# Patient Record
Sex: MALE | Race: WHITE | Employment: UNEMPLOYED | URBAN - NONMETROPOLITAN AREA
[De-identification: names, ages, dates, MRNs, and addresses within clinical notes are randomized per-mention and may not be internally consistent; named-entity substitution may affect disease eponyms.]

---

## 2018-12-13 ENCOUNTER — HOSPITAL ENCOUNTER (EMERGENCY)
Age: 48
Discharge: PSYCHIATRIC OR SUBSTANCE USE FACILITY | End: 2018-12-14
Attending: EMERGENCY MEDICINE
Payer: COMMERCIAL

## 2018-12-13 DIAGNOSIS — R45.851 SUICIDAL IDEATION: Primary | ICD-10-CM

## 2018-12-13 LAB
ABSOLUTE EOS #: 0 K/UL (ref 0–0.4)
ABSOLUTE IMMATURE GRANULOCYTE: ABNORMAL K/UL (ref 0–0.3)
ABSOLUTE LYMPH #: 0.8 K/UL (ref 1–4.8)
ABSOLUTE MONO #: 0.4 K/UL (ref 0.1–1.2)
ACETAMINOPHEN LEVEL: <5 UG/ML (ref 10–30)
ALBUMIN SERPL-MCNC: 4.4 G/DL (ref 3.5–5.2)
ALBUMIN/GLOBULIN RATIO: 1.6 (ref 1–2.5)
ALP BLD-CCNC: 58 U/L (ref 40–129)
ALT SERPL-CCNC: 10 U/L (ref 5–41)
ANION GAP SERPL CALCULATED.3IONS-SCNC: 19 MMOL/L (ref 9–17)
AST SERPL-CCNC: 16 U/L
BASOPHILS # BLD: 0 % (ref 0–2)
BASOPHILS ABSOLUTE: 0 K/UL (ref 0–0.2)
BILIRUB SERPL-MCNC: 0.27 MG/DL (ref 0.3–1.2)
BUN BLDV-MCNC: 9 MG/DL (ref 6–20)
BUN/CREAT BLD: 17 (ref 9–20)
CALCIUM SERPL-MCNC: 8.4 MG/DL (ref 8.6–10.4)
CHLORIDE BLD-SCNC: 105 MMOL/L (ref 98–107)
CO2: 20 MMOL/L (ref 20–31)
CREAT SERPL-MCNC: 0.54 MG/DL (ref 0.7–1.2)
DIFFERENTIAL TYPE: ABNORMAL
EOSINOPHILS RELATIVE PERCENT: 1 % (ref 1–8)
ETHANOL PERCENT: ABNORMAL %
ETHANOL: 247 MG/DL
GFR AFRICAN AMERICAN: >60 ML/MIN
GFR NON-AFRICAN AMERICAN: >60 ML/MIN
GFR SERPL CREATININE-BSD FRML MDRD: ABNORMAL ML/MIN/{1.73_M2}
GFR SERPL CREATININE-BSD FRML MDRD: ABNORMAL ML/MIN/{1.73_M2}
GLUCOSE BLD-MCNC: 62 MG/DL (ref 70–99)
HCT VFR BLD CALC: 41.3 % (ref 41–53)
HEMOGLOBIN: 13.6 G/DL (ref 13.5–17.5)
IMMATURE GRANULOCYTES: ABNORMAL %
LYMPHOCYTES # BLD: 16 % (ref 15–43)
MAGNESIUM: 2.1 MG/DL (ref 1.6–2.6)
MCH RBC QN AUTO: 32.8 PG (ref 26–34)
MCHC RBC AUTO-ENTMCNC: 33 G/DL (ref 31–37)
MCV RBC AUTO: 99.4 FL (ref 80–100)
MONOCYTES # BLD: 7 % (ref 6–14)
NRBC AUTOMATED: ABNORMAL PER 100 WBC
PDW BLD-RTO: 15.3 % (ref 11–14.5)
PLATELET # BLD: 228 K/UL (ref 140–450)
PLATELET ESTIMATE: ABNORMAL
PMV BLD AUTO: 7.7 FL (ref 6–12)
POTASSIUM SERPL-SCNC: 3.5 MMOL/L (ref 3.7–5.3)
RBC # BLD: 4.16 M/UL (ref 4.5–5.9)
RBC # BLD: ABNORMAL 10*6/UL
SALICYLATE LEVEL: <1 MG/DL (ref 3–10)
SEG NEUTROPHILS: 76 % (ref 44–74)
SEGMENTED NEUTROPHILS ABSOLUTE COUNT: 4.1 K/UL (ref 1.8–7.7)
SODIUM BLD-SCNC: 144 MMOL/L (ref 135–144)
THYROXINE, FREE: 1.61 NG/DL (ref 0.93–1.7)
TOTAL PROTEIN: 7.1 G/DL (ref 6.4–8.3)
TSH SERPL DL<=0.05 MIU/L-ACNC: 0.54 MIU/L (ref 0.3–5)
WBC # BLD: 5.3 K/UL (ref 3.5–11)
WBC # BLD: ABNORMAL 10*3/UL

## 2018-12-13 PROCEDURE — 6360000002 HC RX W HCPCS: Performed by: EMERGENCY MEDICINE

## 2018-12-13 PROCEDURE — 80053 COMPREHEN METABOLIC PANEL: CPT

## 2018-12-13 PROCEDURE — 80307 DRUG TEST PRSMV CHEM ANLYZR: CPT

## 2018-12-13 PROCEDURE — 2580000003 HC RX 258: Performed by: EMERGENCY MEDICINE

## 2018-12-13 PROCEDURE — G0480 DRUG TEST DEF 1-7 CLASSES: HCPCS

## 2018-12-13 PROCEDURE — 99285 EMERGENCY DEPT VISIT HI MDM: CPT

## 2018-12-13 PROCEDURE — 96374 THER/PROPH/DIAG INJ IV PUSH: CPT

## 2018-12-13 PROCEDURE — 84439 ASSAY OF FREE THYROXINE: CPT

## 2018-12-13 PROCEDURE — 85025 COMPLETE CBC W/AUTO DIFF WBC: CPT

## 2018-12-13 PROCEDURE — 83735 ASSAY OF MAGNESIUM: CPT

## 2018-12-13 PROCEDURE — 84443 ASSAY THYROID STIM HORMONE: CPT

## 2018-12-13 RX ORDER — 0.9 % SODIUM CHLORIDE 0.9 %
1000 INTRAVENOUS SOLUTION INTRAVENOUS ONCE
Status: COMPLETED | OUTPATIENT
Start: 2018-12-13 | End: 2018-12-13

## 2018-12-13 RX ORDER — LORAZEPAM 2 MG/ML
0.5 INJECTION INTRAMUSCULAR ONCE
Status: COMPLETED | OUTPATIENT
Start: 2018-12-13 | End: 2018-12-13

## 2018-12-13 RX ADMIN — SODIUM CHLORIDE 1000 ML: 9 INJECTION, SOLUTION INTRAVENOUS at 15:59

## 2018-12-13 RX ADMIN — SODIUM CHLORIDE 1000 ML: 9 INJECTION, SOLUTION INTRAVENOUS at 19:13

## 2018-12-13 RX ADMIN — LORAZEPAM 0.5 MG: 2 INJECTION INTRAMUSCULAR; INTRAVENOUS at 18:11

## 2018-12-13 NOTE — ED PROVIDER NOTES
by mouth every 8 hours as needed for Nausea    QUETIAPINE FUMARATE (SEROQUEL PO)    Take  by mouth. SUCRALFATE (CARAFATE) 1 GM TABLET    Take 1 tablet by mouth 4 times daily (with meals and nightly). ALLERGIES     is allergic to pcn [penicillins]. FAMILY HISTORY     has no family status information on file. family history is not on file. SOCIAL HISTORY      reports that he has never smoked. He has never used smokeless tobacco. He reports that he drinks about 10.0 oz of alcohol per week . He reports that he does not use drugs. PHYSICAL EXAM     INITIAL VITALS:  weight is 130 lb (59 kg). His tympanic temperature is 98.6 °F (37 °C). His blood pressure is 124/80 and his pulse is 97. His respiration is 16 and oxygen saturation is 97%. Physical Exam   Constitutional: He is oriented to person, place, and time. He appears well-developed and well-nourished. HENT:   Head: Normocephalic and atraumatic. Mouth/Throat: Oropharynx is clear and moist.   Eyes: Pupils are equal, round, and reactive to light. Conjunctivae and EOM are normal.   Neck: Normal range of motion. Neck supple. No tracheal deviation present. Cardiovascular: Regular rhythm and intact distal pulses. Tachycardia present. Pulmonary/Chest: Effort normal and breath sounds normal.   Abdominal: Soft. Bowel sounds are normal. He exhibits no distension. There is no tenderness. Musculoskeletal: Normal range of motion. He exhibits no edema or tenderness. Neurological: He is alert and oriented to person, place, and time. Skin: Skin is warm and dry. No rash noted. Psychiatric: He has a normal mood and affect. His behavior is normal. Judgment and thought content normal.   Vitals reviewed. DIFFERENTIAL DIAGNOSIS/ MDM:   Suicide precautions have been initiated. I will check baseline low blood work looking for organic issues. Alcohol level and other levels will be assessed.   Currently patient is not going through

## 2018-12-14 ENCOUNTER — HOSPITAL ENCOUNTER (INPATIENT)
Age: 48
LOS: 3 days | Discharge: HOME | DRG: 885 | End: 2018-12-17
Attending: PSYCHIATRY & NEUROLOGY | Admitting: PSYCHIATRY & NEUROLOGY
Payer: MEDICAID

## 2018-12-14 VITALS
BODY MASS INDEX: 19.2 KG/M2 | OXYGEN SATURATION: 98 % | TEMPERATURE: 99.2 F | HEART RATE: 96 BPM | RESPIRATION RATE: 16 BRPM | SYSTOLIC BLOOD PRESSURE: 134 MMHG | DIASTOLIC BLOOD PRESSURE: 88 MMHG | WEIGHT: 130 LBS

## 2018-12-14 PROBLEM — F33.9 RECURRENT DEPRESSION (HCC): Status: ACTIVE | Noted: 2018-12-14

## 2018-12-14 PROCEDURE — 6360000002 HC RX W HCPCS

## 2018-12-14 PROCEDURE — 1240000000 HC EMOTIONAL WELLNESS R&B

## 2018-12-14 PROCEDURE — 90792 PSYCH DIAG EVAL W/MED SRVCS: CPT | Performed by: PSYCHIATRY & NEUROLOGY

## 2018-12-14 PROCEDURE — 96376 TX/PRO/DX INJ SAME DRUG ADON: CPT

## 2018-12-14 PROCEDURE — 6370000000 HC RX 637 (ALT 250 FOR IP): Performed by: PSYCHIATRY & NEUROLOGY

## 2018-12-14 RX ORDER — TRAZODONE HYDROCHLORIDE 50 MG/1
50 TABLET ORAL NIGHTLY PRN
Status: DISCONTINUED | OUTPATIENT
Start: 2018-12-14 | End: 2018-12-17 | Stop reason: HOSPADM

## 2018-12-14 RX ORDER — LORAZEPAM 2 MG/ML
INJECTION INTRAMUSCULAR
Status: COMPLETED
Start: 2018-12-14 | End: 2018-12-14

## 2018-12-14 RX ORDER — CHLORDIAZEPOXIDE HYDROCHLORIDE 25 MG/1
25 CAPSULE, GELATIN COATED ORAL 4 TIMES DAILY PRN
Status: DISCONTINUED | OUTPATIENT
Start: 2018-12-14 | End: 2018-12-17 | Stop reason: HOSPADM

## 2018-12-14 RX ORDER — FAMOTIDINE 20 MG/1
40 TABLET, FILM COATED ORAL NIGHTLY
Status: DISCONTINUED | OUTPATIENT
Start: 2018-12-14 | End: 2018-12-17 | Stop reason: HOSPADM

## 2018-12-14 RX ORDER — BENZTROPINE MESYLATE 1 MG/ML
2 INJECTION INTRAMUSCULAR; INTRAVENOUS 2 TIMES DAILY PRN
Status: DISCONTINUED | OUTPATIENT
Start: 2018-12-14 | End: 2018-12-17 | Stop reason: HOSPADM

## 2018-12-14 RX ORDER — ONDANSETRON 4 MG/1
4 TABLET, ORALLY DISINTEGRATING ORAL EVERY 8 HOURS PRN
Status: DISCONTINUED | OUTPATIENT
Start: 2018-12-14 | End: 2018-12-17 | Stop reason: HOSPADM

## 2018-12-14 RX ORDER — OLANZAPINE 5 MG/1
5 TABLET ORAL
Status: ACTIVE | OUTPATIENT
Start: 2018-12-14 | End: 2018-12-14

## 2018-12-14 RX ORDER — LORAZEPAM 2 MG/ML
0.5 INJECTION INTRAMUSCULAR ONCE
Status: COMPLETED | OUTPATIENT
Start: 2018-12-14 | End: 2018-12-14

## 2018-12-14 RX ORDER — HYDROXYZINE 50 MG/1
50 TABLET, FILM COATED ORAL 3 TIMES DAILY PRN
Status: DISCONTINUED | OUTPATIENT
Start: 2018-12-14 | End: 2018-12-17 | Stop reason: HOSPADM

## 2018-12-14 RX ORDER — HALOPERIDOL 5 MG/ML
5 INJECTION INTRAMUSCULAR EVERY 4 HOURS PRN
Status: DISCONTINUED | OUTPATIENT
Start: 2018-12-14 | End: 2018-12-17 | Stop reason: HOSPADM

## 2018-12-14 RX ORDER — NICOTINE 21 MG/24HR
1 PATCH, TRANSDERMAL 24 HOURS TRANSDERMAL DAILY
Status: DISCONTINUED | OUTPATIENT
Start: 2018-12-14 | End: 2018-12-16

## 2018-12-14 RX ORDER — MAGNESIUM HYDROXIDE/ALUMINUM HYDROXICE/SIMETHICONE 120; 1200; 1200 MG/30ML; MG/30ML; MG/30ML
15 SUSPENSION ORAL EVERY 6 HOURS PRN
Status: DISCONTINUED | OUTPATIENT
Start: 2018-12-14 | End: 2018-12-17 | Stop reason: HOSPADM

## 2018-12-14 RX ORDER — VILAZODONE HYDROCHLORIDE 20 MG/1
20 TABLET ORAL DAILY
Status: DISCONTINUED | OUTPATIENT
Start: 2018-12-14 | End: 2018-12-17 | Stop reason: HOSPADM

## 2018-12-14 RX ORDER — ACETAMINOPHEN 325 MG/1
650 TABLET ORAL EVERY 4 HOURS PRN
Status: DISCONTINUED | OUTPATIENT
Start: 2018-12-14 | End: 2018-12-17 | Stop reason: HOSPADM

## 2018-12-14 RX ADMIN — LORAZEPAM 0.5 MG: 2 INJECTION INTRAMUSCULAR at 04:12

## 2018-12-14 RX ADMIN — TRAZODONE HYDROCHLORIDE 50 MG: 50 TABLET ORAL at 21:04

## 2018-12-14 RX ADMIN — ACETAMINOPHEN 650 MG: 325 TABLET, FILM COATED ORAL at 19:13

## 2018-12-14 RX ADMIN — VILAZODONE HYDROCHLORIDE 20 MG: 20 TABLET ORAL at 14:08

## 2018-12-14 RX ADMIN — FAMOTIDINE 40 MG: 20 TABLET ORAL at 21:04

## 2018-12-14 RX ADMIN — LORAZEPAM 0.5 MG: 2 INJECTION INTRAMUSCULAR; INTRAVENOUS at 04:12

## 2018-12-14 ASSESSMENT — LIFESTYLE VARIABLES
HISTORY_ALCOHOL_USE: YES
HISTORY_ALCOHOL_USE: YES

## 2018-12-14 ASSESSMENT — PAIN - FUNCTIONAL ASSESSMENT: PAIN_FUNCTIONAL_ASSESSMENT: 0-10

## 2018-12-14 ASSESSMENT — PAIN SCALES - GENERAL
PAINLEVEL_OUTOF10: 3
PAINLEVEL_OUTOF10: 7

## 2018-12-14 ASSESSMENT — SLEEP AND FATIGUE QUESTIONNAIRES: SLEEP PATTERN: UTA

## 2018-12-14 ASSESSMENT — PATIENT HEALTH QUESTIONNAIRE - PHQ9: SUM OF ALL RESPONSES TO PHQ QUESTIONS 1-9: 13

## 2018-12-14 NOTE — BH NOTE
`[facility]  Admission Note     Admission Type:   Admission Type: Voluntary    Reason for admission:  Reason for Admission: Patient and his significant other recently broke up. Patient also recently lost his job. Currently depressed, anxious, and having suicidal ideation to jump off a bridge.      PATIENT STRENGTHS:  Strengths:  (Unknown)    Patient Strengths and Limitations:  Limitations: Inappropriate/potentially harmful leisure interests, Hopeless about future    Addictive Behavior:   Addictive Behavior  In the past 3 months, have you felt or has someone told you that you have a problem with:  : None  Do you have a history of Chemical Use?: No  Do you have a history of Alcohol Use?: Yes  Do you have a history of Street Drug Abuse?: No  Histroy of Prescripton Drug Abuse?: No    Medical Problems:   Past Medical History:   Diagnosis Date    Alcohol abuse     Alcohol abuse 6/5/2014    GI bleed 6/5/2014    Hematemesis 6/5/2014    Hypertension     Intractable vomiting 6/5/2014    Suicidal behavior 12/13/2018       Status EXAM:  Status and Exam  Normal: No  Facial Expression: Avoids Gaze, Expressionless, Flat, Sad  Affect: Blunt  Level of Consciousness: Alert  Mood:Normal: No  Mood: Depressed, Anxious, Worthless, low self-esteem, Sad, Empty  Motor Activity:Normal: No  Motor Activity: Decreased  Interview Behavior: Cooperative, Evasive  Preception: [place] to Person, Judythe Mate to Time, [place] to Place, [place] to Situation  Attention:Normal: No  Attention: Unable to Concentrate  Thought Processes: Blocking  Thought Content:Normal: No  Thought Content: Poverty of Content  Hallucinations: None  Delusions: No  Memory:Normal: Yes  Insight and Judgment: No  Insight and Judgment: Poor Insight, Poor Judgment  Present Suicidal Ideation: Yes (SI to jump off of a bridge)  Present Homicidal Ideation: No    Tobacco Screening:  Practical Counseling, on admission, doyle X, if applicable and completed (first 3 are
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
## 2018-12-15 PROBLEM — F32.2 SEVERE MAJOR DEPRESSION WITHOUT PSYCHOTIC FEATURES (HCC): Status: ACTIVE | Noted: 2018-12-15

## 2018-12-15 PROCEDURE — 6370000000 HC RX 637 (ALT 250 FOR IP): Performed by: NURSE PRACTITIONER

## 2018-12-15 PROCEDURE — 6370000000 HC RX 637 (ALT 250 FOR IP): Performed by: PSYCHIATRY & NEUROLOGY

## 2018-12-15 PROCEDURE — 99232 SBSQ HOSP IP/OBS MODERATE 35: CPT | Performed by: REGISTERED NURSE

## 2018-12-15 PROCEDURE — 1240000000 HC EMOTIONAL WELLNESS R&B

## 2018-12-15 RX ADMIN — CHLORDIAZEPOXIDE HYDROCHLORIDE 25 MG: 25 CAPSULE ORAL at 10:35

## 2018-12-15 RX ADMIN — VILAZODONE HYDROCHLORIDE 20 MG: 20 TABLET ORAL at 08:39

## 2018-12-15 RX ADMIN — FAMOTIDINE 40 MG: 20 TABLET ORAL at 20:54

## 2018-12-15 RX ADMIN — HYDROXYZINE HYDROCHLORIDE 50 MG: 50 TABLET, FILM COATED ORAL at 13:29

## 2018-12-15 RX ADMIN — CHLORDIAZEPOXIDE HYDROCHLORIDE 25 MG: 25 CAPSULE ORAL at 17:37

## 2018-12-15 RX ADMIN — TRAZODONE HYDROCHLORIDE 50 MG: 50 TABLET ORAL at 20:54

## 2018-12-15 NOTE — BH NOTE
Pt did not participate in Cognitive Skills Group at 1330 due to pt was resting in room et declined to attend group when encouraged.

## 2018-12-15 NOTE — BH NOTE
Patient did not participate in PM wrap-up group,  After invitation given. Patient remain seclusive to self. Every 15 minute checks maintained.

## 2018-12-15 NOTE — PROGRESS NOTES
Oral Nightly Marycruz Koenig MD   40 mg at 12/14/18 2104    ondansetron (ZOFRAN-ODT) disintegrating tablet 4 mg  4 mg Oral Q8H PRN Marycruz Koenig MD        acetaminophen (TYLENOL) tablet 650 mg  650 mg Oral Q4H PRN Marycruz Koenig MD   650 mg at 12/14/18 1913    hydrOXYzine (ATARAX) tablet 50 mg  50 mg Oral TID PRN Marycruz Koenig MD        haloperidol lactate (HALDOL) injection 5 mg  5 mg Intramuscular Q4H PRN Marycruz Koenig MD        benztropine mesylate (COGENTIN) injection 2 mg  2 mg Intramuscular BID PRN Marycruz Koenig MD        magnesium hydroxide (MILK OF MAGNESIA) 400 MG/5ML suspension 30 mL  30 mL Oral Daily PRN Marycruz Koenig MD        aluminum & magnesium hydroxide-simethicone (MAALOX) 200-200-20 MG/5ML suspension 15 mL  15 mL Oral Q6H PRN Marycruz Koenig MD        nicotine (NICODERM CQ) 21 MG/24HR 1 patch  1 patch Transdermal Daily Marycruz Koenig MD        vilazodone HCl (VIIBRYD) TABS 20 mg  20 mg Oral Daily Marycruz Koenig MD   20 mg at 12/15/18 2162         famotidine  40 mg Oral Nightly    nicotine  1 patch Transdermal Daily    vilazodone HCl  20 mg Oral Daily       ASSESSMENT  Severe major depression without psychotic features ([facility] [address]     Patient's Response to Treatment: positive    PLAN    · Continue Viibryd 20 mg daily and Librium for alcohol withdrawal.  · Groups and therapeutic 1:1.  · Discharge when psychiatrically stable.       Electronically signed by EMANUEL Woodward on 12/15/2018 at 1:03 PM.

## 2018-12-16 PROCEDURE — 6370000000 HC RX 637 (ALT 250 FOR IP): Performed by: PSYCHIATRY & NEUROLOGY

## 2018-12-16 PROCEDURE — 6370000000 HC RX 637 (ALT 250 FOR IP): Performed by: NURSE PRACTITIONER

## 2018-12-16 PROCEDURE — 1240000000 HC EMOTIONAL WELLNESS R&B

## 2018-12-16 RX ADMIN — CHLORDIAZEPOXIDE HYDROCHLORIDE 25 MG: 25 CAPSULE ORAL at 16:31

## 2018-12-16 RX ADMIN — HYDROXYZINE HYDROCHLORIDE 50 MG: 50 TABLET, FILM COATED ORAL at 12:48

## 2018-12-16 RX ADMIN — TRAZODONE HYDROCHLORIDE 50 MG: 50 TABLET ORAL at 20:37

## 2018-12-16 RX ADMIN — FAMOTIDINE 40 MG: 20 TABLET ORAL at 20:37

## 2018-12-16 RX ADMIN — VILAZODONE HYDROCHLORIDE 20 MG: 20 TABLET ORAL at 09:58

## 2018-12-16 RX ADMIN — CHLORDIAZEPOXIDE HYDROCHLORIDE 25 MG: 25 CAPSULE ORAL at 09:58

## 2018-12-17 VITALS
HEART RATE: 73 BPM | DIASTOLIC BLOOD PRESSURE: 55 MMHG | TEMPERATURE: 97.9 F | HEIGHT: 69 IN | SYSTOLIC BLOOD PRESSURE: 97 MMHG | WEIGHT: 130 LBS | RESPIRATION RATE: 12 BRPM | BODY MASS INDEX: 19.26 KG/M2

## 2018-12-17 PROCEDURE — 6370000000 HC RX 637 (ALT 250 FOR IP): Performed by: PSYCHIATRY & NEUROLOGY

## 2018-12-17 PROCEDURE — 5130000000 HC BRIDGE APPOINTMENT

## 2018-12-17 PROCEDURE — 99239 HOSP IP/OBS DSCHRG MGMT >30: CPT | Performed by: REGISTERED NURSE

## 2018-12-17 RX ORDER — VILAZODONE HYDROCHLORIDE 20 MG/1
20 TABLET ORAL DAILY
Qty: 14 TABLET | Refills: 0 | Status: SHIPPED | OUTPATIENT
Start: 2018-12-18 | End: 2019-02-19

## 2018-12-17 RX ADMIN — VILAZODONE HYDROCHLORIDE 20 MG: 20 TABLET ORAL at 10:56

## 2018-12-17 RX ADMIN — HYDROXYZINE HYDROCHLORIDE 50 MG: 50 TABLET, FILM COATED ORAL at 10:56

## 2018-12-17 NOTE — BH NOTE
Writer notified per Charge Nurse \"Vandana\", ok to utilize [facility] and [facility] per \"Candice\", due to patient not having Insurance.

## 2018-12-17 NOTE — DISCHARGE INSTR - COC
Status:  {IP PT MENTAL STATUS:}    IV Access:  { MARGARITA IV ACCESS:871077531}    Nursing Mobility/ADLs:  Walking   {CHP DME FABW:398079643}  Transfer  {CHP DME DNDY:585410735}  Bathing  {CHP DME FHCA:074798129}  Dressing  {CHP DME MTHP:587324853}  Toileting  {CHP DME KRRE:239896147}  Feeding  {P DME ONEJ:860504559}  Med Admin  {P DME WHKA:079587744}  Med Delivery   { MARGARITA MED Delivery:917789794}    Wound Care Documentation and Therapy:        Elimination:  Continence:   · Bowel: {YES / G}  · Bladder: {YES / PI:20818}  Urinary Catheter: {Urinary Catheter:638610457}   Colostomy/Ileostomy/Ileal Conduit: {YES / JW:55374}       Date of Last BM: ***  No intake or output data in the 24 hours ending 18 1457  No intake/output data recorded.     Safety Concerns:     [employer] Safety Concerns:244393747}    Impairments/Disabilities:      [employer] Impairments/Disabilities:247842743}    Nutrition Therapy:  Current Nutrition Therapy:   [employer] Diet List:255661948}    Routes of Feeding: {[facility] DME Other Feedings:992333083}  Liquids: {Slp liquid thickness:98525}  Daily Fluid Restriction: {CHP DME Yes amt example:430204739}  Last Modified Barium Swallow with Video (Video Swallowing Test): {Done Not Done TOWJ:077191310}    Treatments at the Time of Hospital Discharge:   Respiratory Treatments: ***  Oxygen Therapy:  {Therapy; copd oxygen:62236}  Ventilator:    {[facility] Vent OQFR:021313200}    Rehab Therapies: {THERAPEUTIC INTERVENTION:8698355790}  Weight Bearing Status/Restrictions: [employer] Weight Bearin}  Other Medical Equipment (for information only, NOT a DME order):  {EQUIPMENT:688429722}  Other Treatments: ***    Patient's personal belongings (please select all that are sent with patient):  {[facility] DME Belongings:166599192}    RN SIGNATURE:  {Esignature:739694085}    CASE MANAGEMENT/SOCIAL WORK SECTION    Inpatient Status Date: ***    Readmission Risk Assessment Score:  Readmission Risk              Risk of Unplanned Readmission:        8           Discharging to Facility/ Agency   · Name:   · Address:  · Phone:  · Fax:    Dialysis Facility (if applicable)   · Name:  · Address:  · Dialysis Schedule:  · Phone:  · Fax:    / signature: {Esignature:214040695}    PHYSICIAN SECTION    Prognosis: {Prognosis:8481813224}    Condition at Discharge: [place] Patient Condition:443268549}    Rehab Potential (if transferring to Rehab): {Prognosis:2444627053}    Recommended Labs or Other Treatments After Discharge: ***    Physician Certification: I certify the above information and transfer of Radha Sandoval  is necessary for the continuing treatment of the diagnosis listed and that he requires {Admit to Appropriate Level of Care:12732} for {GREATER/LESS:672978113} 30 days.      Update Admission H&P: {CHP DME Changes in XOUFZ:543433319}    PHYSICIAN SIGNATURE:  {Esignature:850771486}

## 2018-12-17 NOTE — CARE COORDINATION
Pt declined to attend psychoeducation at 1100 am despite encouragement. Pt offered 1:1 and accepted/refused.
family. Pt states that he has no Insurance. Pt states that he has completed his GED. Pt states that he is currently having SI. Pt denies AH, VH, and HI.  Pt states that he has not attempted suicide in the past.

## 2018-12-17 NOTE — DISCHARGE SUMMARY
DISCHARGE SUMMARY  Patient ID:  Helen Kirk  [id number]  2 y.o.  1970    Admit date: 12/14/2018    Discharge date and time: 12/17/2018  2:18 PM     Admitting Physician: Vikash Rogel MD     Discharge Physician:  Melissa Joyner APRN - CNS    Admission Diagnoses: Recurrent depression [facility]) [F33.9]    Discharge Diagnoses:   Severe major depression without psychotic features [facility])     Patient Active Problem List   Diagnosis Code    GI bleed K92.2    Alcohol abuse F10.10    Gastritis K29.70    Esophagitis K20.9    Hypokalemia E87.6    Intractable nausea and vomiting R11.2    Anxiety F41.9    Hypertension I10    Hematemesis K92.0    Recurrent depression ([facility] [address] F33.9    Severe major depression without psychotic features ([facility] [address] F32.2        Admission Condition: poor    Discharged Condition: stable    Indication for Admission: threat to self    History of Present Illnes (present tense wording indicates findings from admission exam on 12/14/2018 and are not necessarily indicative of current findings): Hospital Course:   Upon admission, Helen Kirk was provided a safe secure environment, introduced to unit milieu. Patient did not participate in groups or individual therapies. Meds were adjusted. After few days of hospital care, patient began to feel improvement. On morning rounds 12/17/2018, patient endorsed feeling ready for discharge. He reports he never felt suicidal and that he reported that because he was homeless and needed a place to stay. He states he would like to be discharged to a shelter. Patient denies hallucinations or delusions. Denies SE's from meds. It was decided that pt had achieved maximum benefit from hospital care and can be discharged     Consults:   None    Significant Diagnostic Studies: Routine labs and diagnostics    Treatments: Psychotropic medications, therapy with group, milieu, and 1:1 with nurses, social workers, PA-C/CNP, and Attending physician.       Discharge

## 2018-12-17 NOTE — PLAN OF CARE
Problem: Altered Mood, Depressive Behavior:  Goal: Able to verbalize and/or display a decrease in depressive symptoms  Able to verbalize and/or display a decrease in depressive symptoms   Outcome: Ongoing  Patient observed with flat affect. Irritable upon approach. Patient is med and somatic focused. Reassurance given during 1:1 interaction. Patient has been isolative and withdrawn, but does come out for needs. Encouraged to set a goal to attend programming, but patient declines at this time and continues to isolate. Patient denies suicidal and homicidal ideation. Denies audio and visual hallucinations. Observed depressed and unmotivated. Educated on positive coping skills and relaxation techniques. Discussed healthy lifestyle choices and the importance of compliance upon discharge. Educated patient on ways to maintain sobriety. Encouraged to surround self around positive sober persons and attend 90 meetings in 90 days upon discharge. Patient has remained free from harm. Every 15 minute and spontaneous safety checks have been maintained.   Goal: Ability to disclose and discuss suicidal ideas will improve  Ability to disclose and discuss suicidal ideas will improve   Outcome: Ongoing    Goal: Able to verbalize support systems  Able to verbalize support systems   Outcome: Ongoing    Goal: Absence of self-harm  Absence of self-harm   Outcome: Ongoing
GOALS UPDATE:   Time frame for Long-Term Goals: 6 months  Members Present in Team Meeting: See Signature Sheet    ROSA Alaniz  Goal: Ability to disclose and discuss suicidal ideas will improve  Ability to disclose and discuss suicidal ideas will improve   Outcome: Ongoing    Goal: Able to verbalize support systems  Able to verbalize support systems   Outcome: Ongoing    Goal: Absence of self-harm  Absence of self-harm   Outcome: Ongoing

## 2018-12-28 NOTE — ED PROVIDER NOTES
ATTENDING  ADDENDUM     Care of this patient was assumed from preceding physician. The patient was seen for Suicidal  .  The patient's initial evaluation and plan have been discussed with the prior provider who initially evaluated the patient. Nursing Notes, Past Medical Hx, Past Surgical Hx, Social Hx, Allergies, and Family Hx were all reviewed. This patient was initally seen by Alaina ag and was awaitng transport for suicidal ideation and admission. I did not order anything new on this patient. DIFFERENTIAL DIAGNOSIS/ MDM:           Follow Exit Care instructions closely. I have reviewed the disposition diagnosis with the patient and or their family/guardian. I have answered their questions and given discharge instructions. They voiced understanding of these instructions and did not have any further questions or complaints.     DIAGNOSTIC RESULTS     RADIOLOGY:   Non-plain film images such as CT, Ultrasound and MRI are read by the radiologist. Plain radiographic images are visualized and preliminarily interpreted by the emergency physician with the below findings:  No orders to display       LABS:  Results for orders placed or performed during the hospital encounter of 12/13/18   CBC Auto Differential   Result Value Ref Range    WBC 5.3 3.5 - 11.0 k/uL    RBC 4.16 (L) 4.5 - 5.9 m/uL    Hemoglobin 13.6 13.5 - 17.5 g/dL    Hematocrit 41.3 41 - 53 %    MCV 99.4 80 - 100 fL    MCH 32.8 26 - 34 pg    MCHC 33.0 31 - 37 g/dL    RDW 15.3 (H) 11.0 - 14.5 %    Platelets 197 283 - 503 k/uL    MPV 7.7 6.0 - 12.0 fL    NRBC Automated NOT REPORTED per 100 WBC    Differential Type NOT REPORTED     Immature Granulocytes NOT REPORTED 0 %    Absolute Immature Granulocyte NOT REPORTED 0.00 - 0.30 k/uL    WBC Morphology NOT REPORTED     RBC Morphology NOT REPORTED     Platelet Estimate NOT REPORTED     Seg Neutrophils 76 (H) 44 - 74 %    Lymphocytes 16 15 - 43 %    Monocytes 7 6 - 14 %    Eosinophils % 1 1 - 8 %    Basophils CREATININE 0.54 (*)     Calcium 8.4 (*)     Potassium 3.5 (*)     Anion Gap 19 (*)     Total Bilirubin 0.27 (*)     All other components within normal limits   SALICYLATE LEVEL - Abnormal; Notable for the following:     Salicylate Lvl <1 (*)     All other components within normal limits   ACETAMINOPHEN LEVEL - Abnormal; Notable for the following:     Acetaminophen Level <5 (*)     All other components within normal limits   TSH WITHOUT REFLEX   T4, FREE   MAGNESIUM   URINALYSIS   URINE DRUG SCREEN        EKG:      EMERGENCY DEPARTMENT COURSE:   Vitals:    Vitals:    12/14/18 0317 12/14/18 0330 12/14/18 0345 12/14/18 0400   BP:    134/88   Pulse:    96   Resp: 16 16 16 16   Temp:    99.2 °F (37.3 °C)   TempSrc:    Tympanic   SpO2:    98%   Weight:         -------------------------  BP: 134/88, Temp: 99.2 °F (37.3 °C), Pulse: 96, Resp: 16          FINAL IMPRESSION      1. Suicidal ideation          DISPOSITION/PLAN   DISPOSITION Decision To Transfer 12/14/2018 04:58:55 AM      Condition on Disposition    stable    PATIENT REFERRED TO:  No follow-up provider specified.     DISCHARGE MEDICATIONS:  Discharge Medication List as of 12/14/2018  4:59 AM          (Please note that portions of this note were completed with a voice recognition program.  Efforts were made to edit the dictations but occasionally words are mis-transcribed.)    Martinez MD  Attending Emergency Physician        Casimiro Mclean MD  12/27/18 6225 [place] III, MD  12/27/18 0528

## 2019-02-18 ENCOUNTER — HOSPITAL ENCOUNTER (INPATIENT)
Age: 49
LOS: 2 days | Discharge: HOME | End: 2019-02-21
Attending: EMERGENCY MEDICINE | Admitting: INTERNAL MEDICINE
Payer: MEDICAID

## 2019-02-18 DIAGNOSIS — F10.10 ALCOHOL ABUSE: Chronic | ICD-10-CM

## 2019-02-18 DIAGNOSIS — F10.930 ALCOHOL WITHDRAWAL SYNDROME WITHOUT COMPLICATION (HCC): Primary | ICD-10-CM

## 2019-02-18 DIAGNOSIS — Z59.00 HOMELESS: ICD-10-CM

## 2019-02-18 PROCEDURE — 99285 EMERGENCY DEPT VISIT HI MDM: CPT

## 2019-02-18 SDOH — ECONOMIC STABILITY - HOUSING INSECURITY: HOMELESSNESS UNSPECIFIED: Z59.00

## 2019-02-19 ENCOUNTER — APPOINTMENT (OUTPATIENT)
Dept: GENERAL RADIOLOGY | Age: 49
End: 2019-02-19
Payer: MEDICAID

## 2019-02-19 PROBLEM — F10.930 ALCOHOL WITHDRAWAL, UNCOMPLICATED (HCC): Status: ACTIVE | Noted: 2019-02-19

## 2019-02-19 PROBLEM — Z87.19 HISTORY OF ESOPHAGITIS: Status: ACTIVE | Noted: 2019-02-19

## 2019-02-19 LAB
ABSOLUTE EOS #: 0.16 K/UL (ref 0–0.44)
ABSOLUTE IMMATURE GRANULOCYTE: <0.03 K/UL (ref 0–0.3)
ABSOLUTE LYMPH #: 1.09 K/UL (ref 1.1–3.7)
ABSOLUTE MONO #: 0.72 K/UL (ref 0.1–1.2)
ACETAMINOPHEN LEVEL: <5 UG/ML (ref 10–30)
ALBUMIN SERPL-MCNC: 3.8 G/DL (ref 3.5–5.2)
ALBUMIN/GLOBULIN RATIO: 1.4 (ref 1–2.5)
ALP BLD-CCNC: 65 U/L (ref 40–129)
ALT SERPL-CCNC: 11 U/L (ref 5–41)
AMPHETAMINE SCREEN URINE: NEGATIVE
ANION GAP SERPL CALCULATED.3IONS-SCNC: 17 MMOL/L (ref 9–17)
AST SERPL-CCNC: 14 U/L
BARBITURATE SCREEN URINE: NEGATIVE
BASOPHILS # BLD: 1 % (ref 0–2)
BASOPHILS ABSOLUTE: 0.04 K/UL (ref 0–0.2)
BENZODIAZEPINE SCREEN, URINE: NEGATIVE
BILIRUB SERPL-MCNC: 0.52 MG/DL (ref 0.3–1.2)
BILIRUBIN DIRECT: 0.13 MG/DL
BILIRUBIN, INDIRECT: 0.39 MG/DL (ref 0–1)
BUN BLDV-MCNC: 16 MG/DL (ref 6–20)
BUN/CREAT BLD: ABNORMAL (ref 9–20)
BUPRENORPHINE URINE: NORMAL
CALCIUM SERPL-MCNC: 8.4 MG/DL (ref 8.6–10.4)
CANNABINOID SCREEN URINE: NEGATIVE
CHLORIDE BLD-SCNC: 105 MMOL/L (ref 98–107)
CO2: 22 MMOL/L (ref 20–31)
COCAINE METABOLITE, URINE: NEGATIVE
CREAT SERPL-MCNC: 0.55 MG/DL (ref 0.7–1.2)
DIFFERENTIAL TYPE: ABNORMAL
EOSINOPHILS RELATIVE PERCENT: 2 % (ref 1–4)
ETHANOL PERCENT: 0.14 %
ETHANOL PERCENT: <0.01 %
ETHANOL: 141 MG/DL
ETHANOL: <10 MG/DL
FOLATE: 7.6 NG/ML
GFR AFRICAN AMERICAN: >60 ML/MIN
GFR NON-AFRICAN AMERICAN: >60 ML/MIN
GFR SERPL CREATININE-BSD FRML MDRD: ABNORMAL ML/MIN/{1.73_M2}
GFR SERPL CREATININE-BSD FRML MDRD: ABNORMAL ML/MIN/{1.73_M2}
GLOBULIN: NORMAL G/DL (ref 1.5–3.8)
GLUCOSE BLD-MCNC: 95 MG/DL (ref 70–99)
HCT VFR BLD CALC: 35.7 % (ref 40.7–50.3)
HEMOGLOBIN: 12.3 G/DL (ref 13–17)
HIV AG/AB: NONREACTIVE
IMMATURE GRANULOCYTES: 0 %
LACTIC ACID, WHOLE BLOOD: 0.8 MMOL/L (ref 0.7–2.1)
LIPASE: 20 U/L (ref 13–60)
LYMPHOCYTES # BLD: 13 % (ref 24–43)
MCH RBC QN AUTO: 33.7 PG (ref 25.2–33.5)
MCHC RBC AUTO-ENTMCNC: 34.5 G/DL (ref 28.4–34.8)
MCV RBC AUTO: 97.8 FL (ref 82.6–102.9)
MDMA URINE: NORMAL
METHADONE SCREEN, URINE: NEGATIVE
METHAMPHETAMINE, URINE: NORMAL
MONOCYTES # BLD: 9 % (ref 3–12)
NRBC AUTOMATED: 0 PER 100 WBC
OPIATES, URINE: NEGATIVE
OXYCODONE SCREEN URINE: NEGATIVE
PDW BLD-RTO: 12.6 % (ref 11.8–14.4)
PHENCYCLIDINE, URINE: NEGATIVE
PLATELET # BLD: 210 K/UL (ref 138–453)
PLATELET ESTIMATE: ABNORMAL
PMV BLD AUTO: 10.3 FL (ref 8.1–13.5)
POTASSIUM SERPL-SCNC: 3.4 MMOL/L (ref 3.7–5.3)
PROPOXYPHENE, URINE: NORMAL
RBC # BLD: 3.65 M/UL (ref 4.21–5.77)
RBC # BLD: ABNORMAL 10*6/UL
SALICYLATE LEVEL: <1 MG/DL (ref 3–10)
SEG NEUTROPHILS: 75 % (ref 36–65)
SEGMENTED NEUTROPHILS ABSOLUTE COUNT: 6.46 K/UL (ref 1.5–8.1)
SODIUM BLD-SCNC: 144 MMOL/L (ref 135–144)
TEST INFORMATION: NORMAL
TOTAL PROTEIN: 6.5 G/DL (ref 6.4–8.3)
TOXIC TRICYCLIC SC,BLOOD: NEGATIVE
TRICYCLIC ANTIDEPRESSANTS, UR: NORMAL
VITAMIN B-12: 261 PG/ML (ref 232–1245)
WBC # BLD: 8.5 K/UL (ref 3.5–11.3)
WBC # BLD: ABNORMAL 10*3/UL

## 2019-02-19 PROCEDURE — 81003 URINALYSIS AUTO W/O SCOPE: CPT

## 2019-02-19 PROCEDURE — G0378 HOSPITAL OBSERVATION PER HR: HCPCS

## 2019-02-19 PROCEDURE — 96375 TX/PRO/DX INJ NEW DRUG ADDON: CPT

## 2019-02-19 PROCEDURE — 96372 THER/PROPH/DIAG INJ SC/IM: CPT

## 2019-02-19 PROCEDURE — 85025 COMPLETE CBC W/AUTO DIFF WBC: CPT

## 2019-02-19 PROCEDURE — 87040 BLOOD CULTURE FOR BACTERIA: CPT

## 2019-02-19 PROCEDURE — 6370000000 HC RX 637 (ALT 250 FOR IP): Performed by: EMERGENCY MEDICINE

## 2019-02-19 PROCEDURE — 96365 THER/PROPH/DIAG IV INF INIT: CPT

## 2019-02-19 PROCEDURE — 2580000003 HC RX 258: Performed by: EMERGENCY MEDICINE

## 2019-02-19 PROCEDURE — 6360000002 HC RX W HCPCS: Performed by: EMERGENCY MEDICINE

## 2019-02-19 PROCEDURE — 2580000003 HC RX 258: Performed by: STUDENT IN AN ORGANIZED HEALTH CARE EDUCATION/TRAINING PROGRAM

## 2019-02-19 PROCEDURE — 6370000000 HC RX 637 (ALT 250 FOR IP): Performed by: STUDENT IN AN ORGANIZED HEALTH CARE EDUCATION/TRAINING PROGRAM

## 2019-02-19 PROCEDURE — 90792 PSYCH DIAG EVAL W/MED SRVCS: CPT | Performed by: NURSE PRACTITIONER

## 2019-02-19 PROCEDURE — 82746 ASSAY OF FOLIC ACID SERUM: CPT

## 2019-02-19 PROCEDURE — 99222 1ST HOSP IP/OBS MODERATE 55: CPT | Performed by: INTERNAL MEDICINE

## 2019-02-19 PROCEDURE — 71046 X-RAY EXAM CHEST 2 VIEWS: CPT

## 2019-02-19 PROCEDURE — 6360000002 HC RX W HCPCS: Performed by: STUDENT IN AN ORGANIZED HEALTH CARE EDUCATION/TRAINING PROGRAM

## 2019-02-19 PROCEDURE — 2060000000 HC ICU INTERMEDIATE R&B

## 2019-02-19 PROCEDURE — 87389 HIV-1 AG W/HIV-1&-2 AB AG IA: CPT

## 2019-02-19 PROCEDURE — 96367 TX/PROPH/DG ADDL SEQ IV INF: CPT

## 2019-02-19 PROCEDURE — 2500000003 HC RX 250 WO HCPCS: Performed by: EMERGENCY MEDICINE

## 2019-02-19 PROCEDURE — 83690 ASSAY OF LIPASE: CPT

## 2019-02-19 PROCEDURE — 80076 HEPATIC FUNCTION PANEL: CPT

## 2019-02-19 PROCEDURE — 80307 DRUG TEST PRSMV CHEM ANLYZR: CPT

## 2019-02-19 PROCEDURE — 82607 VITAMIN B-12: CPT

## 2019-02-19 PROCEDURE — G0480 DRUG TEST DEF 1-7 CLASSES: HCPCS

## 2019-02-19 PROCEDURE — 83605 ASSAY OF LACTIC ACID: CPT

## 2019-02-19 PROCEDURE — 36415 COLL VENOUS BLD VENIPUNCTURE: CPT

## 2019-02-19 PROCEDURE — 80048 BASIC METABOLIC PNL TOTAL CA: CPT

## 2019-02-19 RX ORDER — SODIUM CHLORIDE 0.9 % (FLUSH) 0.9 %
10 SYRINGE (ML) INJECTION PRN
Status: DISCONTINUED | OUTPATIENT
Start: 2019-02-19 | End: 2019-02-19

## 2019-02-19 RX ORDER — FAMOTIDINE 20 MG/1
20 TABLET, FILM COATED ORAL 2 TIMES DAILY
Status: CANCELLED | OUTPATIENT
Start: 2019-02-19

## 2019-02-19 RX ORDER — SODIUM CHLORIDE 0.9 % (FLUSH) 0.9 %
10 SYRINGE (ML) INJECTION EVERY 12 HOURS SCHEDULED
Status: DISCONTINUED | OUTPATIENT
Start: 2019-02-19 | End: 2019-02-21 | Stop reason: HOSPADM

## 2019-02-19 RX ORDER — ONDANSETRON 2 MG/ML
4 INJECTION INTRAMUSCULAR; INTRAVENOUS EVERY 6 HOURS PRN
Status: DISCONTINUED | OUTPATIENT
Start: 2019-02-19 | End: 2019-02-21 | Stop reason: HOSPADM

## 2019-02-19 RX ORDER — LORAZEPAM 2 MG/ML
1 INJECTION INTRAMUSCULAR
Status: DISCONTINUED | OUTPATIENT
Start: 2019-02-19 | End: 2019-02-21 | Stop reason: HOSPADM

## 2019-02-19 RX ORDER — ACETAMINOPHEN 325 MG/1
650 TABLET ORAL EVERY 4 HOURS PRN
Status: DISCONTINUED | OUTPATIENT
Start: 2019-02-19 | End: 2019-02-19

## 2019-02-19 RX ORDER — LORAZEPAM 2 MG/1
2 TABLET ORAL
Status: DISCONTINUED | OUTPATIENT
Start: 2019-02-19 | End: 2019-02-21 | Stop reason: HOSPADM

## 2019-02-19 RX ORDER — POTASSIUM CHLORIDE 20 MEQ/1
40 TABLET, EXTENDED RELEASE ORAL ONCE
Status: DISCONTINUED | OUTPATIENT
Start: 2019-02-19 | End: 2019-02-21 | Stop reason: HOSPADM

## 2019-02-19 RX ORDER — CHLORDIAZEPOXIDE HYDROCHLORIDE 10 MG/1
10 CAPSULE, GELATIN COATED ORAL ONCE
Status: COMPLETED | OUTPATIENT
Start: 2019-02-19 | End: 2019-02-19

## 2019-02-19 RX ORDER — SODIUM CHLORIDE 0.9 % (FLUSH) 0.9 %
10 SYRINGE (ML) INJECTION EVERY 12 HOURS SCHEDULED
Status: DISCONTINUED | OUTPATIENT
Start: 2019-02-19 | End: 2019-02-19

## 2019-02-19 RX ORDER — LORAZEPAM 2 MG/1
4 TABLET ORAL
Status: DISCONTINUED | OUTPATIENT
Start: 2019-02-19 | End: 2019-02-21 | Stop reason: HOSPADM

## 2019-02-19 RX ORDER — FOLIC ACID 1 MG/1
1 TABLET ORAL DAILY
Status: DISCONTINUED | OUTPATIENT
Start: 2019-02-19 | End: 2019-02-21 | Stop reason: HOSPADM

## 2019-02-19 RX ORDER — LORAZEPAM 2 MG/ML
2 INJECTION INTRAMUSCULAR
Status: DISCONTINUED | OUTPATIENT
Start: 2019-02-19 | End: 2019-02-21 | Stop reason: HOSPADM

## 2019-02-19 RX ORDER — MULTIVITAMIN WITH FOLIC ACID 400 MCG
1 TABLET ORAL DAILY
Status: DISCONTINUED | OUTPATIENT
Start: 2019-02-19 | End: 2019-02-21 | Stop reason: HOSPADM

## 2019-02-19 RX ORDER — SODIUM CHLORIDE 9 MG/ML
INJECTION, SOLUTION INTRAVENOUS CONTINUOUS
Status: DISCONTINUED | OUTPATIENT
Start: 2019-02-19 | End: 2019-02-21 | Stop reason: HOSPADM

## 2019-02-19 RX ORDER — LORAZEPAM 2 MG/ML
4 INJECTION INTRAMUSCULAR
Status: DISCONTINUED | OUTPATIENT
Start: 2019-02-19 | End: 2019-02-21 | Stop reason: HOSPADM

## 2019-02-19 RX ORDER — LORAZEPAM 2 MG/ML
3 INJECTION INTRAMUSCULAR
Status: DISCONTINUED | OUTPATIENT
Start: 2019-02-19 | End: 2019-02-21 | Stop reason: HOSPADM

## 2019-02-19 RX ORDER — THIAMINE MONONITRATE (VIT B1) 100 MG
100 TABLET ORAL DAILY
Status: DISCONTINUED | OUTPATIENT
Start: 2019-02-19 | End: 2019-02-21 | Stop reason: HOSPADM

## 2019-02-19 RX ORDER — SODIUM CHLORIDE 0.9 % (FLUSH) 0.9 %
10 SYRINGE (ML) INJECTION PRN
Status: DISCONTINUED | OUTPATIENT
Start: 2019-02-19 | End: 2019-02-21 | Stop reason: HOSPADM

## 2019-02-19 RX ORDER — LORAZEPAM 0.5 MG/1
1 TABLET ORAL
Status: DISCONTINUED | OUTPATIENT
Start: 2019-02-19 | End: 2019-02-21 | Stop reason: HOSPADM

## 2019-02-19 RX ORDER — CHLORDIAZEPOXIDE HYDROCHLORIDE 10 MG/1
10 CAPSULE, GELATIN COATED ORAL 3 TIMES DAILY
Status: DISCONTINUED | OUTPATIENT
Start: 2019-02-19 | End: 2019-02-21 | Stop reason: HOSPADM

## 2019-02-19 RX ORDER — FOLIC ACID 5 MG/ML
5 INJECTION, SOLUTION INTRAMUSCULAR; INTRAVENOUS; SUBCUTANEOUS DAILY
Status: DISCONTINUED | OUTPATIENT
Start: 2019-02-19 | End: 2019-02-19

## 2019-02-19 RX ADMIN — SODIUM CHLORIDE: 9 INJECTION, SOLUTION INTRAVENOUS at 22:57

## 2019-02-19 RX ADMIN — LORAZEPAM 1 MG: 0.5 TABLET ORAL at 22:55

## 2019-02-19 RX ADMIN — LORAZEPAM 1 MG: 0.5 TABLET ORAL at 19:54

## 2019-02-19 RX ADMIN — FOLIC ACID 1 MG: 1 TABLET ORAL at 08:07

## 2019-02-19 RX ADMIN — SODIUM CHLORIDE: 9 INJECTION, SOLUTION INTRAVENOUS at 15:23

## 2019-02-19 RX ADMIN — Medication 10 ML: at 08:08

## 2019-02-19 RX ADMIN — CHLORDIAZEPOXIDE HYDROCHLORIDE 10 MG: 10 CAPSULE ORAL at 15:05

## 2019-02-19 RX ADMIN — FOLIC ACID 5 MG: 5 INJECTION, SOLUTION INTRAMUSCULAR; INTRAVENOUS; SUBCUTANEOUS at 03:42

## 2019-02-19 RX ADMIN — THERA TABS 1 TABLET: TAB at 08:08

## 2019-02-19 RX ADMIN — CHLORDIAZEPOXIDE HYDROCHLORIDE 10 MG: 10 CAPSULE ORAL at 08:10

## 2019-02-19 RX ADMIN — LORAZEPAM 1 MG: 0.5 TABLET ORAL at 15:05

## 2019-02-19 RX ADMIN — ENOXAPARIN SODIUM 40 MG: 40 INJECTION SUBCUTANEOUS at 08:08

## 2019-02-19 RX ADMIN — CHLORDIAZEPOXIDE HYDROCHLORIDE 10 MG: 10 CAPSULE ORAL at 21:38

## 2019-02-19 RX ADMIN — Medication 100 MG: at 08:08

## 2019-02-19 RX ADMIN — CHLORDIAZEPOXIDE HYDROCHLORIDE 10 MG: 10 CAPSULE ORAL at 02:56

## 2019-02-19 RX ADMIN — ONDANSETRON 4 MG: 2 INJECTION INTRAMUSCULAR; INTRAVENOUS at 05:56

## 2019-02-19 RX ADMIN — THIAMINE HYDROCHLORIDE 100 MG: 100 INJECTION, SOLUTION INTRAMUSCULAR; INTRAVENOUS at 02:56

## 2019-02-19 RX ADMIN — ACETAMINOPHEN 650 MG: 325 TABLET ORAL at 07:08

## 2019-02-19 RX ADMIN — LORAZEPAM 1 MG: 0.5 TABLET ORAL at 06:22

## 2019-02-19 ASSESSMENT — ENCOUNTER SYMPTOMS
SHORTNESS OF BREATH: 0
VOMITING: 0
DIARRHEA: 0
NAUSEA: 0
CONSTIPATION: 0
COUGH: 0
WHEEZING: 0
EYES NEGATIVE: 1

## 2019-02-19 ASSESSMENT — PAIN SCALES - GENERAL
PAINLEVEL_OUTOF10: 0
PAINLEVEL_OUTOF10: 0

## 2019-02-20 LAB
ABSOLUTE EOS #: 0.14 K/UL (ref 0–0.4)
ABSOLUTE IMMATURE GRANULOCYTE: 0 K/UL (ref 0–0.3)
ABSOLUTE LYMPH #: 0.67 K/UL (ref 1–4.8)
ABSOLUTE MONO #: 0.48 K/UL (ref 0.1–0.8)
ANION GAP SERPL CALCULATED.3IONS-SCNC: 11 MMOL/L (ref 9–17)
BASOPHILS # BLD: 0 % (ref 0–2)
BASOPHILS ABSOLUTE: 0 K/UL (ref 0–0.2)
BUN BLDV-MCNC: 13 MG/DL (ref 6–20)
BUN/CREAT BLD: ABNORMAL (ref 9–20)
CALCIUM SERPL-MCNC: 8.3 MG/DL (ref 8.6–10.4)
CHLORIDE BLD-SCNC: 105 MMOL/L (ref 98–107)
CO2: 23 MMOL/L (ref 20–31)
CREAT SERPL-MCNC: 0.6 MG/DL (ref 0.7–1.2)
DIFFERENTIAL TYPE: ABNORMAL
EKG ATRIAL RATE: 88 BPM
EKG P AXIS: 77 DEGREES
EKG P-R INTERVAL: 138 MS
EKG Q-T INTERVAL: 344 MS
EKG QRS DURATION: 102 MS
EKG QTC CALCULATION (BAZETT): 416 MS
EKG R AXIS: 86 DEGREES
EKG T AXIS: 80 DEGREES
EKG VENTRICULAR RATE: 88 BPM
EOSINOPHILS RELATIVE PERCENT: 3 % (ref 1–4)
GFR AFRICAN AMERICAN: >60 ML/MIN
GFR NON-AFRICAN AMERICAN: >60 ML/MIN
GFR SERPL CREATININE-BSD FRML MDRD: ABNORMAL ML/MIN/{1.73_M2}
GFR SERPL CREATININE-BSD FRML MDRD: ABNORMAL ML/MIN/{1.73_M2}
GLUCOSE BLD-MCNC: 92 MG/DL (ref 70–99)
HCT VFR BLD CALC: 33.2 % (ref 40.7–50.3)
HEMOGLOBIN: 11.1 G/DL (ref 13–17)
IMMATURE GRANULOCYTES: 0 %
LYMPHOCYTES # BLD: 14 % (ref 24–44)
MCH RBC QN AUTO: 32.6 PG (ref 25.2–33.5)
MCHC RBC AUTO-ENTMCNC: 33.4 G/DL (ref 28.4–34.8)
MCV RBC AUTO: 97.6 FL (ref 82.6–102.9)
MONOCYTES # BLD: 10 % (ref 1–7)
MORPHOLOGY: NORMAL
NRBC AUTOMATED: 0 PER 100 WBC
PDW BLD-RTO: 12.8 % (ref 11.8–14.4)
PLATELET # BLD: 172 K/UL (ref 138–453)
PLATELET ESTIMATE: ABNORMAL
PMV BLD AUTO: 10.9 FL (ref 8.1–13.5)
POTASSIUM SERPL-SCNC: 4 MMOL/L (ref 3.7–5.3)
RBC # BLD: 3.4 M/UL (ref 4.21–5.77)
RBC # BLD: ABNORMAL 10*6/UL
SEG NEUTROPHILS: 73 % (ref 36–66)
SEGMENTED NEUTROPHILS ABSOLUTE COUNT: 3.51 K/UL (ref 1.8–7.7)
SODIUM BLD-SCNC: 139 MMOL/L (ref 135–144)
WBC # BLD: 4.8 K/UL (ref 3.5–11.3)
WBC # BLD: ABNORMAL 10*3/UL

## 2019-02-20 PROCEDURE — 96372 THER/PROPH/DIAG INJ SC/IM: CPT

## 2019-02-20 PROCEDURE — G0378 HOSPITAL OBSERVATION PER HR: HCPCS

## 2019-02-20 PROCEDURE — 97165 OT EVAL LOW COMPLEX 30 MIN: CPT

## 2019-02-20 PROCEDURE — 97535 SELF CARE MNGMENT TRAINING: CPT

## 2019-02-20 PROCEDURE — 6370000000 HC RX 637 (ALT 250 FOR IP): Performed by: STUDENT IN AN ORGANIZED HEALTH CARE EDUCATION/TRAINING PROGRAM

## 2019-02-20 PROCEDURE — 93005 ELECTROCARDIOGRAM TRACING: CPT

## 2019-02-20 PROCEDURE — 85025 COMPLETE CBC W/AUTO DIFF WBC: CPT

## 2019-02-20 PROCEDURE — 6360000002 HC RX W HCPCS: Performed by: STUDENT IN AN ORGANIZED HEALTH CARE EDUCATION/TRAINING PROGRAM

## 2019-02-20 PROCEDURE — 2580000003 HC RX 258: Performed by: STUDENT IN AN ORGANIZED HEALTH CARE EDUCATION/TRAINING PROGRAM

## 2019-02-20 PROCEDURE — 99232 SBSQ HOSP IP/OBS MODERATE 35: CPT | Performed by: INTERNAL MEDICINE

## 2019-02-20 PROCEDURE — 6370000000 HC RX 637 (ALT 250 FOR IP): Performed by: EMERGENCY MEDICINE

## 2019-02-20 PROCEDURE — 80048 BASIC METABOLIC PNL TOTAL CA: CPT

## 2019-02-20 PROCEDURE — 2060000000 HC ICU INTERMEDIATE R&B

## 2019-02-20 PROCEDURE — 36415 COLL VENOUS BLD VENIPUNCTURE: CPT

## 2019-02-20 RX ORDER — GUAIFENESIN DEXTROMETHORPHAN HYDROBROMIDE ORAL SOLUTION 10; 100 MG/5ML; MG/5ML
10 SOLUTION ORAL EVERY 4 HOURS PRN
Status: DISCONTINUED | OUTPATIENT
Start: 2019-02-20 | End: 2019-02-21 | Stop reason: HOSPADM

## 2019-02-20 RX ADMIN — LORAZEPAM 1 MG: 0.5 TABLET ORAL at 14:23

## 2019-02-20 RX ADMIN — CHLORDIAZEPOXIDE HYDROCHLORIDE 10 MG: 10 CAPSULE ORAL at 08:39

## 2019-02-20 RX ADMIN — LORAZEPAM 1 MG: 0.5 TABLET ORAL at 10:54

## 2019-02-20 RX ADMIN — Medication 10 ML: at 22:11

## 2019-02-20 RX ADMIN — THERA TABS 1 TABLET: TAB at 08:35

## 2019-02-20 RX ADMIN — Medication 10 ML: at 23:08

## 2019-02-20 RX ADMIN — ENOXAPARIN SODIUM 40 MG: 40 INJECTION SUBCUTANEOUS at 08:35

## 2019-02-20 RX ADMIN — FOLIC ACID 1 MG: 1 TABLET ORAL at 08:35

## 2019-02-20 RX ADMIN — Medication 100 MG: at 08:35

## 2019-02-20 RX ADMIN — CHLORDIAZEPOXIDE HYDROCHLORIDE 10 MG: 10 CAPSULE ORAL at 22:11

## 2019-02-20 RX ADMIN — CHLORDIAZEPOXIDE HYDROCHLORIDE 10 MG: 10 CAPSULE ORAL at 14:23

## 2019-02-20 ASSESSMENT — PAIN DESCRIPTION - LOCATION: LOCATION: OTHER (COMMENT)

## 2019-02-20 ASSESSMENT — PAIN SCALES - GENERAL: PAINLEVEL_OUTOF10: 5

## 2019-02-20 ASSESSMENT — PAIN DESCRIPTION - DESCRIPTORS: DESCRIPTORS: ACHING

## 2019-02-21 VITALS
SYSTOLIC BLOOD PRESSURE: 93 MMHG | TEMPERATURE: 98 F | OXYGEN SATURATION: 96 % | BODY MASS INDEX: 19.2 KG/M2 | DIASTOLIC BLOOD PRESSURE: 52 MMHG | WEIGHT: 130 LBS | HEART RATE: 86 BPM | RESPIRATION RATE: 24 BRPM

## 2019-02-21 LAB
ABSOLUTE EOS #: 0.17 K/UL (ref 0–0.44)
ABSOLUTE IMMATURE GRANULOCYTE: <0.03 K/UL (ref 0–0.3)
ABSOLUTE LYMPH #: 1.18 K/UL (ref 1.1–3.7)
ABSOLUTE MONO #: 0.58 K/UL (ref 0.1–1.2)
ANION GAP SERPL CALCULATED.3IONS-SCNC: 13 MMOL/L (ref 9–17)
BASOPHILS # BLD: 0 % (ref 0–2)
BASOPHILS ABSOLUTE: <0.03 K/UL (ref 0–0.2)
BILIRUBIN URINE: NEGATIVE
BUN BLDV-MCNC: 9 MG/DL (ref 6–20)
BUN/CREAT BLD: ABNORMAL (ref 9–20)
CALCIUM SERPL-MCNC: 8.4 MG/DL (ref 8.6–10.4)
CHLORIDE BLD-SCNC: 103 MMOL/L (ref 98–107)
CO2: 22 MMOL/L (ref 20–31)
COLOR: YELLOW
COMMENT UA: NORMAL
CREAT SERPL-MCNC: 0.55 MG/DL (ref 0.7–1.2)
DIFFERENTIAL TYPE: ABNORMAL
EOSINOPHILS RELATIVE PERCENT: 3 % (ref 1–4)
GFR AFRICAN AMERICAN: >60 ML/MIN
GFR NON-AFRICAN AMERICAN: >60 ML/MIN
GFR SERPL CREATININE-BSD FRML MDRD: ABNORMAL ML/MIN/{1.73_M2}
GFR SERPL CREATININE-BSD FRML MDRD: ABNORMAL ML/MIN/{1.73_M2}
GLUCOSE BLD-MCNC: 93 MG/DL (ref 70–99)
GLUCOSE URINE: NEGATIVE
HCT VFR BLD CALC: 36.2 % (ref 40.7–50.3)
HEMOGLOBIN: 12.1 G/DL (ref 13–17)
IMMATURE GRANULOCYTES: 0 %
KETONES, URINE: NEGATIVE
LEUKOCYTE ESTERASE, URINE: NEGATIVE
LYMPHOCYTES # BLD: 21 % (ref 24–43)
MCH RBC QN AUTO: 32.9 PG (ref 25.2–33.5)
MCHC RBC AUTO-ENTMCNC: 33.4 G/DL (ref 28.4–34.8)
MCV RBC AUTO: 98.4 FL (ref 82.6–102.9)
MONOCYTES # BLD: 10 % (ref 3–12)
NITRITE, URINE: NEGATIVE
NRBC AUTOMATED: 0 PER 100 WBC
PDW BLD-RTO: 12.7 % (ref 11.8–14.4)
PH UA: 5.5 (ref 5–8)
PLATELET # BLD: 197 K/UL (ref 138–453)
PLATELET ESTIMATE: ABNORMAL
PMV BLD AUTO: 10 FL (ref 8.1–13.5)
POTASSIUM SERPL-SCNC: 4 MMOL/L (ref 3.7–5.3)
PROTEIN UA: NEGATIVE
RBC # BLD: 3.68 M/UL (ref 4.21–5.77)
RBC # BLD: ABNORMAL 10*6/UL
SEG NEUTROPHILS: 66 % (ref 36–65)
SEGMENTED NEUTROPHILS ABSOLUTE COUNT: 3.79 K/UL (ref 1.5–8.1)
SODIUM BLD-SCNC: 138 MMOL/L (ref 135–144)
SPECIFIC GRAVITY UA: 1.01 (ref 1–1.03)
TURBIDITY: CLEAR
URINE HGB: NEGATIVE
UROBILINOGEN, URINE: NORMAL
WBC # BLD: 5.8 K/UL (ref 3.5–11.3)
WBC # BLD: ABNORMAL 10*3/UL

## 2019-02-21 PROCEDURE — 99232 SBSQ HOSP IP/OBS MODERATE 35: CPT | Performed by: INTERNAL MEDICINE

## 2019-02-21 PROCEDURE — 6370000000 HC RX 637 (ALT 250 FOR IP): Performed by: EMERGENCY MEDICINE

## 2019-02-21 PROCEDURE — 6370000000 HC RX 637 (ALT 250 FOR IP): Performed by: STUDENT IN AN ORGANIZED HEALTH CARE EDUCATION/TRAINING PROGRAM

## 2019-02-21 PROCEDURE — 36415 COLL VENOUS BLD VENIPUNCTURE: CPT

## 2019-02-21 PROCEDURE — 80048 BASIC METABOLIC PNL TOTAL CA: CPT

## 2019-02-21 PROCEDURE — 99232 SBSQ HOSP IP/OBS MODERATE 35: CPT | Performed by: NURSE PRACTITIONER

## 2019-02-21 PROCEDURE — 85025 COMPLETE CBC W/AUTO DIFF WBC: CPT

## 2019-02-21 PROCEDURE — G0378 HOSPITAL OBSERVATION PER HR: HCPCS

## 2019-02-21 RX ORDER — FOLIC ACID 1 MG/1
1 TABLET ORAL DAILY
Qty: 30 TABLET | Refills: 3 | Status: SHIPPED | OUTPATIENT
Start: 2019-02-22 | End: 2019-04-10

## 2019-02-21 RX ORDER — CHLORDIAZEPOXIDE HYDROCHLORIDE 10 MG/1
10 CAPSULE, GELATIN COATED ORAL 3 TIMES DAILY PRN
Qty: 21 CAPSULE | Refills: 0 | Status: SHIPPED | OUTPATIENT
Start: 2019-02-21 | End: 2019-02-28

## 2019-02-21 RX ORDER — LANOLIN ALCOHOL/MO/W.PET/CERES
100 CREAM (GRAM) TOPICAL DAILY
Qty: 30 TABLET | Refills: 3 | Status: SHIPPED | OUTPATIENT
Start: 2019-02-22 | End: 2019-04-10

## 2019-02-21 RX ORDER — MULTIVITAMIN WITH FOLIC ACID 400 MCG
1 TABLET ORAL DAILY
Qty: 30 TABLET | Refills: 3 | Status: SHIPPED | OUTPATIENT
Start: 2019-02-22 | End: 2019-04-10

## 2019-02-21 RX ORDER — CHLORDIAZEPOXIDE HYDROCHLORIDE 10 MG/1
10 CAPSULE, GELATIN COATED ORAL 3 TIMES DAILY
Qty: 21 CAPSULE | Refills: 0 | Status: SHIPPED | OUTPATIENT
Start: 2019-02-21 | End: 2019-02-28

## 2019-02-21 RX ADMIN — CHLORDIAZEPOXIDE HYDROCHLORIDE 10 MG: 10 CAPSULE ORAL at 10:08

## 2019-02-21 RX ADMIN — LORAZEPAM 2 MG: 2 TABLET ORAL at 14:57

## 2019-02-21 RX ADMIN — CHLORDIAZEPOXIDE HYDROCHLORIDE 10 MG: 10 CAPSULE ORAL at 13:46

## 2019-02-21 RX ADMIN — LORAZEPAM 2 MG: 2 TABLET ORAL at 08:13

## 2019-02-22 ENCOUNTER — HOSPITAL ENCOUNTER (EMERGENCY)
Age: 49
Discharge: HOME | End: 2019-02-23
Attending: EMERGENCY MEDICINE
Payer: COMMERCIAL

## 2019-02-22 DIAGNOSIS — R45.851 SUICIDAL IDEATION: Primary | ICD-10-CM

## 2019-02-22 LAB
AMPHETAMINE SCREEN URINE: NEGATIVE
BARBITURATE SCREEN URINE: NEGATIVE
BENZODIAZEPINE SCREEN, URINE: POSITIVE
BUPRENORPHINE URINE: ABNORMAL
CANNABINOID SCREEN URINE: NEGATIVE
COCAINE METABOLITE, URINE: NEGATIVE
MDMA URINE: ABNORMAL
METHADONE SCREEN, URINE: NEGATIVE
METHAMPHETAMINE, URINE: ABNORMAL
OPIATES, URINE: NEGATIVE
OXYCODONE SCREEN URINE: NEGATIVE
PHENCYCLIDINE, URINE: NEGATIVE
PROPOXYPHENE, URINE: ABNORMAL
TEST INFORMATION: ABNORMAL
TRICYCLIC ANTIDEPRESSANTS, UR: ABNORMAL

## 2019-02-22 PROCEDURE — 80307 DRUG TEST PRSMV CHEM ANLYZR: CPT

## 2019-02-22 PROCEDURE — 99285 EMERGENCY DEPT VISIT HI MDM: CPT

## 2019-02-22 PROCEDURE — 6370000000 HC RX 637 (ALT 250 FOR IP): Performed by: STUDENT IN AN ORGANIZED HEALTH CARE EDUCATION/TRAINING PROGRAM

## 2019-02-22 RX ORDER — LORAZEPAM 0.5 MG/1
1 TABLET ORAL ONCE
Status: COMPLETED | OUTPATIENT
Start: 2019-02-22 | End: 2019-02-22

## 2019-02-22 RX ADMIN — LORAZEPAM 1 MG: 0.5 TABLET ORAL at 22:47

## 2019-02-22 ASSESSMENT — ENCOUNTER SYMPTOMS
ABDOMINAL PAIN: 0
SHORTNESS OF BREATH: 0
DIARRHEA: 0
VOMITING: 0
COUGH: 1
RHINORRHEA: 1
SINUS PRESSURE: 1

## 2019-02-23 VITALS
RESPIRATION RATE: 16 BRPM | HEART RATE: 74 BPM | WEIGHT: 135 LBS | BODY MASS INDEX: 19.99 KG/M2 | OXYGEN SATURATION: 97 % | SYSTOLIC BLOOD PRESSURE: 104 MMHG | TEMPERATURE: 97.2 F | HEIGHT: 69 IN | DIASTOLIC BLOOD PRESSURE: 65 MMHG

## 2019-02-23 ASSESSMENT — ENCOUNTER SYMPTOMS: BACK PAIN: 0

## 2019-02-25 LAB
CULTURE: NORMAL
CULTURE: NORMAL
Lab: NORMAL
Lab: NORMAL
SPECIMEN DESCRIPTION: NORMAL
SPECIMEN DESCRIPTION: NORMAL

## 2019-04-10 ENCOUNTER — HOSPITAL ENCOUNTER (INPATIENT)
Age: 49
LOS: 6 days | Discharge: HOME | DRG: 751 | End: 2019-04-16
Attending: EMERGENCY MEDICINE | Admitting: PSYCHIATRY & NEUROLOGY
Payer: COMMERCIAL

## 2019-04-10 DIAGNOSIS — F32.A DEPRESSION, UNSPECIFIED DEPRESSION TYPE: Primary | ICD-10-CM

## 2019-04-10 PROBLEM — F32.2 SEVERE MAJOR DEPRESSION (HCC): Status: ACTIVE | Noted: 2019-04-10

## 2019-04-10 PROBLEM — F33.9 MAJOR DEPRESSIVE DISORDER, RECURRENT (HCC): Status: ACTIVE | Noted: 2019-04-10

## 2019-04-10 PROCEDURE — 1240000000 HC EMOTIONAL WELLNESS R&B

## 2019-04-10 PROCEDURE — 99285 EMERGENCY DEPT VISIT HI MDM: CPT

## 2019-04-10 PROCEDURE — 6370000000 HC RX 637 (ALT 250 FOR IP): Performed by: EMERGENCY MEDICINE

## 2019-04-10 PROCEDURE — 6370000000 HC RX 637 (ALT 250 FOR IP): Performed by: NURSE PRACTITIONER

## 2019-04-10 RX ORDER — ACETAMINOPHEN 325 MG/1
650 TABLET ORAL EVERY 4 HOURS PRN
Status: DISCONTINUED | OUTPATIENT
Start: 2019-04-10 | End: 2019-04-16 | Stop reason: HOSPADM

## 2019-04-10 RX ORDER — LORAZEPAM 1 MG/1
1 TABLET ORAL ONCE
Status: COMPLETED | OUTPATIENT
Start: 2019-04-10 | End: 2019-04-10

## 2019-04-10 RX ORDER — MAGNESIUM HYDROXIDE/ALUMINUM HYDROXICE/SIMETHICONE 120; 1200; 1200 MG/30ML; MG/30ML; MG/30ML
30 SUSPENSION ORAL EVERY 6 HOURS PRN
Status: DISCONTINUED | OUTPATIENT
Start: 2019-04-10 | End: 2019-04-16 | Stop reason: HOSPADM

## 2019-04-10 RX ORDER — QUETIAPINE FUMARATE 300 MG/1
300 TABLET, FILM COATED ORAL NIGHTLY
Status: ON HOLD | COMMUNITY
End: 2019-04-15 | Stop reason: SDUPTHER

## 2019-04-10 RX ORDER — ESCITALOPRAM OXALATE 20 MG/1
20 TABLET ORAL DAILY
Status: ON HOLD | COMMUNITY
End: 2019-04-15 | Stop reason: SDUPTHER

## 2019-04-10 RX ORDER — TRAZODONE HYDROCHLORIDE 100 MG/1
100 TABLET ORAL NIGHTLY
Status: ON HOLD | COMMUNITY
End: 2019-04-15 | Stop reason: HOSPADM

## 2019-04-10 RX ORDER — NICOTINE 21 MG/24HR
1 PATCH, TRANSDERMAL 24 HOURS TRANSDERMAL DAILY
Status: DISCONTINUED | OUTPATIENT
Start: 2019-04-10 | End: 2019-04-10

## 2019-04-10 RX ORDER — TRAZODONE HYDROCHLORIDE 50 MG/1
50 TABLET ORAL NIGHTLY PRN
Status: DISCONTINUED | OUTPATIENT
Start: 2019-04-10 | End: 2019-04-16 | Stop reason: HOSPADM

## 2019-04-10 RX ORDER — BENZTROPINE MESYLATE 1 MG/ML
2 INJECTION INTRAMUSCULAR; INTRAVENOUS 2 TIMES DAILY PRN
Status: DISCONTINUED | OUTPATIENT
Start: 2019-04-10 | End: 2019-04-16 | Stop reason: HOSPADM

## 2019-04-10 RX ORDER — HYDROXYZINE HYDROCHLORIDE 25 MG/1
25 TABLET, FILM COATED ORAL 3 TIMES DAILY PRN
Status: DISCONTINUED | OUTPATIENT
Start: 2019-04-10 | End: 2019-04-16 | Stop reason: HOSPADM

## 2019-04-10 RX ADMIN — HYDROXYZINE HYDROCHLORIDE 25 MG: 25 TABLET, FILM COATED ORAL at 23:25

## 2019-04-10 RX ADMIN — TRAZODONE HYDROCHLORIDE 50 MG: 50 TABLET ORAL at 23:25

## 2019-04-10 RX ADMIN — LORAZEPAM 1 MG: 1 TABLET ORAL at 18:24

## 2019-04-10 RX ADMIN — ACETAMINOPHEN 650 MG: 325 TABLET, FILM COATED ORAL at 23:25

## 2019-04-10 ASSESSMENT — ENCOUNTER SYMPTOMS
EYES NEGATIVE: 1
RESPIRATORY NEGATIVE: 1
BACK PAIN: 0
COUGH: 0
SHORTNESS OF BREATH: 0
GASTROINTESTINAL NEGATIVE: 1
ABDOMINAL PAIN: 0

## 2019-04-10 ASSESSMENT — SLEEP AND FATIGUE QUESTIONNAIRES
DIFFICULTY FALLING ASLEEP: YES
AVERAGE NUMBER OF SLEEP HOURS: 0
DO YOU HAVE DIFFICULTY SLEEPING: YES
RESTFUL SLEEP: NO
DIFFICULTY STAYING ASLEEP: YES
DIFFICULTY ARISING: NO
SLEEP PATTERN: INSOMNIA;RESTLESSNESS

## 2019-04-10 ASSESSMENT — PAIN SCALES - GENERAL
PAINLEVEL_OUTOF10: 3
PAINLEVEL_OUTOF10: 0
PAINLEVEL_OUTOF10: 0

## 2019-04-10 ASSESSMENT — PAIN DESCRIPTION - LOCATION: LOCATION: HEAD

## 2019-04-10 ASSESSMENT — PATIENT HEALTH QUESTIONNAIRE - PHQ9: SUM OF ALL RESPONSES TO PHQ QUESTIONS 1-9: 20

## 2019-04-10 ASSESSMENT — LIFESTYLE VARIABLES: HISTORY_ALCOHOL_USE: NO

## 2019-04-10 ASSESSMENT — PAIN DESCRIPTION - PAIN TYPE: TYPE: ACUTE PAIN

## 2019-04-10 NOTE — PROGRESS NOTES
Medication History completed:    New medications: quetiapine, escitalopram, trazodone    Medications discontinued: folic acid, multivitamin, thiamine    Changes to dosing: none    Stated allergies: As listed    Other pertinent information: Medications confirmed with [facility]. The patient stated he hasn't been taking the escitalopram because he did not feel it was making a difference.      Thank you,  Serena Jo, PharmD  [phone]

## 2019-04-10 NOTE — ED NOTES
Provisional Diagnosis:    Major Depressive Disorder    Psychosocial and Contextual Factors:   Patient has substance abuse issues. Patient has relationship issues. C-SSRS Summary:      Patient: X  Family:   Agency:       Present Suicidal Behavior:  X    Verbal:  Patient reports a plan to drink antifreeze    Attempt: Patient denies. Past Suicidal Behavior:  Patient denies. Verbal:     Attempt:       Self-Injurious/Self-Mutilation: Patient denies. Trauma Identified:   Patient denies. Substance Abuse: Alcohol      Protective Factors:    Patient has housing. Patient has insurance. Risk Factors:    Patient has substance abuse issues. Patient is not currently linked with mental health agency. Clinical Summary:    Patient is a 47year old  male that [facility]ations. Patient reports he has not slept for several days and feels worthless. Patient states \"I'm old and I have messed everything up. I cannot come back from this. I want to die. \" Patient reports a plan to drink antifreeze. Patient denies any previous attempts to harm himself. Patient denies H/I at this time. Patient denies auditory or visual hallucinations. Patient reports he is from the [facility] and is here for \"rehab. \" Patient reports he has been staying at the [facility] for a couple weeks now. Patient states he does not have a history of mental health issues and did it become an issue until he got sober. Patient reports he is not currently linked with a mental health agency but has taken Seroquel in the past. Patient denies any current legal issue. Patient is voluntary. Level of Care Disposition:    Writer consulted with Yari Roger CNP. Patient to be admitted to the [facility] under Dr. Jose Ramon Ardon for safety and stabilization.

## 2019-04-10 NOTE — ED PROVIDER NOTES
EMERGENCY DEPARTMENT ENCOUNTER    Pt Name: Franco Diaz  MRN: [id number]  [place] 1970  Date of evaluation: 4/10/19  CHIEF COMPLAINT       Chief Complaint   Patient presents with    Mental Health Problem     HISTORY OF PRESENT ILLNESS   The pt presents for evaluation of mental health. He is depressed, anxious. He has a history of similar symptoms. He also has a history of alcohol abuse, last drink 2 weeks ago. Pt recently moved from [facility], in the process of getting in contact with mental health resources here. He states he only takes seroquel. He has a stated plan to drink antifreeze. The history is provided by the patient. Mental Health Problem   Presenting symptoms: depression, suicidal thoughts and suicidal threats    Degree of incapacity (severity):  Severe  Onset quality:  Gradual  Duration:  3 days  Timing:  Constant  Progression:  Worsening  Chronicity:  Chronic  Treatment compliance:  Untreated  Relieved by:  Nothing  Worsened by:  Nothing  Ineffective treatments:  None tried  Associated symptoms: no abdominal pain, no chest pain and no headaches        REVIEW OF SYSTEMS     Review of Systems   Constitutional: Negative. Negative for fever. HENT: Negative. Negative for congestion. Eyes: Negative. Respiratory: Negative. Negative for cough and shortness of breath. Cardiovascular: Negative. Negative for chest pain. Gastrointestinal: Negative. Negative for abdominal pain. Genitourinary: Negative. Musculoskeletal: Negative. Negative for back pain. Skin: Negative. Negative for rash. Neurological: Negative. Negative for headaches. Psychiatric/Behavioral: Positive for suicidal ideas. All other systems reviewed and are negative.     PASTMEDICAL HISTORY     Past Medical History:   Diagnosis Date    Alcohol abuse     Alcohol abuse 6/5/2014    GI bleed 6/5/2014    Hematemesis 6/5/2014    Hypertension     Intractable vomiting 6/5/2014    Suicidal behavior evaluation. Pt is ready for admission at this time. CRITICAL CARE:       PROCEDURES:    Procedures    DIAGNOSTIC RESULTS   EKG:All EKG's are interpreted by the Emergency Department Physician who either signs or Co-signs this chart in the absence of a cardiologist.        RADIOLOGY:All plain film, CT, MRI, and formal ultrasound images (except ED bedside ultrasound) are read by the radiologist, see reports below, unless otherwisenoted in MDM or here. No orders to display     LABS: All lab results were reviewed by myself, and all abnormals are listed below. Labs Reviewed - No data to display    EMERGENCY DEPARTMENTCOURSE:         Vitals:    Vitals:    04/10/19 1729   BP: 130/79   Pulse: 74   Resp: 16   Temp: 98.8 °F (37.1 °C)   TempSrc: Oral   SpO2: 98%   Weight: 135 lb (61.2 kg)   Height: 5' 9\" (1.753 m)       The patient was given the following medications while in the emergency department:  Orders Placed This Encounter   Medications    LORazepam (ATIVAN) tablet 1 mg     CONSULTS:  IP CONSULT TO HOSPITALIST    FINAL IMPRESSION      1. Depression, unspecified depression type          DISPOSITION/PLAN   DISPOSITION Admitted 04/10/2019 06:34:42 PM      PATIENT REFERRED TO:  .  None          DISCHARGE MEDICATIONS:  New Prescriptions    No medications on file     Alphonso Swartz MD  Attending Emergency Physician                    Rita Garza MD  04/10/19 8433

## 2019-04-11 PROBLEM — F32.A DEPRESSION: Status: ACTIVE | Noted: 2018-12-14

## 2019-04-11 LAB
ABSOLUTE EOS #: 0.1 K/UL (ref 0–0.4)
ABSOLUTE IMMATURE GRANULOCYTE: ABNORMAL K/UL (ref 0–0.3)
ABSOLUTE LYMPH #: 0.9 K/UL (ref 1–4.8)
ABSOLUTE MONO #: 0.7 K/UL (ref 0.1–1.3)
ALBUMIN SERPL-MCNC: 4 G/DL (ref 3.5–5.2)
ALBUMIN/GLOBULIN RATIO: ABNORMAL (ref 1–2.5)
ALP BLD-CCNC: 51 U/L (ref 40–129)
ALT SERPL-CCNC: 7 U/L (ref 5–41)
ANION GAP SERPL CALCULATED.3IONS-SCNC: 15 MMOL/L (ref 9–17)
AST SERPL-CCNC: 11 U/L
BASOPHILS # BLD: 1 % (ref 0–2)
BASOPHILS ABSOLUTE: 0 K/UL (ref 0–0.2)
BILIRUB SERPL-MCNC: 0.34 MG/DL (ref 0.3–1.2)
BUN BLDV-MCNC: 17 MG/DL (ref 6–20)
BUN/CREAT BLD: ABNORMAL (ref 9–20)
CALCIUM SERPL-MCNC: 8.9 MG/DL (ref 8.6–10.4)
CHLORIDE BLD-SCNC: 104 MMOL/L (ref 98–107)
CHOLESTEROL/HDL RATIO: 3.8
CHOLESTEROL: 195 MG/DL
CO2: 21 MMOL/L (ref 20–31)
CREAT SERPL-MCNC: 0.6 MG/DL (ref 0.7–1.2)
DIFFERENTIAL TYPE: ABNORMAL
EOSINOPHILS RELATIVE PERCENT: 2 % (ref 0–4)
ESTIMATED AVERAGE GLUCOSE: 88 MG/DL
GFR AFRICAN AMERICAN: >60 ML/MIN
GFR NON-AFRICAN AMERICAN: >60 ML/MIN
GFR SERPL CREATININE-BSD FRML MDRD: ABNORMAL ML/MIN/{1.73_M2}
GFR SERPL CREATININE-BSD FRML MDRD: ABNORMAL ML/MIN/{1.73_M2}
GLUCOSE BLD-MCNC: 81 MG/DL (ref 70–99)
HBA1C MFR BLD: 4.7 % (ref 4–6)
HCT VFR BLD CALC: 39.2 % (ref 41–53)
HDLC SERPL-MCNC: 52 MG/DL
HEMOGLOBIN: 13.3 G/DL (ref 13.5–17.5)
IMMATURE GRANULOCYTES: ABNORMAL %
LDL CHOLESTEROL: 131 MG/DL (ref 0–130)
LYMPHOCYTES # BLD: 16 % (ref 24–44)
MCH RBC QN AUTO: 32.8 PG (ref 26–34)
MCHC RBC AUTO-ENTMCNC: 33.9 G/DL (ref 31–37)
MCV RBC AUTO: 96.8 FL (ref 80–100)
MONOCYTES # BLD: 13 % (ref 1–7)
NRBC AUTOMATED: ABNORMAL PER 100 WBC
PDW BLD-RTO: 13.6 % (ref 11.5–14.9)
PLATELET # BLD: 276 K/UL (ref 150–450)
PLATELET ESTIMATE: ABNORMAL
PMV BLD AUTO: 8.2 FL (ref 6–12)
POTASSIUM SERPL-SCNC: 3.9 MMOL/L (ref 3.7–5.3)
RBC # BLD: 4.06 M/UL (ref 4.5–5.9)
RBC # BLD: ABNORMAL 10*6/UL
SEG NEUTROPHILS: 68 % (ref 36–66)
SEGMENTED NEUTROPHILS ABSOLUTE COUNT: 3.6 K/UL (ref 1.3–9.1)
SODIUM BLD-SCNC: 140 MMOL/L (ref 135–144)
THYROXINE, FREE: 1.25 NG/DL (ref 0.93–1.7)
TOTAL PROTEIN: 6.9 G/DL (ref 6.4–8.3)
TRIGL SERPL-MCNC: 62 MG/DL
TSH SERPL DL<=0.05 MIU/L-ACNC: 1.89 MIU/L (ref 0.3–5)
VLDLC SERPL CALC-MCNC: ABNORMAL MG/DL (ref 1–30)
WBC # BLD: 5.4 K/UL (ref 3.5–11)
WBC # BLD: ABNORMAL 10*3/UL

## 2019-04-11 PROCEDURE — 84439 ASSAY OF FREE THYROXINE: CPT

## 2019-04-11 PROCEDURE — 80053 COMPREHEN METABOLIC PANEL: CPT

## 2019-04-11 PROCEDURE — 6370000000 HC RX 637 (ALT 250 FOR IP): Performed by: NURSE PRACTITIONER

## 2019-04-11 PROCEDURE — 80061 LIPID PANEL: CPT

## 2019-04-11 PROCEDURE — 85025 COMPLETE CBC W/AUTO DIFF WBC: CPT

## 2019-04-11 PROCEDURE — 1240000000 HC EMOTIONAL WELLNESS R&B

## 2019-04-11 PROCEDURE — 84443 ASSAY THYROID STIM HORMONE: CPT

## 2019-04-11 PROCEDURE — 90792 PSYCH DIAG EVAL W/MED SRVCS: CPT | Performed by: NURSE PRACTITIONER

## 2019-04-11 PROCEDURE — 83036 HEMOGLOBIN GLYCOSYLATED A1C: CPT

## 2019-04-11 PROCEDURE — 36415 COLL VENOUS BLD VENIPUNCTURE: CPT

## 2019-04-11 RX ORDER — ESCITALOPRAM OXALATE 20 MG/1
20 TABLET ORAL DAILY
Status: DISCONTINUED | OUTPATIENT
Start: 2019-04-11 | End: 2019-04-16 | Stop reason: HOSPADM

## 2019-04-11 RX ORDER — QUETIAPINE FUMARATE 300 MG/1
300 TABLET, FILM COATED ORAL NIGHTLY
Status: DISCONTINUED | OUTPATIENT
Start: 2019-04-11 | End: 2019-04-16 | Stop reason: HOSPADM

## 2019-04-11 RX ADMIN — QUETIAPINE FUMARATE 300 MG: 300 TABLET ORAL at 20:46

## 2019-04-11 RX ADMIN — HYDROXYZINE HYDROCHLORIDE 25 MG: 25 TABLET, FILM COATED ORAL at 15:01

## 2019-04-11 RX ADMIN — HYDROXYZINE HYDROCHLORIDE 25 MG: 25 TABLET, FILM COATED ORAL at 20:48

## 2019-04-11 RX ADMIN — ACETAMINOPHEN 650 MG: 325 TABLET, FILM COATED ORAL at 17:57

## 2019-04-11 RX ADMIN — HYDROXYZINE HYDROCHLORIDE 25 MG: 25 TABLET, FILM COATED ORAL at 07:57

## 2019-04-11 RX ADMIN — ACETAMINOPHEN 650 MG: 325 TABLET, FILM COATED ORAL at 07:57

## 2019-04-11 RX ADMIN — ESCITALOPRAM OXALATE 20 MG: 20 TABLET ORAL at 15:01

## 2019-04-11 RX ADMIN — TRAZODONE HYDROCHLORIDE 50 MG: 50 TABLET ORAL at 20:46

## 2019-04-11 ASSESSMENT — SLEEP AND FATIGUE QUESTIONNAIRES
DO YOU USE A SLEEP AID: YES
AVERAGE NUMBER OF SLEEP HOURS: 0
DO YOU HAVE DIFFICULTY SLEEPING: YES
DIFFICULTY ARISING: NO
DIFFICULTY STAYING ASLEEP: YES
RESTFUL SLEEP: NO
DIFFICULTY FALLING ASLEEP: YES

## 2019-04-11 ASSESSMENT — PAIN SCALES - GENERAL
PAINLEVEL_OUTOF10: 10
PAINLEVEL_OUTOF10: 0
PAINLEVEL_OUTOF10: 7
PAINLEVEL_OUTOF10: 0

## 2019-04-11 ASSESSMENT — LIFESTYLE VARIABLES: HISTORY_ALCOHOL_USE: YES

## 2019-04-11 ASSESSMENT — PAIN DESCRIPTION - LOCATION: LOCATION: HEAD

## 2019-04-11 ASSESSMENT — PAIN DESCRIPTION - PAIN TYPE: TYPE: ACUTE PAIN

## 2019-04-11 ASSESSMENT — PATIENT HEALTH QUESTIONNAIRE - PHQ9: SUM OF ALL RESPONSES TO PHQ QUESTIONS 1-9: 22

## 2019-04-11 NOTE — PLAN OF CARE
[facility]  Initial Interdisciplinary Treatment Plan NO      Original treatment plan Date & Time: 4/11/19    Admission Type:  Admission Type: Voluntary    Reason for admission:   Reason for Admission: Voluntary from [facility] AN [facility]FFILIATE [facility] with SI to drink anti-freeze. Denies alcohol and drug use.     Estimated Length of Stay:  5-7days  Estimated Discharge Date: to be determined by physician    PATIENT STRENGTHS:  Patient Strengths:Strengths: Connection to output provider, Communication  Patient Strengths and Limitations:Limitations: Hopeless about future, General negative or hopeless attitude about future/recovery, Difficulty problem solving/relies on others to help solve problems  Addictive Behavior: Addictive Behavior  In the past 3 months, have you felt or has someone told you that you have a problem with:  : None  Do you have a history of Chemical Use?: No  Do you have a history of Alcohol Use?: No  Do you have a history of Street Drug Abuse?: No  Histroy of Prescripton Drug Abuse?: No  Medical Problems:  Past Medical History:   Diagnosis Date    Alcohol abuse     Alcohol abuse 6/5/2014    GI bleed 6/5/2014    Hematemesis 6/5/2014    Hypertension     Intractable vomiting 6/5/2014    Suicidal behavior 12/13/2018     Status EXAM:Status and Exam  Normal: No  Facial Expression: Avoids Gaze, Flat, Sad  Affect: Blunt  Level of Consciousness: Alert  Mood:Normal: No  Mood: Depressed, Helpless, Sad  Motor Activity:Normal: Yes  Interview Behavior: Cooperative  Preception: [place] to Person, [place] to Time, [place] to Place, [place] to Situation  Attention:Normal: Yes  Thought Processes: Circumstantial  Thought Content:Normal: No  Thought Content: Poverty of Content  Hallucinations: None  Delusions: No  Memory:Normal: Yes  Insight and Judgment: No  Insight and Judgment: Poor Judgment, Poor Insight  Present Suicidal Ideation: Yes(fleeting SI no plan, contracts for safety)  Present Homicidal Ideation: No    EDUCATION:   Learner Progress Toward Treatment Goals: reviewed group plans and strategies for care    Method:group therapy, medication compliance, individualized assessments and care planning    Outcome: needs reinforcement    PATIENT GOALS: to be discussed with patient within 72 hours    PLAN/TREATMENT RECOMMENDATIONS:     continue group therapy , medications compliance, goal setting, individualized assessments and care, continue to monitor pt on unit      SHORT-TERM GOALS:   Time frame for Short-Term Goals: 5-7 days    LONG-TERM GOALS:  Time frame for Long-Term Goals: 6 months  Members Present in Team Meeting: See Signature Sheet    Yariel Chavez, [id number]

## 2019-04-11 NOTE — GROUP NOTE
Group Therapy Note    Date: April 11    Group Start Time: 1430  Group End Time: 1510  Group Topic: Psychoeducation    [facility]    Christie Shaikh    Pt did not participate in social interaction/ values clarification group at 1430 despite staff encouragement to attend.           Signature:  Christie Shaikh

## 2019-04-11 NOTE — BH NOTE
`[facility]  Admission Note     Admission Type:   Admission Type: Voluntary    Reason for admission:  Reason for Admission: Voluntary from [facility] AN [facility]FFILIATE OF [facility] with SI to drink anti-freeze. Denies alcohol and drug use.     PATIENT STRENGTHS:  Strengths: Connection to output provider, Communication    Patient Strengths and Limitations:  Limitations: Hopeless about future, General negative or hopeless attitude about future/recovery, Difficulty problem solving/relies on others to help solve problems    Addictive Behavior:   Addictive Behavior  In the past 3 months, have you felt or has someone told you that you have a problem with:  : None  Do you have a history of Chemical Use?: No  Do you have a history of Alcohol Use?: No  Do you have a history of Street Drug Abuse?: No  Histroy of Prescripton Drug Abuse?: No    Medical Problems:   Past Medical History:   Diagnosis Date    Alcohol abuse     Alcohol abuse 6/5/2014    GI bleed 6/5/2014    Hematemesis 6/5/2014    Hypertension     Intractable vomiting 6/5/2014    Suicidal behavior 12/13/2018       Status EXAM:  Status and Exam  Normal: No  Facial Expression: Avoids Gaze, Flat, Sad  Affect: Blunt  Level of Consciousness: Alert  Mood:Normal: No  Mood: Depressed, Helpless, Sad  Motor Activity:Normal: Yes  Interview Behavior: Cooperative  Preception: [place] to Person, [place] to Time, [place] to Place, [place] to Situation  Attention:Normal: Yes  Thought Processes: Circumstantial  Thought Content:Normal: No  Thought Content: Poverty of Content  Hallucinations: None  Delusions: No  Memory:Normal: Yes  Insight and Judgment: No  Insight and Judgment: Poor Judgment, Poor Insight  Present Suicidal Ideation: Yes(fleeting SI no plan, contracts for safety)  Present Homicidal Ideation: No    Tobacco Screening:  Practical Counseling, on admission, doyle X, if applicable and completed (first 3 are required if patient doesn't refuse):            ( )  Recognizing danger situations

## 2019-04-11 NOTE — GROUP NOTE
Group Therapy Note    Date: April 11    Group Start Time: 0900  Group End Time: 0920  Group Topic: Community Meeting    MCKINLEY Watkins    Pt did not participate in community meeting/ goals group at 0900 despite staff encouragement to attend.           Signature:  Audelia Watkins

## 2019-04-11 NOTE — BH NOTE
Psychiatric Admission Note  Psychiatric Nurse Practitioner         Fernando Michelle is a 47 y.o. male who was admitted from [facility] emergency room as a voluntary patient. She presented with suicidal ideations with plan to drink antifreeze. States he's been staying at the [facility] and is sober from alcohol for 3 weeks. Is having a difficult time coping with anxiety and depression due to sobriety. Reports being off medications for at least a week. Has poor support system. On the unit the patient is isolative to his room. He complains of anxiety that will notl stop. He is suicidal stating he is \"just sick of it all. \"  He is helpless and hopeless. He is irritable at times. Admits to poor appetite and poor sleep. Past Psychiatric History     Patient Reports noncompliance with outpatient psychiatric care. Reported history of psychiatric inpatient hospitalizations, was most recently at [facility]. Reported history of suicide attempts by alcohol. Past Psychiatric Medications    Viibryd, Seroquel, Trazodone, Lexapro, Zyprexa, Ativan, Librium, Prozac      History of Substance Abuse     Denies the use of illicit drugs. Reported alcohol use of a fifth of Vodka daily in the past. Reports he has not drank for 3 weeks. Family History of psychiatric disorders     Family history: denied . Medical History     Allergies:  Pcn [penicillins]   Past Medical History:   Diagnosis Date    Alcohol abuse 6/5/2014    GI bleed 6/5/2014    Hematemesis 6/5/2014    Hypertension     Intractable vomiting 6/5/2014    Suicidal behavior 12/13/2018      Past Surgical History:   Procedure Laterality Date    HERNIA REPAIR      left    UPPER GASTROINTESTINAL ENDOSCOPY  6-5-14    gastritis, esophagitis, ulcers-koko     Neurologic Exam     Mental Status   Oriented to person, place, and time. Oriented to person. Oriented to place. Oriented to country, city, area, street and number. Oriented to time. Oriented to year, month, date, day and season. Registration: recalls 3 of 3 objects. Recall at 5 minutes: recalls 3 of 3 objects. Follows 3 step commands. Attention: normal. Concentration: normal.   Speech: speech is normal   Level of consciousness: alert  Knowledge: consistent with education. Able to perform simple calculations. Able to name object. Able to read. Able to repeat. Able to write. Normal comprehension. Cranial Nerves   Cranial nerves II through XII intact. Motor Exam   Muscle bulk: normal  Overall muscle tone: normal    Strength   Strength 5/5 throughout. Sensory Exam   Light touch normal.     Gait, Coordination, and Reflexes     Gait  Gait: normal    Coordination   Romberg: negative    Tremor   Resting tremor: absent  Intention tremor: absent  Action tremor: absent    Reflexes   Reflexes 2+ except as noted. Right brachioradialis: 2+  Left brachioradialis: 2+  Right biceps: 2+  Left biceps: 2+  Right triceps: 2+  Left triceps: 2+  Right patellar: 2+  Left patellar: 2+  Right achilles: 2+  Left achilles: 2+  Right : 2+  Left : 2+          Social History    Patient was born and raised in [place]. Reportedly moved to the [facility] several months ago but patient provides vague details. Patient is currently living at the [facility]. He is  and estranged from his wife and children. He has no job and no income. He has reportedly been sober for 3 weeks but has a long history of alcohol abuse and noncompliance with treatment.      Social History     Socioeconomic History    Marital status:      Spouse name: Not on file    Number of children: Not on file    Years of education: Not on file    Highest education level: Not on file   Occupational History    Not on file   Social Needs    Financial resource strain: Not on file    Food insecurity:     Worry: Not on file     Inability: Not on file    Transportation needs:     Medical: Not on file     Non-medical: Not on file   Tobacco Use    Smoking status: Never Smoker    Smokeless tobacco: Never Used    Tobacco comment: pt is a nonsmioker   Substance and Sexual Activity    Alcohol use: Yes     Alcohol/week: 10.0 oz     Types: 20 Standard drinks or equivalent per week     Comment: 1/5 liquor daily when he can get it.  Drug use: No    Sexual activity: Not on file   Lifestyle    Physical activity:     Days per week: Not on file     Minutes per session: Not on file    Stress: Not on file   Relationships    Social connections:     Talks on phone: Not on file     Gets together: Not on file     Attends Jewish service: Not on file     Active member of club or organization: Not on file     Attends meetings of clubs or organizations: Not on file     Relationship status: Not on file    Intimate partner violence:     Fear of current or ex partner: Not on file     Emotionally abused: Not on file     Physically abused: Not on file     Forced sexual activity: Not on file   Other Topics Concern    Not on file   Social History Narrative    Not on file         Mental Status  Pt. was alert, fully oriented, and cooperative. Appearance and hygiene were disheveled, poor hygiene . Mood was depressed and anxious. Affect was irritable Thought process was linear and well-organized. Patient denied any hallucinations or paranoia. Patient endorsed suicidal ideations. Patient denied homicidal ideations . Patient's gross cognitive functions were intact. Insight and judgement were poor. Both recent and remote memory were intact. Psychomotor status was without abnormality     Diagnostic Impression  Active Problems:    Major depressive disorder, recurrent (HCC)    Severe major depression ([facility] [address]  Resolved Problems:    * No resolved hospital problems.  *      Recurrent Major Depressive Disorder, Alcohol Abuse      Medications     QUEtiapine  300 mg Oral Nightly    escitalopram  20 mg Oral Daily     acetaminophen, traZODone, benztropine

## 2019-04-11 NOTE — GROUP NOTE
Patient did not attend skills group which focused on budgeting from [id number] despite staff encouragement and prompts.

## 2019-04-11 NOTE — CARE COORDINATION
Clinical Summary: Patient is a  47year old  male admitted to the [facility] for SI with plan to jump off a bridge or drink antifreeze. Patient recently moved from [facility] from a sober living program back to [place] and was staying between shelters prior and now currently at the [facility]. Patient was under influence of alcohol as self-reported but no BAL completed at admission by ED. Patient reports ex-wife and hos 3 daughters live in [place] but he has no contact with them. Patient reports parents  and he has one brother and one sister that live in [place] but he has no contact with them. Patient reports sober time of 2 weeks and hx of drinking usually a minimal of a 5th daily of vodka when he has access or can steal it. Patient currently report SI with thoughts of death dying but no specific plan. Patient denies HI AH VH currently and reports his anxiety is very high, he is having withdrawal sxs and is feeling irritable. Patient Ox4, presented as irritable, guarded, poor eye contact, hygiene was poor and thinking was linear and patient was preoccupied with getting medications to help with his anxiety and withdrawal sxs.

## 2019-04-11 NOTE — H&P
HISTORY and [facility] [address]       NAME:  Elena Ansari  MRN: [id number]   YOB: 1970   Date: 4/11/2019   Age: 47 y.o. Gender: male     COMPLAINT AND PRESENT HISTORY:      Elena Ansari is 47 y.o.,  male, admitted because of depression. Pt has suicidal ideation, Pt has plans to drink antifreeze and get drunk vodka. Pt denies any homicidal ideation. Pt has a history of previous suicide attempts by overdosing on . Pt feels hopeless, helpless, worthless, lack of interest, loss of energy. Poor insight. Pt has poor sleep, loss of appetite, poor concentration and memory. No current auditory, visual or tactile hallucinations. Patient is a heavy drinker consumes a 5th of vodka per day. Patient lives alone, currently not working. Pt has been compliant with the psychiatric medications.       DIAGNOSTIC RESULTS   Labs:  CBC:   Recent Labs     04/11/19  0633   WBC 5.4   HGB 13.3*        BMP:    Recent Labs     04/11/19  0633      K 3.9      CO2 21   BUN 17   CREATININE 0.60*   GLUCOSE 81     Hepatic:   Recent Labs     04/11/19  0633   AST 11   ALT 7   BILITOT 0.34   ALKPHOS 51     Lipids:   Recent Labs     04/11/19  0633   CHOL 195   HDL 52     U/A:  Lab Results   Component Value Date    COLORU YELLOW 02/19/2019    WBCUA None 12/24/2014    RBCUA None 12/24/2014    MUCUS NOT REPORTED 12/24/2014    BACTERIA None 12/24/2014    SPECGRAV 1.014 02/19/2019    LEUKOCYTESUR NEGATIVE 02/19/2019    GLUCOSEU NEGATIVE 02/19/2019    AMORPHOUS NOT REPORTED 12/24/2014       PAST MEDICAL HISTORY     Past Medical History:   Diagnosis Date    Alcohol abuse 6/5/2014    GI bleed 6/5/2014    Hematemesis 6/5/2014    Hypertension     Intractable vomiting 6/5/2014    Suicidal behavior 12/13/2018       Pt denies any history of Diabetes mellitus type 2, stroke, heart disease, COPD, Asthma, GERD, HLD, Cancer, Seizures,Thyroid disease, Kidney Disease, Hepatitis, TB.    SURGICAL HISTORY       Past Surgical History:   Procedure Laterality Date    HERNIA REPAIR      left    UPPER GASTROINTESTINAL ENDOSCOPY  6-5-14    gastritis, esophagitis, ulcers-koko       FAMILY HISTORY       Family History   Adopted: Yes       SOCIAL HISTORY       Social History     Socioeconomic History    Marital status:      Spouse name: None    Number of children: None    Years of education: None    Highest education level: None   Occupational History    None   Social Needs    Financial resource strain: None    Food insecurity:     Worry: None     Inability: None    Transportation needs:     Medical: None     Non-medical: None   Tobacco Use    Smoking status: Never Smoker    Smokeless tobacco: Never Used    Tobacco comment: pt is a nonsmioker   Substance and Sexual Activity    Alcohol use: Yes     Alcohol/week: 10.0 oz     Types: 20 Standard drinks or equivalent per week     Comment: 1/5 liquor daily when he can get it.  Drug use: No    Sexual activity: None   Lifestyle    Physical activity:     Days per week: None     Minutes per session: None    Stress: None   Relationships    Social connections:     Talks on phone: None     Gets together: None     Attends Worship service: None     Active member of club or organization: None     Attends meetings of clubs or organizations: None     Relationship status: None    Intimate partner violence:     Fear of current or ex partner: None     Emotionally abused: None     Physically abused: None     Forced sexual activity: None   Other Topics Concern    None   Social History Narrative    None           REVIEW OF SYSTEMS      Allergies   Allergen Reactions    Pcn [Penicillins] Rash       No current facility-administered medications on file prior to encounter.       Current Outpatient Medications on File Prior to Encounter   Medication Sig Dispense Refill    traZODone (DESYREL) 100 MG tablet Take 100 mg by mouth nightly      escitalopram (LEXAPRO) 20 MG tablet Take 20 mg by mouth daily      QUEtiapine (SEROQUEL) 300 MG tablet Take 300 mg by mouth nightly                        General health:  Fairly good. No fever or chills. Skin:  No itching, redness or rash. Head, eyes, ears, nose, throat:  No headache, epistaxis, rhinorrhea hearing loss or sore throat. Neck:  No pain, stiffness or masses. Cardiovascular/Respiratory system:  No chest pain, palpitation, shortness of breath, coughing or expectoration. Gastrointestinal tract: No abdominal pain, nausea, vomiting, diarrhea or constipation. Genitourinary:  No burning on micturition. No hesitancy, urgency, frequency or discoloration of urine. Locomotor:  No bone or joint pains. No swelling or deformities. Neuropsychiatric:  See HPI. GENERAL PHYSICAL EXAM:     Vitals: /75   Pulse 82   Temp 98.1 °F (36.7 °C) (Oral)   Resp 12   Ht 5' 9\" (1.753 m)   Wt 135 lb (61.2 kg)   SpO2 98%   BMI 19.94 kg/m²  Body mass index is 19.94 kg/m². Pt was examined with a nurse present in the room. GENERAL APPEARANCE:  Leticia Vences is 47 y.o.,  male, not obese, nourished, conscious, alert. Does not appear to be distress or pain at this time. SKIN:  Warm, dry, no cyanosis or jaundice. HEAD:  Normocephalic, atraumatic, no swelling or tenderness. EYES:  Pupils equal, reactive to light, Conjunctiva is clear, EOMs intact bimal. eyelids WNL. EARS:  No discharge, no marked hearing loss. NOSE:  No rhinorrhea, epistaxis or septal deformity. THROAT:  Not congested. No ulceration bleeding or discharge. NECK:  No stiffness, trachea central.  No palpable masses or L.N.      CHEST:  Symmetrical and equal on expansion. HEART:  Regular rate and rhythm. S1 > S2, No audible murmurs or gallops. LUNGS:  Equal on expansion, normal breath sounds. No adventitious sounds. ABDOMEN: Soft on palpation.

## 2019-04-12 PROCEDURE — 99232 SBSQ HOSP IP/OBS MODERATE 35: CPT | Performed by: PSYCHIATRY & NEUROLOGY

## 2019-04-12 PROCEDURE — 6370000000 HC RX 637 (ALT 250 FOR IP): Performed by: NURSE PRACTITIONER

## 2019-04-12 PROCEDURE — 6370000000 HC RX 637 (ALT 250 FOR IP): Performed by: PSYCHIATRY & NEUROLOGY

## 2019-04-12 PROCEDURE — 1240000000 HC EMOTIONAL WELLNESS R&B

## 2019-04-12 RX ORDER — QUETIAPINE FUMARATE 50 MG/1
50 TABLET, FILM COATED ORAL 2 TIMES DAILY
Status: DISCONTINUED | OUTPATIENT
Start: 2019-04-12 | End: 2019-04-14

## 2019-04-12 RX ADMIN — ESCITALOPRAM OXALATE 20 MG: 20 TABLET ORAL at 08:13

## 2019-04-12 RX ADMIN — QUETIAPINE FUMARATE 300 MG: 300 TABLET ORAL at 20:47

## 2019-04-12 RX ADMIN — TRAZODONE HYDROCHLORIDE 50 MG: 50 TABLET ORAL at 20:47

## 2019-04-12 RX ADMIN — ACETAMINOPHEN 650 MG: 325 TABLET, FILM COATED ORAL at 12:07

## 2019-04-12 RX ADMIN — HYDROXYZINE HYDROCHLORIDE 25 MG: 25 TABLET, FILM COATED ORAL at 20:47

## 2019-04-12 RX ADMIN — QUETIAPINE FUMARATE 50 MG: 50 TABLET ORAL at 12:41

## 2019-04-12 RX ADMIN — HYDROXYZINE HYDROCHLORIDE 25 MG: 25 TABLET, FILM COATED ORAL at 12:08

## 2019-04-12 ASSESSMENT — PAIN DESCRIPTION - LOCATION: LOCATION: HEAD

## 2019-04-12 ASSESSMENT — PAIN SCALES - GENERAL
PAINLEVEL_OUTOF10: 0
PAINLEVEL_OUTOF10: 5

## 2019-04-12 ASSESSMENT — PAIN DESCRIPTION - DESCRIPTORS: DESCRIPTORS: ACHING

## 2019-04-12 ASSESSMENT — PAIN DESCRIPTION - PAIN TYPE: TYPE: ACUTE PAIN

## 2019-04-12 NOTE — GROUP NOTE
Group Therapy Note    Date: April 12    Group Start Time: 1600  Group End Time: 1630  Group Topic: Healthy Living/Wellness    [address]        Group Therapy Note    Attendees: 8/16         Patient's Goal:  Introspection    Notes:  Refused group despite invitation and encouragement to attend      Signature:  [employer]

## 2019-04-12 NOTE — PROGRESS NOTES
Department of Psychiatry  Attending Physician Progress Note    Chief Complaint: Major depressive disorder, recurrent ([address]     SUBJECTIVE:     The patient was feeling very anxious to the extent that he said was causing him headaches and stomach pain. He was also depressed and continues to have suicidal ideations. He said he hears voices telling him that he will not go to hell if he would kill himself. He asked for a higher dose of Seroquel/ it was during the daytime. He continues to have active suicidal thoughts. There was no major side effects. There is no safe alternative other than the hospital treatment at this time. OBJECTIVE    Physical  VITALS:    /73   Pulse 74   Temp 97.7 °F (36.5 °C) (Oral)   Resp 14   Ht 5' 9\" (1.753 m)   Wt 135 lb (61.2 kg)   SpO2 98%   BMI 19.94 kg/m²     Mental Status Examination:    Level of consciousness:  Within normal limits  Appearance: Street clothes, seated, with good grooming  Behavior/Motor: psychomotor retardation  Attitude toward examiner:  Cooperative, attentive, good eye contact  Speech:  spontaneous, normal rate, normal volume and well articulated  Mood:  Depressed and anxious  Affect:  Mood-congruent, constricted in range. Thought processes:  linear, goal-directed and coherent  Thought content:  denies homicidal ideation  Suicidal Ideation:  suicidal ideation  Delusions:  no evidence of delusions  Perceptual Disturbance:  No visual or auditory hallucinations. Cognition:  Intact  Memory: grossly intact.   Insight & Judgement: partial       Medications  Current Facility-Administered Medications: QUEtiapine (SEROQUEL) tablet 50 mg, 50 mg, Oral, BID  QUEtiapine (SEROQUEL) tablet 300 mg, 300 mg, Oral, Nightly  escitalopram (LEXAPRO) tablet 20 mg, 20 mg, Oral, Daily  acetaminophen (TYLENOL) tablet 650 mg, 650 mg, Oral, Q4H PRN  traZODone (DESYREL) tablet 50 mg, 50 mg, Oral, Nightly PRN  benztropine mesylate (COGENTIN) injection 2 mg, 2 mg, Intramuscular, BID PRN  magnesium hydroxide (MILK OF MAGNESIA) 400 MG/5ML suspension 30 mL, 30 mL, Oral, Daily PRN  aluminum & magnesium hydroxide-simethicone (MAALOX) 200-200-20 MG/5ML suspension 30 mL, 30 mL, Oral, Q6H PRN  hydrOXYzine (ATARAX) tablet 25 mg, 25 mg, Oral, TID PRN    ASSESSMENT     Principal Problem:    Major depressive disorder, recurrent (HCC)  Active Problems:    Alcohol abuse    Depression    Severe major depression ([facility] [address]  Resolved Problems:    * No resolved hospital problems. *      PLAN    · We will increase Seroquel to 50 mg +50 mg +300 mg daily. He gave informed consent. · Continue Lexapro and trazodone. · Continue unit milieu and group psychotherapy.       Electronically Signed by Nasim Rao MD , 4/12/2019 5:43 PM

## 2019-04-12 NOTE — GROUP NOTE
Group Therapy Note    Date: April 12    Group Start Time: 1000  Group End Time: 1030  Group Topic: Psychoeducation    MCKINLEY BHDANIELE Torre    Pt declined to attend psychoeducation at 1000 am despite encouragement. Pt offered 1:1 and refused.      Signature:  DANIELE Sharif

## 2019-04-12 NOTE — PLAN OF CARE
Problem: Depressive Behavior With or Without Suicide Precautions:  Goal: Able to verbalize and/or display a decrease in depressive symptoms  Description  Able to verbalize and/or display a decrease in depressive symptoms   4/11/2019 2158 by Hilton Hernandez RN  Outcome: Ongoing  Note:   Patient admits to suicidal thoughts but has no plan. Patient states he can contract for safety. Patient has been somewhat social with peers, patient came out for night snack and was out for several hours. Patient denies homicidal thoughts. Patient admits to auditory hallucinations that are getting louder, patient denies that they are commanding. Patient states his medications are not helping and states he will talk to the doctor tomorrow about this as he has not discussed this yet with the doctor. Problem: Depressive Behavior With or Without Suicide Precautions:  Goal: Ability to disclose and discuss suicidal ideas will improve  Description  Ability to disclose and discuss suicidal ideas will improve   4/11/2019 2158 by Hilton Hernandez RN  Outcome: Ongoing  Note:   Patient admits to suicidal thoughts but has no plan. Patient states he can contract for safety. Patient has been somewhat social with peers, patient came out for night snack and was out for several hours. Patient denies homicidal thoughts. Patient admits to auditory hallucinations that are getting louder, patient denies that they are commanding. Patient states his medications are not helping and states he will talk to the doctor tomorrow about this as he has not discussed this yet with the doctor. Problem: Suicide risk  Goal: Provide patient with safe environment  Description  Provide patient with safe environment   4/11/2019 2158 by Hilton Hernandez RN  Outcome: Ongoing  Note:   Patient admits to suicidal thoughts but has no plan. Patient states he can contract for safety.  Patient has been somewhat social with peers, patient came out for night snack and was out for several hours. Patient denies homicidal thoughts. Patient admits to auditory hallucinations that are getting louder, patient denies that they are commanding. Patient states his medications are not helping and states he will talk to the doctor tomorrow about this as he has not discussed this yet with the doctor.

## 2019-04-12 NOTE — GROUP NOTE
Group Therapy Note    Date: April 12    Group Start Time: 0900  Group End Time: 0910  Group Topic: Community Meeting    ROSA Hwang    Pt did not attend community meeting and goal setting group at 0900 despite staff invitation to attend.

## 2019-04-12 NOTE — PLAN OF CARE
Daily Sleep (WDL): Within Defined Limits         Patient Currently in Pain: Denies  Daily Nutrition (WDL): Exceptions to WDL  Appetite Change: No appetite  Barriers to Nutrition: None  Level of Assistance: Independent/Self    Patient Monitoring:  Frequency of Checks: 4 times per hour, close    Psychiatric Symptoms:   Depression Symptoms  Depression Symptoms: Feelings of hopelessess, Feelings of helplessness, Isolative, Loss of interest  Anxiety Symptoms  Anxiety Symptoms: Generalized  Katlyn Symptoms  Katlyn Symptoms: No problems reported or observed. Psychosis Symptoms  Delusion Type: No problems reported or observed. Suicide Risk CSSR-S:  1) Within the past month, have you wished you were dead or wished you could go to sleep and not wake up? : Yes  2) Have you actually had any thoughts of killing yourself? : Yes  3) Have you been thinking about how you might kill yourself? : Yes  5) Have you started to work out or worked out the details of how to kill yourself?  Do you intend to carry out this plan? : Yes  6) Have you ever done anything, started to do anything, or prepared to do anything to end your life?: Yes  Change in Result:  Change in Plan of care:       EDUCATION:   Learner Progress Toward Treatment Goals:  Reviewed results and recommendations of this team, Reviewed group plan and strategies, Reviewed signs, symptoms and risk of self harm and violent behavior, Reviewed goals and plan of care    Method:  small group, individual verbal education    Outcome:  Verbalized by patient but needs reinforcement to obtain goals    PATIENT GOALS:  Short term:  Pt declined to participate  Long term:  Pt declined to participate    PLAN/TREATMENT RECOMMENDATIONS UPDATE:  continue with group therapies, increased socialization, continue planning for after discharge goals, continue with medication compliance    SHORT-TERM GOALS UPDATE:   Time frame for Short-Term Goals:  5-7 days    LONG-TERM GOALS UPDATE:   Time frame for Long-Term Goals:  6 months    Members Present in Team Meeting:   See signature sheet  Problem: Depressive Behavior With or Without Suicide Precautions:  Goal: Able to verbalize and/or display a decrease in depressive symptoms  Description  Able to verbalize and/or display a decrease in depressive symptoms   4/12/2019 1012 by Jojo Ceja LPN  Outcome: Ongoing     Problem: Suicide risk  Goal: Provide patient with safe environment  Description  Provide patient with safe environment   4/12/2019 1012 by Jojo Ceja LPN  Outcome: Ongoing     Tk Anna, NICANORS

## 2019-04-12 NOTE — GROUP NOTE
Group Therapy Note    Date: April 12    Group Start Time: 1430  Group End Time: 1500  Group Topic: Recreational    [facility] MONICA Gallardo, NICANORS    Pt did not attend leisure skills group at [facility] despite staff invitation to attend.

## 2019-04-12 NOTE — PLAN OF CARE
Patient admit to depression 7 out of 10 ; PT encouraged to continue his medication regime,attend groups and seek staff for all needs while ion the unit. Patient remain safe while on the. 15 MIN safety checks maintained.

## 2019-04-12 NOTE — GROUP NOTE
Group Therapy Note    Date: April 12    Group Start Time: 1340  Group End Time: 1430  Group Topic: Psychoeducation    MCKINLEY Mckinney, CTRS    Pt did not attend music trivia and communication skills group at [phone] despite staff invitation to attend.

## 2019-04-13 PROCEDURE — 1240000000 HC EMOTIONAL WELLNESS R&B

## 2019-04-13 PROCEDURE — 6370000000 HC RX 637 (ALT 250 FOR IP): Performed by: NURSE PRACTITIONER

## 2019-04-13 PROCEDURE — 99232 SBSQ HOSP IP/OBS MODERATE 35: CPT | Performed by: PSYCHIATRY & NEUROLOGY

## 2019-04-13 PROCEDURE — 6370000000 HC RX 637 (ALT 250 FOR IP): Performed by: PSYCHIATRY & NEUROLOGY

## 2019-04-13 RX ADMIN — QUETIAPINE FUMARATE 300 MG: 300 TABLET ORAL at 20:35

## 2019-04-13 RX ADMIN — TRAZODONE HYDROCHLORIDE 50 MG: 50 TABLET ORAL at 20:34

## 2019-04-13 RX ADMIN — ESCITALOPRAM OXALATE 20 MG: 20 TABLET ORAL at 09:23

## 2019-04-13 RX ADMIN — QUETIAPINE FUMARATE 50 MG: 50 TABLET ORAL at 09:23

## 2019-04-13 RX ADMIN — QUETIAPINE FUMARATE 50 MG: 50 TABLET ORAL at 15:30

## 2019-04-13 NOTE — PLAN OF CARE
Problem: Depressive Behavior With or Without Suicide Precautions:  Goal: Able to verbalize and/or display a decrease in depressive symptoms  Description  Able to verbalize and/or display a decrease in depressive symptoms   4/12/2019 2122 by Anastacio Sanches LPN  Outcome: Ongoing  Note:   Pt admits to depression at this time, Pt was isolative to his room at  beginning of shift but later came out to watch TV. Problem: Depressive Behavior With or Without Suicide Precautions:  Goal: Ability to disclose and discuss suicidal ideas will improve  Description  Ability to disclose and discuss suicidal ideas will improve   Outcome: Ongoing  Note:   Pt admits to fleeting SI with no plans, contracts for safety. Problem: Depressive Behavior With or Without Suicide Precautions:  Goal: Absence of self-harm  Description  Absence of self-harm   Outcome: Ongoing  Note:   Pt denies thoughts of self-harm at this time.

## 2019-04-13 NOTE — GROUP NOTE
Patient did not attend creative expression from  despite staff encouragement and prompts.     Signature:  Cortes Mueller Oelrichs

## 2019-04-13 NOTE — PLAN OF CARE
Pt. Denies any current suicidal thoughts and agrees to seek out staff if thoughts arise. Pt. Admits to depression and anxiety, Denies any hallucinations. Pt. Has been laying in bed most of the morning, and did not come out breakfast. Pt. Cooperative with staff and medication compliant.

## 2019-04-13 NOTE — GROUP NOTE
Patient did not attend goal setting and community meeting from [phone] despite staff encouragement and prompts.

## 2019-04-14 PROCEDURE — 6370000000 HC RX 637 (ALT 250 FOR IP): Performed by: NURSE PRACTITIONER

## 2019-04-14 PROCEDURE — 99232 SBSQ HOSP IP/OBS MODERATE 35: CPT | Performed by: PSYCHIATRY & NEUROLOGY

## 2019-04-14 PROCEDURE — 6370000000 HC RX 637 (ALT 250 FOR IP): Performed by: PSYCHIATRY & NEUROLOGY

## 2019-04-14 PROCEDURE — 1240000000 HC EMOTIONAL WELLNESS R&B

## 2019-04-14 RX ORDER — QUETIAPINE FUMARATE 100 MG/1
100 TABLET, FILM COATED ORAL 2 TIMES DAILY
Status: DISCONTINUED | OUTPATIENT
Start: 2019-04-15 | End: 2019-04-16 | Stop reason: HOSPADM

## 2019-04-14 RX ADMIN — HYDROXYZINE HYDROCHLORIDE 25 MG: 25 TABLET, FILM COATED ORAL at 08:59

## 2019-04-14 RX ADMIN — ESCITALOPRAM OXALATE 20 MG: 20 TABLET ORAL at 08:59

## 2019-04-14 RX ADMIN — QUETIAPINE FUMARATE 50 MG: 50 TABLET ORAL at 08:59

## 2019-04-14 RX ADMIN — QUETIAPINE FUMARATE 300 MG: 300 TABLET ORAL at 20:34

## 2019-04-14 RX ADMIN — TRAZODONE HYDROCHLORIDE 50 MG: 50 TABLET ORAL at 20:34

## 2019-04-14 RX ADMIN — QUETIAPINE FUMARATE 50 MG: 50 TABLET ORAL at 15:05

## 2019-04-14 NOTE — PLAN OF CARE
Problem: Depressive Behavior With or Without Suicide Precautions:  Goal: Able to verbalize and/or display a decrease in depressive symptoms  Description  Able to verbalize and/or display a decrease in depressive symptoms   Outcome: Ongoing  Note:   Patient reports depression 10/10. He is helpless, hopeless, and states the medications are not doing anything for his depression and he knows it will always be there. He has been out in the milieu, aloof with peers and calm. Q15min safety checks continue     Problem: Depressive Behavior With or Without Suicide Precautions:  Goal: Ability to disclose and discuss suicidal ideas will improve  Description  Ability to disclose and discuss suicidal ideas will improve   Outcome: Ongoing  Note:   Patient denies suicidal ideations at this time. Patient agrees to seek out staff if they begin having suicidal ideations or need to talk. Q15min safety checks continue      Problem: Depressive Behavior With or Without Suicide Precautions:  Goal: Absence of self-harm  Description  Absence of self-harm   4/13/2019 2055 by Enrique Be LPN  Outcome: Ongoing  Note:   Patient denies thoughts of self harm at this time. Patient agrees to seek out staff if they begin having thoughts of harming self or they need to talk.  Q15min safety checks continue

## 2019-04-14 NOTE — PLAN OF CARE
Problem: Depressive Behavior With or Without Suicide Precautions:  Goal: Absence of self-harm  Description  Absence of self-harm   Outcome: Ongoing  Note:   Pt refrained from self-harm during shift. He denies thoughts of self-harm at this time. Pt knows to seek staff support if thoughts of self-harm are present. Safety checks maintained q15 min and irregular rounding maintained. Problem: Suicide risk  Goal: Provide patient with safe environment  Description  Provide patient with safe environment   Note:   Safety room checks completed during shift. Hazardous objects are absent from patient's room. Safety checks maintained q15 min and irregular rounding maintained.

## 2019-04-14 NOTE — GROUP NOTE
Patient did not attend skills group which focused on self inventory and increasing positive coping skills.     Signature:  Leni Holt, [address]

## 2019-04-14 NOTE — GROUP NOTE
Group Therapy Note    Date: April 14    Group Start Time: 0900  Group End Time: 0930  Group Topic: Community Meeting    [address] [place] [address]        Group Therapy Note    Attendees: 6/18         Patient's Goal:   Set daily goals     Notes:       Status After Intervention:  Improved    Participation Level:  Active Listener    Participation Quality: Appropriate and Attentive      Speech:  normal      Thought Process/Content: Logical      Affective Functioning: Congruent      Mood: euphoric      Level of consciousness:  Alert and Oriented x4      Response to Learning: Able to verbalize current knowledge/experience and Able to verbalize/acknowledge new learning      Endings: None Reported    Modes of Intervention: Socialization, Exploration and Clarifying      Discipline Responsible: Psychoeducational Specialist      Signature:  Saba Abbasi

## 2019-04-14 NOTE — GROUP NOTE
Group Therapy Note    Date: April 14    Group Start Time: 1600  Group End Time: 1630  Group Topic: Group Documentation    [address] [facility] JOS Cha; Nate Shelley RN        Group Therapy Note    Attendees: 19/19         Patient's Goal:  To Maintain Safety while on Unit    Status After Intervention:  Improved    Participation Level:  Active Listener    Participation Quality: Appropriate      Speech:  normal      Thought Process/Content: Logical      Affective Functioning: Blunted      Mood: anxious      Level of consciousness:  Alert and Oriented x4      Response to Learning: Able to verbalize current knowledge/experience      Endings: None Reported    Modes of Intervention: Education      Discipline Responsible: Licensed Practical Nurse      Signature:  Goldie Heranndez LPN

## 2019-04-14 NOTE — GROUP NOTE
Group Therapy Note    Date: April 14    Group Start Time: 2045  Group End Time: 2115  Group Topic: Wrap-Up    [facility] TOMER Wright LPN        Group Therapy Note    Attendees: 12/16         Patient was encouraged to attend evening wrap up group with staff encouragement. Patient declined to attend. Patient will continue to be encouraged to attend groups. Signature:   Adilene Wright LPN

## 2019-04-14 NOTE — PROGRESS NOTES
Department of Psychiatry  Attending Physician Progress Note    Chief Complaint: Major depressive disorder, recurrent ([address]     SUBJECTIVE:     The patient continues to feel very anxious but less than yesterday. He said he slept better last night. The suicidal ideations we are less today. He said he hears voices telling him that he will not go to hell if he would kill himself. There was no major side effects but he complained of mild dry mouth. There is no safe alternative other than the hospital treatment at this time. OBJECTIVE    Physical  VITALS:    /75   Pulse 81   Temp 97.9 °F (36.6 °C) (Oral)   Resp 14   Ht 5' 9\" (1.753 m)   Wt 135 lb (61.2 kg)   SpO2 98%   BMI 19.94 kg/m²     Mental Status Examination:    Level of consciousness:  Within normal limits  Appearance: Street clothes, seated, with good grooming  Behavior/Motor: psychomotor retardation  Attitude toward examiner:  Cooperative, attentive, good eye contact  Speech:  spontaneous, normal rate, normal volume and well articulated  Mood:  Depressed and anxious  Affect:  Mood-congruent, constricted in range. Thought processes:  linear, goal-directed and coherent  Thought content:  denies homicidal ideation  Suicidal Ideation:  Less suicidal ideation  Delusions:  no evidence of delusions  Perceptual Disturbance:  No visual or auditory hallucinations. Cognition:  Intact  Memory: grossly intact.   Insight & Judgement: partial       Medications  Current Facility-Administered Medications: QUEtiapine (SEROQUEL) tablet 50 mg, 50 mg, Oral, BID  QUEtiapine (SEROQUEL) tablet 300 mg, 300 mg, Oral, Nightly  escitalopram (LEXAPRO) tablet 20 mg, 20 mg, Oral, Daily  acetaminophen (TYLENOL) tablet 650 mg, 650 mg, Oral, Q4H PRN  traZODone (DESYREL) tablet 50 mg, 50 mg, Oral, Nightly PRN  benztropine mesylate (COGENTIN) injection 2 mg, 2 mg, Intramuscular, BID PRN  magnesium hydroxide (MILK OF MAGNESIA) 400 MG/5ML suspension 30 mL, 30 mL, Oral, Daily PRN  aluminum & magnesium hydroxide-simethicone (MAALOX) 200-200-20 MG/5ML suspension 30 mL, 30 mL, Oral, Q6H PRN  hydrOXYzine (ATARAX) tablet 25 mg, 25 mg, Oral, TID PRN    ASSESSMENT     Principal Problem:    Major depressive disorder, recurrent (HCC)  Active Problems:    Alcohol abuse    Depression    Severe major depression ([address]  Resolved Problems:    * No resolved hospital problems. *      PLAN    · We increased Seroquel to 50 mg +50 mg +300 mg daily. · Continue Lexapro and trazodone. · Continue unit milieu and group psychotherapy.       Electronically Signed by Nasim Rao MD , 4/13/2019 8:12 PM

## 2019-04-15 PROCEDURE — 6370000000 HC RX 637 (ALT 250 FOR IP): Performed by: NURSE PRACTITIONER

## 2019-04-15 PROCEDURE — 6370000000 HC RX 637 (ALT 250 FOR IP): Performed by: PSYCHIATRY & NEUROLOGY

## 2019-04-15 PROCEDURE — 99232 SBSQ HOSP IP/OBS MODERATE 35: CPT | Performed by: PSYCHIATRY & NEUROLOGY

## 2019-04-15 PROCEDURE — 1240000000 HC EMOTIONAL WELLNESS R&B

## 2019-04-15 RX ORDER — TRAZODONE HYDROCHLORIDE 50 MG/1
50 TABLET ORAL NIGHTLY PRN
Qty: 30 TABLET | Refills: 0 | Status: SHIPPED | OUTPATIENT
Start: 2019-04-15

## 2019-04-15 RX ORDER — QUETIAPINE FUMARATE 100 MG/1
100 TABLET, FILM COATED ORAL 2 TIMES DAILY
Qty: 60 TABLET | Refills: 0 | Status: SHIPPED | OUTPATIENT
Start: 2019-04-16 | End: 2019-05-15 | Stop reason: ALTCHOICE

## 2019-04-15 RX ORDER — QUETIAPINE FUMARATE 300 MG/1
300 TABLET, FILM COATED ORAL NIGHTLY
Qty: 30 TABLET | Refills: 0 | Status: SHIPPED | OUTPATIENT
Start: 2019-04-15

## 2019-04-15 RX ORDER — HYDROXYZINE HYDROCHLORIDE 25 MG/1
25 TABLET, FILM COATED ORAL 3 TIMES DAILY PRN
Qty: 30 TABLET | Refills: 0 | Status: SHIPPED | OUTPATIENT
Start: 2019-04-15 | End: 2019-04-25

## 2019-04-15 RX ORDER — ESCITALOPRAM OXALATE 20 MG/1
20 TABLET ORAL DAILY
Qty: 30 TABLET | Refills: 0 | Status: ON HOLD | OUTPATIENT
Start: 2019-04-15 | End: 2019-06-13

## 2019-04-15 RX ADMIN — HYDROXYZINE HYDROCHLORIDE 25 MG: 25 TABLET, FILM COATED ORAL at 20:58

## 2019-04-15 RX ADMIN — TRAZODONE HYDROCHLORIDE 50 MG: 50 TABLET ORAL at 20:58

## 2019-04-15 RX ADMIN — QUETIAPINE FUMARATE 300 MG: 300 TABLET ORAL at 20:58

## 2019-04-15 RX ADMIN — QUETIAPINE FUMARATE 100 MG: 100 TABLET ORAL at 08:44

## 2019-04-15 RX ADMIN — QUETIAPINE FUMARATE 100 MG: 100 TABLET ORAL at 15:28

## 2019-04-15 RX ADMIN — HYDROXYZINE HYDROCHLORIDE 25 MG: 25 TABLET, FILM COATED ORAL at 15:28

## 2019-04-15 RX ADMIN — ESCITALOPRAM OXALATE 20 MG: 20 TABLET ORAL at 08:44

## 2019-04-15 NOTE — GROUP NOTE
Group Therapy Note    Date: April 15    Group Start Time: 1000  Group End Time: 1050  Group Topic: Recreational    [facility] ROSA Price    Pt did not attend leisure skills and trivia group at 1000 despite staff invitation to attend.

## 2019-04-15 NOTE — PROGRESS NOTES
Department of Psychiatry  Attending Physician Progress Note    Chief Complaint: Major depressive disorder, recurrent ([address]     SUBJECTIVE:     The patient was less anxious today but more depressed and regretful. He said he has regrets about the way he treated his girlfriend and drinking alcohol for years. The suicidal ideations we are less today. Auditory hallucinations are less  There was no major side effects but he complained of mild dry mouth. There is no safe alternative other than the hospital treatment at this time. OBJECTIVE    Physical  VITALS:    /64   Pulse 77   Temp 98.1 °F (36.7 °C) (Oral)   Resp 14   Ht 5' 9\" (1.753 m)   Wt 135 lb (61.2 kg)   SpO2 98%   BMI 19.94 kg/m²     Mental Status Examination:    Level of consciousness:  Within normal limits  Appearance: Street clothes, seated, with good grooming  Behavior/Motor: psychomotor retardation  Attitude toward examiner:  Cooperative, attentive, good eye contact  Speech:  spontaneous, normal rate, normal volume and well articulated  Mood:  Depressed and anxious  Affect:  Mood-congruent, constricted in range. Thought processes:  linear, goal-directed and coherent  Thought content:  denies homicidal ideation  Suicidal Ideation:  Less suicidal ideation  Delusions:  no evidence of delusions  Perceptual Disturbance:  No visual or auditory hallucinations. Cognition:  Intact  Memory: grossly intact.   Insight & Judgement: partial       Medications  Current Facility-Administered Medications: QUEtiapine (SEROQUEL) tablet 50 mg, 50 mg, Oral, BID  QUEtiapine (SEROQUEL) tablet 300 mg, 300 mg, Oral, Nightly  escitalopram (LEXAPRO) tablet 20 mg, 20 mg, Oral, Daily  acetaminophen (TYLENOL) tablet 650 mg, 650 mg, Oral, Q4H PRN  traZODone (DESYREL) tablet 50 mg, 50 mg, Oral, Nightly PRN  benztropine mesylate (COGENTIN) injection 2 mg, 2 mg, Intramuscular, BID PRN  magnesium hydroxide (MILK OF MAGNESIA) 400 MG/5ML suspension 30 mL, 30 mL, Oral, Daily PRN  aluminum & magnesium hydroxide-simethicone (MAALOX) 200-200-20 MG/5ML suspension 30 mL, 30 mL, Oral, Q6H PRN  hydrOXYzine (ATARAX) tablet 25 mg, 25 mg, Oral, TID PRN    ASSESSMENT     Principal Problem:    Major depressive disorder, recurrent (HCC)  Active Problems:    Alcohol abuse    Depression    Severe major depression ([address]  Resolved Problems:    * No resolved hospital problems. *      PLAN    · increase Seroquel further to 100 mg +100 mg +300 mg daily. · Continue Lexapro and trazodone. · Continue unit milieu and group psychotherapy.       Electronically Signed by Humberto Ribeiro MD , 4/14/2019 8:02 PM

## 2019-04-15 NOTE — GROUP NOTE
Group Therapy Note    Date: April 15    Group Start Time: 1330  Group End Time: 0231  Group Topic: Recreational    [facility]    Norm Boys    Pt did not participate in leisure/ cognitive skills group at 1330 despite staff encouragement to attend.         Signature:  Luca Shen

## 2019-04-15 NOTE — CARE COORDINATION
Clinician called and spoke with Latosha Hopkins and updated him on status of patient and he reports they will continue to hold bed for patient and give them a 24 hour notice when ready for discharge.

## 2019-04-15 NOTE — BH NOTE
This RN has reviewed and agrees with all the documentation entered by [facility].

## 2019-04-15 NOTE — PLAN OF CARE
Free from self-harm,denies suicidal/homicidal ideation, denies hallucinations. Reports no issues with sleep or appetite. Fifteen minute checks maintained for patient safety.

## 2019-04-16 VITALS
OXYGEN SATURATION: 98 % | RESPIRATION RATE: 14 BRPM | SYSTOLIC BLOOD PRESSURE: 104 MMHG | HEART RATE: 65 BPM | HEIGHT: 69 IN | DIASTOLIC BLOOD PRESSURE: 70 MMHG | WEIGHT: 135 LBS | TEMPERATURE: 96.8 F | BODY MASS INDEX: 19.99 KG/M2

## 2019-04-16 PROCEDURE — 6370000000 HC RX 637 (ALT 250 FOR IP): Performed by: NURSE PRACTITIONER

## 2019-04-16 PROCEDURE — 6370000000 HC RX 637 (ALT 250 FOR IP): Performed by: PSYCHIATRY & NEUROLOGY

## 2019-04-16 PROCEDURE — 99238 HOSP IP/OBS DSCHRG MGMT 30/<: CPT | Performed by: PSYCHIATRY & NEUROLOGY

## 2019-04-16 RX ADMIN — ESCITALOPRAM OXALATE 20 MG: 20 TABLET ORAL at 08:17

## 2019-04-16 RX ADMIN — QUETIAPINE FUMARATE 100 MG: 100 TABLET ORAL at 08:17

## 2019-04-16 NOTE — PROGRESS NOTES
Department of Psychiatry  Attending Physician Progress Note    Chief Complaint: Major depressive disorder, recurrent ([address]     SUBJECTIVE:     The patient noted significant improvement in his mood and asked if he could be discharged. He continues to have depression but was trying to think positively. He slept better last night. .    He denied any suicidal ideations today so far. There was no Auditory hallucinations today. There was no major side effects but he complained of mild dry mouth. There is no safe alternative other than the hospital treatment at this time. OBJECTIVE    Physical  VITALS:    /71   Pulse 75   Temp 98.2 °F (36.8 °C) (Oral)   Resp 14   Ht 5' 9\" (1.753 m)   Wt 135 lb (61.2 kg)   SpO2 98%   BMI 19.94 kg/m²     Mental Status Examination:    Level of consciousness:  Within normal limits  Appearance: Street clothes, seated, with good grooming  Behavior/Motor: psychomotor retardation  Attitude toward examiner:  Cooperative, attentive, good eye contact  Speech:  spontaneous, normal rate, normal volume and well articulated  Mood:  Depressed and anxious  Affect:  Mood-congruent, constricted in range. Thought processes:  linear, goal-directed and coherent  Thought content:  denies homicidal ideation  Suicidal Ideation:  no suicidal ideation  Delusions:  no evidence of delusions  Perceptual Disturbance:  No visual or auditory hallucinations. Cognition:  Intact  Memory: grossly intact.   Insight & Judgement: partial       Medications  Current Facility-Administered Medications: QUEtiapine (SEROQUEL) tablet 100 mg, 100 mg, Oral, BID  QUEtiapine (SEROQUEL) tablet 300 mg, 300 mg, Oral, Nightly  escitalopram (LEXAPRO) tablet 20 mg, 20 mg, Oral, Daily  acetaminophen (TYLENOL) tablet 650 mg, 650 mg, Oral, Q4H PRN  traZODone (DESYREL) tablet 50 mg, 50 mg, Oral, Nightly PRN  benztropine mesylate (COGENTIN) injection 2 mg, 2 mg, Intramuscular, BID PRN  magnesium hydroxide (MILK OF MAGNESIA) 400 MG/5ML suspension 30 mL, 30 mL, Oral, Daily PRN  aluminum & magnesium hydroxide-simethicone (MAALOX) 200-200-20 MG/5ML suspension 30 mL, 30 mL, Oral, Q6H PRN  hydrOXYzine (ATARAX) tablet 25 mg, 25 mg, Oral, TID PRN    ASSESSMENT     Principal Problem:    Major depressive disorder, recurrent (HCC)  Active Problems:    Alcohol abuse    Depression    Severe major depression ([facility] [address]  Resolved Problems:    * No resolved hospital problems. *      PLAN    · Continue increased Seroquel 100 mg +100 mg +300 mg daily. · Continue Lexapro and trazodone. · Continue unit milieu and group psychotherapy.       Electronically Signed by La Nena Arana MD , 4/15/2019 10:07 PM

## 2019-04-16 NOTE — PLAN OF CARE
Problem: Depressive Behavior With or Without Suicide Precautions:  Goal: Ability to disclose and discuss suicidal ideas will improve  Description  Ability to disclose and discuss suicidal ideas will improve   4/15/2019 2115 by Shakeel Castaneda LPN  Outcome: Ongoing  Note:   Patient denies suicidal ideas at this time. Patient is free of self harm at this time. Patient agrees to seek out staff if thoughts to harm self arise. Staff will provide support and reassurance as needed. Safety checks maintained every 15 minutes. Problem: Depressive Behavior With or Without Suicide Precautions:  Goal: Able to verbalize and/or display a decrease in depressive symptoms  Description  Able to verbalize and/or display a decrease in depressive symptoms   Outcome: Ongoing  Note:   Patient reports having low depressive symptoms at this time. Patient states that this is normal for him.

## 2019-04-16 NOTE — CARE COORDINATION
Name: Steph Giang    : 1970    Discharge Date: 19    Primary Auth/Cert #: [id number]    Discharged to a Shelter     Discharge Medications:      Medication List      START taking these medications    hydrOXYzine 25 MG tablet  Commonly known as:  ATARAX  Take 1 tablet by mouth 3 times daily as needed for Anxiety  Notes to patient:  For anxiety        CHANGE how you take these medications    * QUEtiapine 300 MG tablet  Commonly known as:  SEROQUEL  Take 1 tablet by mouth nightly  What changed:  Another medication with the same name was added. Make sure you understand how and when to take each. Notes to patient: To help clear thinking     * QUEtiapine 100 MG tablet  Commonly known as:  SEROQUEL  Take 1 tablet by mouth 2 times daily  What changed: You were already taking a medication with the same name, and this prescription was added. Make sure you understand how and when to take each. Notes to patient: To help clear thinking     traZODone 50 MG tablet  Commonly known as:  DESYREL  Take 1 tablet by mouth nightly as needed for Sleep  What changed:    · medication strength  · how much to take  · when to take this  · reasons to take this  Notes to patient:  For sleep         * This list has 2 medication(s) that are the same as other medications prescribed for you. Read the directions carefully, and ask your doctor or other care provider to review them with you.             CONTINUE taking these medications    escitalopram 20 MG tablet  Commonly known as:  LEXAPRO  Take 1 tablet by mouth daily  Notes to patient:  Antidepressant           Where to Get Your Medications      These medications were sent to [facility], [address] [place] [address]    Phone:  [phone]   · escitalopram 20 MG tablet  · hydrOXYzine 25 MG tablet  · QUEtiapine 100 MG tablet  · QUEtiapine 300 MG tablet  · traZODone 50 MG tablet Follow Up Appointment: Chilo Andre  [address] [address] [facility]  [phone]  [phone]  On 4/24/2019  @ 1 PM with Micah Brito CNP / Harper Richardportation 48 business hours ahead to schedule to/from at [address] [facility] to here  [address]  [phone]    taxi directly to [facility] via [employer]

## 2019-04-16 NOTE — PROGRESS NOTES
CLINICAL PHARMACY NOTE: MEDS TO [address] Select Patient?: No  Total # of Prescriptions Filled: 5   The following medications were delivered to the patient:  · Hydroxyzine  · escitalopram  · Quetiapine fumarate  · Quetiapine fumarate  · Trazodone  Total # of Interventions Completed: 0  Time Spent (min): 30    Additional Documentation:

## 2019-04-22 ENCOUNTER — HOSPITAL ENCOUNTER (EMERGENCY)
Age: 49
Discharge: HOME | End: 2019-04-22
Attending: EMERGENCY MEDICINE
Payer: COMMERCIAL

## 2019-04-22 VITALS
RESPIRATION RATE: 18 BRPM | TEMPERATURE: 97.7 F | SYSTOLIC BLOOD PRESSURE: 118 MMHG | OXYGEN SATURATION: 98 % | HEART RATE: 88 BPM | DIASTOLIC BLOOD PRESSURE: 76 MMHG

## 2019-04-22 DIAGNOSIS — K40.91 UNILATERAL RECURRENT INGUINAL HERNIA WITHOUT OBSTRUCTION OR GANGRENE: Primary | ICD-10-CM

## 2019-04-22 PROCEDURE — 99283 EMERGENCY DEPT VISIT LOW MDM: CPT

## 2019-04-22 ASSESSMENT — ENCOUNTER SYMPTOMS
SHORTNESS OF BREATH: 0
ABDOMINAL PAIN: 0

## 2019-04-22 NOTE — ED PROVIDER NOTES
[facility]  ED  Emergency Department Encounter  Emergency Medicine Resident     Patient Name: Duc Arzate  MRN: [id number]  YOB: 1970  Date of evaluation: 4/22/19  PCP:  . None (Inactive)    CHIEF COMPLAINT       Hernia    HISTORY OF PRESENT ILLNESS  (Location/Symptom, Timing/Onset, Context/Setting, Quality, Duration, Modifying Factors, Severity.)      Duc Arzate is a 47 y.o. male who presents with a chief complaint of right hernia pain. Patient was lifting heavy boxes earlier today, when he developed pain in his right hernia. Patient reports the hernia has are the spontaneously reduced, however his employers insisted that he come in for evaluation. Patient reports he preferred to just make an appointment to get it repaired as an outpatient. Patient had fevers, chills, shortness of breath, chest pain, abdominal pain. Patient reports he had a history of a left-sided inguinal hernia that he had surgically repaired approximately 30 years ago. Symptom is right hernia pain, onset earlier today, duration intermittent since onset, Berti is moderate, context is patient was lifting heavy boxes. PAST MEDICAL / SURGICAL / SOCIAL / FAMILY HISTORY      has a past medical history of Alcohol abuse, GI bleed, Hematemesis, Hypertension, Intractable vomiting, and Suicidal behavior. has a past surgical history that includes hernia repair and Upper gastrointestinal endoscopy (6-5-14).     Social History     Socioeconomic History    Marital status:      Spouse name: Not on file    Number of children: Not on file    Years of education: Not on file    Highest education level: Not on file   Occupational History    Not on file   Social Needs    Financial resource strain: Not on file    Food insecurity:     Worry: Not on file     Inability: Not on file    Transportation needs:     Medical: Not on file     Non-medical: Not on file   Tobacco Use    Smoking status: Never Smoker    Cardiovascular: Negative for chest pain. Gastrointestinal: Negative for abdominal pain. Positive for hernia       PHYSICAL EXAM   (up to 7 for level 4, 8 or more for level 5)      INITIAL VITALS:   /76   Pulse 88   Temp 97.7 °F (36.5 °C) (Oral)   Resp 18   SpO2 98%     Physical Exam   Constitutional: He is oriented to person, place, and time. He appears well-developed and well-nourished. No distress. HENT:   Head: Normocephalic and atraumatic. Eyes: Right eye exhibits no discharge. Left eye exhibits no discharge. Neck: No tracheal deviation present. Cardiovascular: Normal rate, regular rhythm and normal heart sounds. Pulmonary/Chest: Effort normal and breath sounds normal. No stridor. No respiratory distress. He has no wheezes. He has no rales. Abdominal: Soft. Bowel sounds are normal. He exhibits no distension and no mass. There is no tenderness. There is no rebound and no guarding. A hernia is present. Hernia confirmed positive in the right inguinal area (without overlying skin changes, easily reducible). Hernia confirmed negative in the left inguinal area. Musculoskeletal: Normal range of motion. He exhibits no deformity. Neurological: He is alert and oriented to person, place, and time. Skin: Skin is warm and dry. No rash noted. He is not diaphoretic. Psychiatric: He has a normal mood and affect. His speech is normal and behavior is normal.       WORK-UP     PLAN (LABS / IMAGING /EKG):  No orders of the defined types were placed in this encounter. MEDICATIONS ORDERED:  No orders of the defined types were placed in this encounter. DIAGNOSTIC RESULTS / EMERGENCY DEPARTMENT COURSE /MDM / DIFFERENTIAL DIAGNOSIS     LABS:  No results found for this visit on 04/22/19.     RADIOLOGY:  None    EKG  None    All EKG's are interpreted by the Emergency Department Physician who either signs or Co-signs this chart in the absence of a cardiologist.    DIFFERENTIAL

## 2019-04-22 NOTE — ED PROVIDER NOTES
[facility]     Emergency Department     Faculty Attestation    I performed a history and physical examination of the patient and discussed management with the resident. I reviewed the residents note and agree with the documented findings and plan of care. Any areas of disagreement are noted on the chart. I was personally present for the key portions of any procedures. I have documented in the chart those procedures where I was not present during the key portions. I have reviewed the emergency nurses triage note. I agree with the chief complaint, past medical history, past surgical history, allergies, medications, social and family history as documented unless otherwise noted below. Documentation of the HPI, Physical Exam and Medical Decision Making performed by medical students or scribes is based on my personal performance of the HPI, PE and MDM. For Physician Assistant/ Nurse Practitioner cases/documentation I have personally evaluated this patient and have completed at least one if not all key elements of the E/M (history, physical exam, and MDM). Additional findings are as noted. Vital signs:   Vitals:    04/22/19 1053   BP: 118/76   Pulse: 88   Resp: 18   Temp: 97.7 °F (36.5 °C)   SpO2: 98%      Right inguinal hernia, not incarcerated, but uncomfortable. No obstructive symptoms. Had a prior left inguinal hernia that was repaired, but this was 30 years ago. Requesting referral to a surgeon. Living at [facility] and has been doing a job with a lot of lifting recently. We will give him a \"no heavy lifting\" note and referral to surgery clinic.              Mark Acevedo M.D,  Attending Emergency  Physician           Homero Prieto MD  04/22/19 4468

## 2019-04-22 NOTE — ED NOTES
Pt states right sided inguinal hernia. Pt states Hx of them 30 years ago. Pt states still passing stool. No abd bloating. No abd tenderness.       Danny Mcdonald RN  04/22/19 1100

## 2019-04-24 NOTE — DISCHARGE SUMMARY
Patient ID:  Annamarie Gaitan  [id number]  23 y.o.  1970    Admit date: 4/10/2019    Discharge date and time: 4/16/19 in the afternoon    Admitting Physician: Roya Dceker MD     Discharge Physician: Maisha George MD    Admission Diagnoses: Major depressive disorder, recurrent ([facility] [address] [F33.9]  Severe major depression ([address] [F32.2]    Discharge Diagnoses:   Major depressive disorder, recurrent ([address]     Patient Active Problem List   Diagnosis Code    GI bleed K92.2    Alcohol abuse F10.10    Gastritis K29.70    Esophagitis K20.9    Hypokalemia E87.6    Intractable nausea and vomiting R11.2    Anxiety F41.9    Hypertension I10    Hematemesis K92.0    Depression F32.9    Severe major depression without psychotic features ([address] F32.2    Alcohol withdrawal syndrome without complication ([address] X07.014    History of esophagitis Z87.19    Fever R50.9    Major depressive disorder, recurrent ([address] F33.9    Severe major depression ([address] F32.2        Admission Condition: poor    Discharged Condition: stable    Indication for Admission: threat to self    History of Present Illnes (present tense wording indicates findings from admission exam on 4/10/2019 and are not necessarily indicative of current findings): Increased depression and suicidal ideations. Hospital Course:   Upon admission, Annamarie Gaitan was provided a safe secure environment, introduced to unit milieu. Patient participated in groups and individual therapies. After few days of hospital care, patient began to feel improvement. Depression lifted, thoughts to harm self ceased. Sleep improved, appetite was good. On morning rounds 4/24/2019, patient endorsed feeling ready for discharge. Patient denies suicidal or homicidal ideations, denies hallucinations or delusions. Denies SE's from meds. It was decided that pt had achieved maximum benefit from hospital care and can be discharged.     Consults:   None    Significant Diagnostic effects:  Denies any. Disposition: home    Patient Instructions: Activity: activity as tolerated    Follow-up as scheduled with psychiatric care within 1 week (see discharge papers)    Time Spent: 35 minutes    Engagement: Patient displayed a good level of engagement with the treatments offered during this admission.        Discharge planning, findings, and recommendations were discussed with and approved by Dr. Sherri Arambula MD    Signed:  Sherri Arambula   4/24/2019  6:15 PM

## 2019-05-01 ENCOUNTER — INITIAL CONSULT (OUTPATIENT)
Dept: SURGERY | Age: 49
End: 2019-05-01
Payer: COMMERCIAL

## 2019-05-01 VITALS
SYSTOLIC BLOOD PRESSURE: 124 MMHG | HEART RATE: 82 BPM | HEIGHT: 69 IN | BODY MASS INDEX: 20.62 KG/M2 | WEIGHT: 139.2 LBS | TEMPERATURE: 98.1 F | DIASTOLIC BLOOD PRESSURE: 80 MMHG

## 2019-05-01 DIAGNOSIS — K40.90 RIGHT INGUINAL HERNIA: Primary | ICD-10-CM

## 2019-05-01 PROCEDURE — G8420 CALC BMI NORM PARAMETERS: HCPCS | Performed by: STUDENT IN AN ORGANIZED HEALTH CARE EDUCATION/TRAINING PROGRAM

## 2019-05-01 PROCEDURE — 1111F DSCHRG MED/CURRENT MED MERGE: CPT | Performed by: STUDENT IN AN ORGANIZED HEALTH CARE EDUCATION/TRAINING PROGRAM

## 2019-05-01 PROCEDURE — G8427 DOCREV CUR MEDS BY ELIG CLIN: HCPCS | Performed by: STUDENT IN AN ORGANIZED HEALTH CARE EDUCATION/TRAINING PROGRAM

## 2019-05-01 PROCEDURE — 3017F COLORECTAL CA SCREEN DOC REV: CPT | Performed by: STUDENT IN AN ORGANIZED HEALTH CARE EDUCATION/TRAINING PROGRAM

## 2019-05-01 PROCEDURE — 1036F TOBACCO NON-USER: CPT | Performed by: STUDENT IN AN ORGANIZED HEALTH CARE EDUCATION/TRAINING PROGRAM

## 2019-05-01 PROCEDURE — 99203 OFFICE O/P NEW LOW 30 MIN: CPT | Performed by: STUDENT IN AN ORGANIZED HEALTH CARE EDUCATION/TRAINING PROGRAM

## 2019-05-01 NOTE — PATIENT INSTRUCTIONS
Thank you letting us take care of you today. We hope that all your questions were addressed. If a question was overlooked or something else comes to mind after you return home, please call our office at [phone]. If you need to cancel or change an appointment, surgery or procedure, please contact the office at [phone].

## 2019-05-01 NOTE — PROGRESS NOTES
History and Physical  [facility]    Patient's Name/Date of Birth: Jose Sheriff / 1970 (47 y.o.)    Date: May 1, 2019     HPI: Pt is a 47 y.o. male who presents to [facility] for a right inguinal hernia. The patient has h/o an open left hernia repair in the past and recognized that the same thing was happening on the right. Pt states he first noticed the right inguinal hernia years ago and it began to become really painful about a year ago. The pt states pain is a 2/10 and hurts more when he is standing or lifting heavy objects. Pt notes the pain is relieved by laying down. Pt states he has never noticed the bulge become rigid or erythematous and he has always been able to push it back in. He denies nausea, vomiting or abnormal bowel movements. Past Medical History:   Diagnosis Date    Alcohol abuse 6/5/2014    GI bleed 6/5/2014    Hematemesis 6/5/2014    Hypertension     Intractable vomiting 6/5/2014    Suicidal behavior 12/13/2018       Past Surgical History:   Procedure Laterality Date    HERNIA REPAIR      left    UPPER GASTROINTESTINAL ENDOSCOPY  6-5-14    gastritis, esophagitis, ulcers-koko       Current Outpatient Medications   Medication Sig Dispense Refill    escitalopram (LEXAPRO) 20 MG tablet Take 1 tablet by mouth daily 30 tablet 0    traZODone (DESYREL) 50 MG tablet Take 1 tablet by mouth nightly as needed for Sleep 30 tablet 0    QUEtiapine (SEROQUEL) 300 MG tablet Take 1 tablet by mouth nightly 30 tablet 0    QUEtiapine (SEROQUEL) 100 MG tablet Take 1 tablet by mouth 2 times daily 60 tablet 0     No current facility-administered medications for this visit.         Allergies   Allergen Reactions    Pcn [Penicillins] Rash       Family History   Adopted: Yes       Social History     Socioeconomic History    Marital status:      Spouse name: Not on file    Number of children: Not on file    Years of education: Not on file    Highest education level: Not on file   Occupational History    Not on file   Social Needs    Financial resource strain: Not on file    Food insecurity:     Worry: Not on file     Inability: Not on file    Transportation needs:     Medical: Not on file     Non-medical: Not on file   Tobacco Use    Smoking status: Never Smoker    Smokeless tobacco: Never Used    Tobacco comment: pt is a nonsmioker   Substance and Sexual Activity    Alcohol use: Yes     Alcohol/week: 10.0 oz     Types: 20 Standard drinks or equivalent per week     Comment: 1/5 liquor daily when he can get it.  Drug use: No    Sexual activity: Not on file   Lifestyle    Physical activity:     Days per week: Not on file     Minutes per session: Not on file    Stress: Not on file   Relationships    Social connections:     Talks on phone: Not on file     Gets together: Not on file     Attends Synagogue service: Not on file     Active member of club or organization: Not on file     Attends meetings of clubs or organizations: Not on file     Relationship status: Not on file    Intimate partner violence:     Fear of current or ex partner: Not on file     Emotionally abused: Not on file     Physically abused: Not on file     Forced sexual activity: Not on file   Other Topics Concern    Not on file   Social History Narrative    Not on file       ROS: General ROS: negative for - chills, fatigue or fever  Respiratory ROS: no cough, shortness of breath, or wheezing  Cardiovascular ROS: +HTN. no chest pain or dyspnea on exertion  Gastrointestinal ROS: no abdominal pain, change in bowel habits, or black or bloody stools. +h/o UGI bleed  Genito-Urinary ROS: no dysuria, trouble voiding, or hematuria  positive for - right inguinal buldge  Psych:  +h/o EtOH abuse, suicidal  11 systems reviewed. Negative other than those noted above.     Physical Exam:  Vitals:    05/01/19 0922   BP: 124/80   Pulse: 82   Temp: 98.1 °F (36.7 °C)     General:A & O x3  HEENT:  NCAT, PERRL, EMOI, oral mucus membrane pink and moist  Heart: RRR  Lungs: clear to auscultation without wheezes or rales  Abdomen: soft, nontender, ND  : Right inguinal bulge, reducible. Pain on palpation. Scar on left side from previous surgery,  Extremity: Normal, without deformities, edema, or skin discoloration  SKIN: Skin color, texture, turgor normal. No rashes or lesions. Neuro: CN II-XII grossly intact. No motor or sensory deficits appreciated. MK:normal throughout upper and lower extremities    Assessment     3 47year old male with right inguinal hernia    Plan     1. Robotic Laprascopic Right inguinal hernia repair- Educated about the risks and benefits of surgery and demonstrated understanding. 2. Planned procedure explained in detail including discussion of all associated risks/benefits. All questions/concerns were addressed and informed consent was obtained, witnessed by nurse, and placed on the pt's chart. 3. Pt to f/u in office post-op. Will discuss screening colonoscopy vs FIT testing at that time given pt age >48 yo. Seen w/ Dr. Hui Liu    Electronically signed by Don Heredia DO on 5/1/2019 at 2:44 PM    I have reviewed the resident's note and I either performed the key elements of the medical history and physical exam or was present with the resident when the key elements of the medical history and physical exam were performed. I have discussed the findings, established the care plan and recommendations with Resident.     Electronically signed by Viola Hills IV, DO on 5/15/19 at 9:16 AM

## 2019-05-15 RX ORDER — QUETIAPINE 150 MG/1
150 TABLET, FILM COATED, EXTENDED RELEASE ORAL DAILY
Status: ON HOLD | COMMUNITY
End: 2019-06-13

## 2019-05-16 ENCOUNTER — ANESTHESIA EVENT (OUTPATIENT)
Dept: OPERATING ROOM | Age: 49
End: 2019-05-16
Payer: COMMERCIAL

## 2019-05-16 ENCOUNTER — ANESTHESIA (OUTPATIENT)
Dept: OPERATING ROOM | Age: 49
End: 2019-05-16
Payer: COMMERCIAL

## 2019-05-16 ENCOUNTER — HOSPITAL ENCOUNTER (OUTPATIENT)
Age: 49
Setting detail: OUTPATIENT SURGERY
Discharge: NURSING, REHAB OR LONG-TERM CARE | End: 2019-05-16
Attending: SURGERY | Admitting: SURGERY
Payer: COMMERCIAL

## 2019-05-16 VITALS
HEART RATE: 82 BPM | RESPIRATION RATE: 16 BRPM | DIASTOLIC BLOOD PRESSURE: 81 MMHG | TEMPERATURE: 98.6 F | SYSTOLIC BLOOD PRESSURE: 123 MMHG | OXYGEN SATURATION: 97 %

## 2019-05-16 VITALS — DIASTOLIC BLOOD PRESSURE: 92 MMHG | TEMPERATURE: 98.1 F | SYSTOLIC BLOOD PRESSURE: 144 MMHG | OXYGEN SATURATION: 99 %

## 2019-05-16 DIAGNOSIS — G89.18 POST-OP PAIN: Primary | ICD-10-CM

## 2019-05-16 PROCEDURE — 3600000019 HC SURGERY ROBOT ADDTL 15MIN: Performed by: SURGERY

## 2019-05-16 PROCEDURE — 7100000000 HC PACU RECOVERY - FIRST 15 MIN: Performed by: SURGERY

## 2019-05-16 PROCEDURE — 6360000002 HC RX W HCPCS: Performed by: NURSE ANESTHETIST, CERTIFIED REGISTERED

## 2019-05-16 PROCEDURE — 3700000001 HC ADD 15 MINUTES (ANESTHESIA): Performed by: SURGERY

## 2019-05-16 PROCEDURE — 2580000003 HC RX 258: Performed by: NURSE ANESTHETIST, CERTIFIED REGISTERED

## 2019-05-16 PROCEDURE — C1760 CLOSURE DEV, VASC: HCPCS | Performed by: SURGERY

## 2019-05-16 PROCEDURE — 7100000001 HC PACU RECOVERY - ADDTL 15 MIN: Performed by: SURGERY

## 2019-05-16 PROCEDURE — 2500000003 HC RX 250 WO HCPCS: Performed by: NURSE ANESTHETIST, CERTIFIED REGISTERED

## 2019-05-16 PROCEDURE — 2500000003 HC RX 250 WO HCPCS: Performed by: SURGERY

## 2019-05-16 PROCEDURE — 6370000000 HC RX 637 (ALT 250 FOR IP): Performed by: ANESTHESIOLOGY

## 2019-05-16 PROCEDURE — 6370000000 HC RX 637 (ALT 250 FOR IP): Performed by: NURSE ANESTHETIST, CERTIFIED REGISTERED

## 2019-05-16 PROCEDURE — S2900 ROBOTIC SURGICAL SYSTEM: HCPCS | Performed by: SURGERY

## 2019-05-16 PROCEDURE — 7100000010 HC PHASE II RECOVERY - FIRST 15 MIN: Performed by: SURGERY

## 2019-05-16 PROCEDURE — 6360000002 HC RX W HCPCS: Performed by: ANESTHESIOLOGY

## 2019-05-16 PROCEDURE — C1781 MESH (IMPLANTABLE): HCPCS | Performed by: SURGERY

## 2019-05-16 PROCEDURE — 2709999900 HC NON-CHARGEABLE SUPPLY: Performed by: SURGERY

## 2019-05-16 PROCEDURE — 7100000011 HC PHASE II RECOVERY - ADDTL 15 MIN: Performed by: SURGERY

## 2019-05-16 PROCEDURE — 2580000003 HC RX 258: Performed by: SURGERY

## 2019-05-16 PROCEDURE — 3700000000 HC ANESTHESIA ATTENDED CARE: Performed by: SURGERY

## 2019-05-16 PROCEDURE — 3600000009 HC SURGERY ROBOT BASE: Performed by: SURGERY

## 2019-05-16 DEVICE — MESH SURG W3.5XL6IN POLY SELF FIXATING RECT W/ RESRB PLA: Type: IMPLANTABLE DEVICE | Site: GROIN | Status: FUNCTIONAL

## 2019-05-16 RX ORDER — HYDROCODONE BITARTRATE AND ACETAMINOPHEN 5; 325 MG/1; MG/1
1 TABLET ORAL EVERY 6 HOURS PRN
Qty: 28 TABLET | Refills: 0 | Status: SHIPPED | OUTPATIENT
Start: 2019-05-16 | End: 2019-05-23

## 2019-05-16 RX ORDER — FENTANYL CITRATE 50 UG/ML
25 INJECTION, SOLUTION INTRAMUSCULAR; INTRAVENOUS EVERY 5 MIN PRN
Status: DISCONTINUED | OUTPATIENT
Start: 2019-05-16 | End: 2019-05-16 | Stop reason: HOSPADM

## 2019-05-16 RX ORDER — CLINDAMYCIN PHOSPHATE 900 MG/50ML
900 INJECTION INTRAVENOUS ONCE
Status: COMPLETED | OUTPATIENT
Start: 2019-05-16 | End: 2019-05-16

## 2019-05-16 RX ORDER — LIDOCAINE HYDROCHLORIDE 20 MG/ML
JELLY TOPICAL PRN
Status: DISCONTINUED | OUTPATIENT
Start: 2019-05-16 | End: 2019-05-16 | Stop reason: SDUPTHER

## 2019-05-16 RX ORDER — GLYCOPYRROLATE 1 MG/5 ML
SYRINGE (ML) INTRAVENOUS PRN
Status: DISCONTINUED | OUTPATIENT
Start: 2019-05-16 | End: 2019-05-16 | Stop reason: SDUPTHER

## 2019-05-16 RX ORDER — LIDOCAINE HYDROCHLORIDE 10 MG/ML
INJECTION, SOLUTION EPIDURAL; INFILTRATION; INTRACAUDAL; PERINEURAL PRN
Status: DISCONTINUED | OUTPATIENT
Start: 2019-05-16 | End: 2019-05-16 | Stop reason: SDUPTHER

## 2019-05-16 RX ORDER — DIPHENHYDRAMINE HYDROCHLORIDE 50 MG/ML
12.5 INJECTION INTRAMUSCULAR; INTRAVENOUS
Status: DISCONTINUED | OUTPATIENT
Start: 2019-05-16 | End: 2019-05-16 | Stop reason: HOSPADM

## 2019-05-16 RX ORDER — DEXAMETHASONE SODIUM PHOSPHATE 10 MG/ML
INJECTION INTRAMUSCULAR; INTRAVENOUS PRN
Status: DISCONTINUED | OUTPATIENT
Start: 2019-05-16 | End: 2019-05-16 | Stop reason: SDUPTHER

## 2019-05-16 RX ORDER — ROCURONIUM BROMIDE 10 MG/ML
INJECTION, SOLUTION INTRAVENOUS PRN
Status: DISCONTINUED | OUTPATIENT
Start: 2019-05-16 | End: 2019-05-16 | Stop reason: SDUPTHER

## 2019-05-16 RX ORDER — FENTANYL CITRATE 50 UG/ML
50 INJECTION, SOLUTION INTRAMUSCULAR; INTRAVENOUS EVERY 5 MIN PRN
Status: DISCONTINUED | OUTPATIENT
Start: 2019-05-16 | End: 2019-05-16 | Stop reason: HOSPADM

## 2019-05-16 RX ORDER — HYDROCODONE BITARTRATE AND ACETAMINOPHEN 5; 325 MG/1; MG/1
1 TABLET ORAL PRN
Status: COMPLETED | OUTPATIENT
Start: 2019-05-16 | End: 2019-05-16

## 2019-05-16 RX ORDER — LABETALOL HYDROCHLORIDE 5 MG/ML
INJECTION, SOLUTION INTRAVENOUS PRN
Status: DISCONTINUED | OUTPATIENT
Start: 2019-05-16 | End: 2019-05-16 | Stop reason: SDUPTHER

## 2019-05-16 RX ORDER — FENTANYL CITRATE 50 UG/ML
INJECTION, SOLUTION INTRAMUSCULAR; INTRAVENOUS PRN
Status: DISCONTINUED | OUTPATIENT
Start: 2019-05-16 | End: 2019-05-16 | Stop reason: SDUPTHER

## 2019-05-16 RX ORDER — DIPHENHYDRAMINE HYDROCHLORIDE 50 MG/ML
INJECTION INTRAMUSCULAR; INTRAVENOUS PRN
Status: DISCONTINUED | OUTPATIENT
Start: 2019-05-16 | End: 2019-05-16 | Stop reason: SDUPTHER

## 2019-05-16 RX ORDER — MAGNESIUM HYDROXIDE 1200 MG/15ML
LIQUID ORAL CONTINUOUS PRN
Status: COMPLETED | OUTPATIENT
Start: 2019-05-16 | End: 2019-05-16

## 2019-05-16 RX ORDER — HYDROCODONE BITARTRATE AND ACETAMINOPHEN 5; 325 MG/1; MG/1
2 TABLET ORAL PRN
Status: COMPLETED | OUTPATIENT
Start: 2019-05-16 | End: 2019-05-16

## 2019-05-16 RX ORDER — SODIUM CHLORIDE, SODIUM LACTATE, POTASSIUM CHLORIDE, CALCIUM CHLORIDE 600; 310; 30; 20 MG/100ML; MG/100ML; MG/100ML; MG/100ML
INJECTION, SOLUTION INTRAVENOUS CONTINUOUS PRN
Status: DISCONTINUED | OUTPATIENT
Start: 2019-05-16 | End: 2019-05-16 | Stop reason: SDUPTHER

## 2019-05-16 RX ORDER — BUPIVACAINE HYDROCHLORIDE 2.5 MG/ML
INJECTION, SOLUTION INFILTRATION; PERINEURAL PRN
Status: DISCONTINUED | OUTPATIENT
Start: 2019-05-16 | End: 2019-05-16 | Stop reason: ALTCHOICE

## 2019-05-16 RX ORDER — ALBUTEROL SULFATE 90 UG/1
AEROSOL, METERED RESPIRATORY (INHALATION) PRN
Status: DISCONTINUED | OUTPATIENT
Start: 2019-05-16 | End: 2019-05-16 | Stop reason: SDUPTHER

## 2019-05-16 RX ORDER — PROPOFOL 10 MG/ML
INJECTION, EMULSION INTRAVENOUS PRN
Status: DISCONTINUED | OUTPATIENT
Start: 2019-05-16 | End: 2019-05-16 | Stop reason: SDUPTHER

## 2019-05-16 RX ORDER — ONDANSETRON 4 MG/1
4 TABLET, FILM COATED ORAL EVERY 8 HOURS PRN
Qty: 30 TABLET | Refills: 0 | Status: ON HOLD | OUTPATIENT
Start: 2019-05-16 | End: 2019-06-16 | Stop reason: SDUPTHER

## 2019-05-16 RX ORDER — DOCUSATE SODIUM 100 MG/1
100 CAPSULE, LIQUID FILLED ORAL DAILY PRN
Qty: 30 CAPSULE | Refills: 0 | Status: ON HOLD | OUTPATIENT
Start: 2019-05-16 | End: 2019-06-16

## 2019-05-16 RX ORDER — ONDANSETRON 2 MG/ML
INJECTION INTRAMUSCULAR; INTRAVENOUS PRN
Status: DISCONTINUED | OUTPATIENT
Start: 2019-05-16 | End: 2019-05-16 | Stop reason: SDUPTHER

## 2019-05-16 RX ORDER — BUPIVACAINE HYDROCHLORIDE AND EPINEPHRINE 5; 5 MG/ML; UG/ML
INJECTION, SOLUTION EPIDURAL; INTRACAUDAL; PERINEURAL PRN
Status: DISCONTINUED | OUTPATIENT
Start: 2019-05-16 | End: 2019-05-16 | Stop reason: ALTCHOICE

## 2019-05-16 RX ORDER — ONDANSETRON 2 MG/ML
4 INJECTION INTRAMUSCULAR; INTRAVENOUS
Status: COMPLETED | OUTPATIENT
Start: 2019-05-16 | End: 2019-05-16

## 2019-05-16 RX ORDER — NEOSTIGMINE METHYLSULFATE 5 MG/5 ML
SYRINGE (ML) INTRAVENOUS PRN
Status: DISCONTINUED | OUTPATIENT
Start: 2019-05-16 | End: 2019-05-16 | Stop reason: SDUPTHER

## 2019-05-16 RX ADMIN — LIDOCAINE HYDROCHLORIDE 50 MG: 10 INJECTION, SOLUTION EPIDURAL; INFILTRATION; INTRACAUDAL; PERINEURAL at 13:37

## 2019-05-16 RX ADMIN — CLINDAMYCIN PHOSPHATE 900 MG: 900 INJECTION, SOLUTION INTRAVENOUS at 13:45

## 2019-05-16 RX ADMIN — DEXAMETHASONE SODIUM PHOSPHATE 10 MG: 10 INJECTION INTRAMUSCULAR; INTRAVENOUS at 14:01

## 2019-05-16 RX ADMIN — Medication 5 MG: at 13:43

## 2019-05-16 RX ADMIN — ONDANSETRON 4 MG: 2 INJECTION INTRAMUSCULAR; INTRAVENOUS at 16:30

## 2019-05-16 RX ADMIN — Medication 5 MG: at 14:08

## 2019-05-16 RX ADMIN — SODIUM CHLORIDE, POTASSIUM CHLORIDE, SODIUM LACTATE AND CALCIUM CHLORIDE: 600; 310; 30; 20 INJECTION, SOLUTION INTRAVENOUS at 14:15

## 2019-05-16 RX ADMIN — Medication 0.4 MG: at 15:52

## 2019-05-16 RX ADMIN — ROCURONIUM BROMIDE 20 MG: 10 INJECTION INTRAVENOUS at 15:11

## 2019-05-16 RX ADMIN — ROCURONIUM BROMIDE 50 MG: 10 INJECTION INTRAVENOUS at 13:37

## 2019-05-16 RX ADMIN — Medication 2 MG: at 15:54

## 2019-05-16 RX ADMIN — FENTANYL CITRATE 50 MCG: 50 INJECTION INTRAMUSCULAR; INTRAVENOUS at 13:32

## 2019-05-16 RX ADMIN — HYDROCODONE BITARTRATE AND ACETAMINOPHEN 2 TABLET: 5; 325 TABLET ORAL at 16:53

## 2019-05-16 RX ADMIN — SODIUM CHLORIDE, POTASSIUM CHLORIDE, SODIUM LACTATE AND CALCIUM CHLORIDE: 600; 310; 30; 20 INJECTION, SOLUTION INTRAVENOUS at 11:45

## 2019-05-16 RX ADMIN — Medication 5 MG: at 14:02

## 2019-05-16 RX ADMIN — ALBUTEROL SULFATE 5 PUFF: 90 AEROSOL, METERED RESPIRATORY (INHALATION) at 13:45

## 2019-05-16 RX ADMIN — FENTANYL CITRATE 50 MCG: 50 INJECTION, SOLUTION INTRAMUSCULAR; INTRAVENOUS at 16:31

## 2019-05-16 RX ADMIN — PROPOFOL 200 MG: 10 INJECTION, EMULSION INTRAVENOUS at 13:37

## 2019-05-16 RX ADMIN — FENTANYL CITRATE 50 MCG: 50 INJECTION, SOLUTION INTRAMUSCULAR; INTRAVENOUS at 16:33

## 2019-05-16 RX ADMIN — ONDANSETRON 4 MG: 2 INJECTION, SOLUTION INTRAMUSCULAR; INTRAVENOUS at 15:47

## 2019-05-16 RX ADMIN — LIDOCAINE HYDROCHLORIDE 3 ML: 20 JELLY TOPICAL at 13:41

## 2019-05-16 RX ADMIN — FENTANYL CITRATE 50 MCG: 50 INJECTION INTRAMUSCULAR; INTRAVENOUS at 13:34

## 2019-05-16 RX ADMIN — DIPHENHYDRAMINE HYDROCHLORIDE 25 MG: 50 INJECTION, SOLUTION INTRAMUSCULAR; INTRAVENOUS at 13:41

## 2019-05-16 ASSESSMENT — PULMONARY FUNCTION TESTS
PIF_VALUE: 21
PIF_VALUE: 19
PIF_VALUE: 26
PIF_VALUE: 22
PIF_VALUE: 22
PIF_VALUE: 0
PIF_VALUE: 24
PIF_VALUE: 23
PIF_VALUE: 23
PIF_VALUE: 21
PIF_VALUE: 1
PIF_VALUE: 23
PIF_VALUE: 24
PIF_VALUE: 27
PIF_VALUE: 23
PIF_VALUE: 18
PIF_VALUE: 23
PIF_VALUE: 18
PIF_VALUE: 26
PIF_VALUE: 26
PIF_VALUE: 12
PIF_VALUE: 26
PIF_VALUE: 22
PIF_VALUE: 24
PIF_VALUE: 23
PIF_VALUE: 21
PIF_VALUE: 25
PIF_VALUE: 12
PIF_VALUE: 0
PIF_VALUE: 26
PIF_VALUE: 23
PIF_VALUE: 23
PIF_VALUE: 3
PIF_VALUE: 22
PIF_VALUE: 27
PIF_VALUE: 23
PIF_VALUE: 24
PIF_VALUE: 23
PIF_VALUE: 22
PIF_VALUE: 11
PIF_VALUE: 26
PIF_VALUE: 0
PIF_VALUE: 26
PIF_VALUE: 21
PIF_VALUE: 23
PIF_VALUE: 13
PIF_VALUE: 12
PIF_VALUE: 14
PIF_VALUE: 21
PIF_VALUE: 22
PIF_VALUE: 25
PIF_VALUE: 22
PIF_VALUE: 0
PIF_VALUE: 12
PIF_VALUE: 24
PIF_VALUE: 11
PIF_VALUE: 1
PIF_VALUE: 25
PIF_VALUE: 11
PIF_VALUE: 18
PIF_VALUE: 21
PIF_VALUE: 25
PIF_VALUE: 22
PIF_VALUE: 21
PIF_VALUE: 13
PIF_VALUE: 13
PIF_VALUE: 12
PIF_VALUE: 14
PIF_VALUE: 12
PIF_VALUE: 18
PIF_VALUE: 11
PIF_VALUE: 24
PIF_VALUE: 24
PIF_VALUE: 21
PIF_VALUE: 21
PIF_VALUE: 26
PIF_VALUE: 26
PIF_VALUE: 16
PIF_VALUE: 12
PIF_VALUE: 23
PIF_VALUE: 12
PIF_VALUE: 23
PIF_VALUE: 0
PIF_VALUE: 12
PIF_VALUE: 23
PIF_VALUE: 19
PIF_VALUE: 17
PIF_VALUE: 22
PIF_VALUE: 0
PIF_VALUE: 17
PIF_VALUE: 22
PIF_VALUE: 13
PIF_VALUE: 12
PIF_VALUE: 15
PIF_VALUE: 11
PIF_VALUE: 24
PIF_VALUE: 22
PIF_VALUE: 18
PIF_VALUE: 22
PIF_VALUE: 23
PIF_VALUE: 22
PIF_VALUE: 11
PIF_VALUE: 24
PIF_VALUE: 25
PIF_VALUE: 26
PIF_VALUE: 22
PIF_VALUE: 25
PIF_VALUE: 5
PIF_VALUE: 23
PIF_VALUE: 27
PIF_VALUE: 22
PIF_VALUE: 21
PIF_VALUE: 25
PIF_VALUE: 5
PIF_VALUE: 22
PIF_VALUE: 12
PIF_VALUE: 21
PIF_VALUE: 21
PIF_VALUE: 23
PIF_VALUE: 21
PIF_VALUE: 27
PIF_VALUE: 12
PIF_VALUE: 20
PIF_VALUE: 14
PIF_VALUE: 26
PIF_VALUE: 21
PIF_VALUE: 13
PIF_VALUE: 24
PIF_VALUE: 0
PIF_VALUE: 23
PIF_VALUE: 26
PIF_VALUE: 1
PIF_VALUE: 22
PIF_VALUE: 21
PIF_VALUE: 27
PIF_VALUE: 23
PIF_VALUE: 1
PIF_VALUE: 5
PIF_VALUE: 23
PIF_VALUE: 12
PIF_VALUE: 18
PIF_VALUE: 24
PIF_VALUE: 28
PIF_VALUE: 21
PIF_VALUE: 16
PIF_VALUE: 25
PIF_VALUE: 19
PIF_VALUE: 26
PIF_VALUE: 23
PIF_VALUE: 21
PIF_VALUE: 22
PIF_VALUE: 23
PIF_VALUE: 21
PIF_VALUE: 14
PIF_VALUE: 23
PIF_VALUE: 21

## 2019-05-16 ASSESSMENT — PAIN SCALES - GENERAL
PAINLEVEL_OUTOF10: 8
PAINLEVEL_OUTOF10: 4
PAINLEVEL_OUTOF10: 7
PAINLEVEL_OUTOF10: 3
PAINLEVEL_OUTOF10: 8
PAINLEVEL_OUTOF10: 7

## 2019-05-16 ASSESSMENT — PAIN DESCRIPTION - LOCATION
LOCATION: ABDOMEN
LOCATION: ABDOMEN

## 2019-05-16 ASSESSMENT — PAIN DESCRIPTION - FREQUENCY
FREQUENCY: CONTINUOUS
FREQUENCY: INTERMITTENT

## 2019-05-16 ASSESSMENT — PAIN DESCRIPTION - PAIN TYPE
TYPE: SURGICAL PAIN
TYPE: SURGICAL PAIN

## 2019-05-16 ASSESSMENT — PAIN DESCRIPTION - ORIENTATION
ORIENTATION: MID
ORIENTATION: MID

## 2019-05-16 ASSESSMENT — PAIN DESCRIPTION - DESCRIPTORS
DESCRIPTORS: CRAMPING
DESCRIPTORS: CRAMPING

## 2019-05-16 ASSESSMENT — PAIN DESCRIPTION - PROGRESSION: CLINICAL_PROGRESSION: GRADUALLY IMPROVING

## 2019-05-16 NOTE — ANESTHESIA POSTPROCEDURE EVALUATION
Department of Anesthesiology  Postprocedure Note    Patient: Heather Massey  MRN: [id number]  YOB: 1970  Date of evaluation: 5/16/2019  Time:  5:15 PM     Procedure Summary     Date:  05/16/19 Room / Location:  [address] OR    Anesthesia Start:  1330 Anesthesia Stop:  8456    Procedure:  XI ROBOTIC LAPAROSCOPIC  INGUINAL HERNIA REPAIR WITH MESH, POSSIBLE BILATERAL, POSSIBLE OPEN (Right Abdomen) Diagnosis:  (RIGHT INGUINAL HERNIA)    Surgeon:  Kaykay Mckenzie DO Responsible Provider:  Ruby Hebert MD    Anesthesia Type:  general ASA Status:  2          Anesthesia Type: general    Jovana Phase I: Jovana Score: 10    Jovana Phase II: Jovana Score: 10    Last vitals: Reviewed and per EMR flowsheets.        Anesthesia Post Evaluation    Patient location during evaluation: PACU  Patient participation: complete - patient participated  Level of consciousness: awake and alert  Pain score: 2  Airway patency: patent  Nausea & Vomiting: no nausea and no vomiting  Complications: no  Cardiovascular status: hemodynamically stable  Respiratory status: acceptable  Hydration status: euvolemic

## 2019-05-16 NOTE — BRIEF OP NOTE
Brief Postoperative Note  ______________________________________________________________    Patient: Vikash Ontiveros  YOB: 1970  MRN: [id number]  Date of Procedure: 5/16/2019    Pre-Op Diagnosis: RIGHT INGUINAL HERNIA    Post-Op Diagnosis: Right indirect inguinal hernia       Procedure(s):  Right laparoscopic robotic assisted inguinal hernia repair with progrip mesh    Anesthesia: General    Surgeon(s):  Tonie Fernandez IV, DO    Assistant: Ben Martinez DO PGY2    Estimated Blood Loss (mL): 89EB    Complications: None    Specimens:   none    Implants:  Implant Name Type Inv.  Item Serial No.  Lot No. LRB No. Used   IMPL MESH PARIETEX PROGRIP [id number] Mesh IMPL MESH PARIETEX PROGRIP [id number]  [facility] SURGICAL [id number] Right 1         Drains:   [REMOVED] Urethral Catheter Double-lumen;Non-latex 16 fr (Removed)       Findings: right direct inguinal hernia    Ben Martinez DO  Date: 5/16/2019  Time: 4:55 PM

## 2019-05-16 NOTE — ANESTHESIA PRE PROCEDURE
Department of Anesthesiology  Preprocedure Note       Name:  Alice Hansen   Age:  47 y.o.  :  1970                                          MRN:  [id number]         Date:  2019      Surgeon: Michael Jean Baptiste):  Orin Hills IV, DO    Procedure: XI ROBOTIC LAPAROSCOPIC  INGUINAL HERNIA REPAIR WITH MESH, POSSIBLE BILATERAL, POSSIBLE OPEN (Right )    Medications prior to admission:   Prior to Admission medications    Medication Sig Start Date End Date Taking? Authorizing Provider   QUEtiapine (SEROQUEL XR) 150 MG TB24 extended release tablet Take 150 mg by mouth daily   Yes Historical Provider, MD   escitalopram (LEXAPRO) 20 MG tablet Take 1 tablet by mouth daily 4/15/19  Yes Sierra Barcenas MD   traZODone (DESYREL) 50 MG tablet Take 1 tablet by mouth nightly as needed for Sleep 4/15/19  Yes Sierra Barcenas MD   QUEtiapine (SEROQUEL) 300 MG tablet Take 1 tablet by mouth nightly 4/15/19  Yes Sierra Barcenas MD       Current medications:    Current Facility-Administered Medications   Medication Dose Route Frequency Provider Last Rate Last Dose    clindamycin (CLEOCIN) 900 mg in dextrose 5 % 50 mL IVPB  900 mg Intravenous Once [employer] IV, DO           Allergies:     Allergies   Allergen Reactions    Pcn [Penicillins] Rash       Problem List:    Patient Active Problem List   Diagnosis Code    GI bleed K92.2    Alcohol abuse F10.10    Gastritis K29.70    Esophagitis K20.9    Hypokalemia E87.6    Intractable nausea and vomiting R11.2    Anxiety F41.9    Hypertension I10    Hematemesis K92.0    Depression F32.9    Severe major depression without psychotic features ([address] F32.2    Alcohol withdrawal syndrome without complication ([address] B14.956    History of esophagitis Z87.19    Fever R50.9    Major depressive disorder, recurrent ([address] F33.9    Severe major depression ([address] F32.2       Past Medical History:        Diagnosis Date    Alcohol abuse 2014    GI bleed 2014   

## 2019-05-16 NOTE — H&P
Pre-Op H&P Update:    Pt has been seen and evaluated by me. Previous H&P from 5/1/19 visit with Dr. Rohini Hughes at [facility] reviewed with patient. There are no changes to his medical history or examination. Consent form signed and reviewed with patient. He has no additional questions or concerns at this time. Operative site examined by me and marked w/ pen. Plan to proceed with procedure as scheduled for today. Electronically signed by Hina Ariza DO on 5/16/2019 at 12:27 PM        History and Physical  [facility]     Patient's Name/Date of Birth: Virgilio Bond / 1970 (47 y.o.)     Date: May 1, 2019      HPI: Pt is a 47 y.o. male who presents to [facility] for a right inguinal hernia.      The patient has h/o an open left hernia repair in the past and recognized that the same thing was happening on the right. Pt states he first noticed the right inguinal hernia years ago and it began to become really painful about a year ago. The pt states pain is a 2/10 and hurts more when he is standing or lifting heavy objects. Pt notes the pain is relieved by laying down. Pt states he has never noticed the bulge become rigid or erythematous and he has always been able to push it back in.  He denies nausea, vomiting or abnormal bowel movements.        Past Medical History        Past Medical History:   Diagnosis Date    Alcohol abuse 6/5/2014    GI bleed 6/5/2014    Hematemesis 6/5/2014    Hypertension      Intractable vomiting 6/5/2014    Suicidal behavior 12/13/2018            Past Surgical History         Past Surgical History:   Procedure Laterality Date    HERNIA REPAIR         left    UPPER GASTROINTESTINAL ENDOSCOPY   6-5-14     gastritis, esophagitis, ulcers-[facility]            Current Facility-Administered Medications          Current Outpatient Medications   Medication Sig Dispense Refill    escitalopram (LEXAPRO) 20 MG tablet Take 1 tablet by mouth daily 30 tablet 0    traZODone (DESYREL) 50 MG tablet Take 1 tablet by mouth nightly as needed for Sleep 30 tablet 0    QUEtiapine (SEROQUEL) 300 MG tablet Take 1 tablet by mouth nightly 30 tablet 0    QUEtiapine (SEROQUEL) 100 MG tablet Take 1 tablet by mouth 2 times daily 60 tablet 0      No current facility-administered medications for this visit.             Allergies   Allergen Reactions    Pcn [Penicillins] Rash         Family History   Family History   Adopted: Yes            Social History               Socioeconomic History    Marital status:        Spouse name: Not on file    Number of children: Not on file    Years of education: Not on file    Highest education level: Not on file   Occupational History    Not on file   Social Needs    Financial resource strain: Not on file    Food insecurity:       Worry: Not on file       Inability: Not on file    Transportation needs:       Medical: Not on file       Non-medical: Not on file   Tobacco Use    Smoking status: Never Smoker    Smokeless tobacco: Never Used    Tobacco comment: pt is a nonsmioker   Substance and Sexual Activity    Alcohol use: Yes       Alcohol/week: 10.0 oz       Types: 20 Standard drinks or equivalent per week       Comment: 1/5 liquor daily when he can get it.     Drug use: No    Sexual activity: Not on file   Lifestyle    Physical activity:       Days per week: Not on file       Minutes per session: Not on file    Stress: Not on file   Relationships    Social connections:       Talks on phone: Not on file       Gets together: Not on file       Attends Gnosticist service: Not on file       Active member of club or organization: Not on file       Attends meetings of clubs or organizations: Not on file       Relationship status: Not on file    Intimate partner violence:       Fear of current or ex partner: Not on file       Emotionally abused: Not on file       Physically abused: Not on file       Forced sexual

## 2019-05-19 NOTE — OP NOTE
Operative Note     Name:  No Casanova              Age:  47 y.o.  :  1970                                                   MRN:  [id number]                                                                          Date:  2019        Pre-Operative Diagnosis: right inguinal hernia     Post-Operative Diagnosis: right indirect inguinal hernia    Surgeon: Dr. Dave Hurtado DO     Assistant: Jean Gant D.O., PGY II    Procedure: Right laparoscopic robotic assisted inguinal hernia repair with Covidien Progrip mesh     Anesthesia: general    Wound Class: I    Drains: None. Complications: None. Counts: Correct. Specimens: None     Surgical Fluid Loss: EBL: 10 cc     Findings: right indirect inguinal hernia    Description of procedure:  Patient taken to operative suite. Placed in supine position. General anesthesia with endotracheal tube was induced. Preoperative antibiotics were given in accordance with SCIP protocol. SCDs placed for DVT prophylaxis. A Herlinda hugger was placed to keep patient euthermic. Abdomen and groin region was shaved with clippers and prepped and draped in usual sterile fashion. An appropriate time out was performed prior to skin incision. Verese needle was placed in the left upper quadrant at Chery's point. A saline drop test was used to confirm entrance into the abdomen. Pneumoperitoneum was created with insufflation of carbon dioxide to 15 mmHg. An 8 mmHg Airseal port was placed in the left upper quadrant using optiview. Remaining ports ports under direct visualization. An 8 mm camera port was placed supraumbilical.  The two other 8mm ports were placed 10cm right and left lateral to the umbilical camera port. Robot was docked without complication. A 0 degree scope was placed into the abdomen. Both inguinal regions were inspected and the median umbilical ligament, medial umbilical ligaments, and lateral umbilical folds were identified.   A right indirect hernia was

## 2019-05-28 ENCOUNTER — HOSPITAL ENCOUNTER (INPATIENT)
Age: 49
LOS: 2 days | Discharge: LEFT BEFORE DISCHARGE | DRG: 770 | End: 2019-05-30
Attending: EMERGENCY MEDICINE | Admitting: INTERNAL MEDICINE
Payer: COMMERCIAL

## 2019-05-28 DIAGNOSIS — F10.930 ALCOHOL WITHDRAWAL SYNDROME WITHOUT COMPLICATION (HCC): ICD-10-CM

## 2019-05-28 DIAGNOSIS — K92.0 COFFEE GROUND EMESIS: Primary | ICD-10-CM

## 2019-05-28 PROBLEM — F10.939 ALCOHOL WITHDRAWAL, WITH UNSPECIFIED COMPLICATION (HCC): Status: ACTIVE | Noted: 2019-05-28

## 2019-05-28 LAB
-: NORMAL
ABSOLUTE EOS #: 0.24 K/UL (ref 0–0.44)
ABSOLUTE IMMATURE GRANULOCYTE: 0.03 K/UL (ref 0–0.3)
ABSOLUTE LYMPH #: 1.53 K/UL (ref 1.1–3.7)
ABSOLUTE MONO #: 0.88 K/UL (ref 0.1–1.2)
ALBUMIN SERPL-MCNC: 4.6 G/DL (ref 3.5–5.2)
ALBUMIN/GLOBULIN RATIO: 1.4 (ref 1–2.5)
ALP BLD-CCNC: 67 U/L (ref 40–129)
ALT SERPL-CCNC: 12 U/L (ref 5–41)
ANION GAP SERPL CALCULATED.3IONS-SCNC: 18 MMOL/L (ref 9–17)
AST SERPL-CCNC: 21 U/L
BASOPHILS # BLD: 1 % (ref 0–2)
BASOPHILS ABSOLUTE: 0.06 K/UL (ref 0–0.2)
BILIRUB SERPL-MCNC: 0.31 MG/DL (ref 0.3–1.2)
BUN BLDV-MCNC: 14 MG/DL (ref 6–20)
BUN/CREAT BLD: ABNORMAL (ref 9–20)
CALCIUM SERPL-MCNC: 9.2 MG/DL (ref 8.6–10.4)
CHLORIDE BLD-SCNC: 102 MMOL/L (ref 98–107)
CO2: 18 MMOL/L (ref 20–31)
CREAT SERPL-MCNC: 0.75 MG/DL (ref 0.7–1.2)
DIFFERENTIAL TYPE: ABNORMAL
EOSINOPHILS RELATIVE PERCENT: 3 % (ref 1–4)
GFR AFRICAN AMERICAN: >60 ML/MIN
GFR NON-AFRICAN AMERICAN: >60 ML/MIN
GFR SERPL CREATININE-BSD FRML MDRD: ABNORMAL ML/MIN/{1.73_M2}
GFR SERPL CREATININE-BSD FRML MDRD: ABNORMAL ML/MIN/{1.73_M2}
GLUCOSE BLD-MCNC: 79 MG/DL (ref 70–99)
HCT VFR BLD CALC: 42.1 % (ref 40.7–50.3)
HEMOGLOBIN: 14.1 G/DL (ref 13–17)
IMMATURE GRANULOCYTES: 0 %
INR BLD: 0.9
LIPASE: 21 U/L (ref 13–60)
LYMPHOCYTES # BLD: 17 % (ref 24–43)
MCH RBC QN AUTO: 32.2 PG (ref 25.2–33.5)
MCHC RBC AUTO-ENTMCNC: 33.5 G/DL (ref 28.4–34.8)
MCV RBC AUTO: 96.1 FL (ref 82.6–102.9)
MONOCYTES # BLD: 10 % (ref 3–12)
NRBC AUTOMATED: 0 PER 100 WBC
PARTIAL THROMBOPLASTIN TIME: 22 SEC (ref 20.5–30.5)
PDW BLD-RTO: 13 % (ref 11.8–14.4)
PLATELET # BLD: 354 K/UL (ref 138–453)
PLATELET ESTIMATE: ABNORMAL
PMV BLD AUTO: 10.1 FL (ref 8.1–13.5)
POTASSIUM SERPL-SCNC: 4.3 MMOL/L (ref 3.7–5.3)
PROTHROMBIN TIME: 10.1 SEC (ref 9–12)
RBC # BLD: 4.38 M/UL (ref 4.21–5.77)
RBC # BLD: ABNORMAL 10*6/UL
REASON FOR REJECTION: NORMAL
SEG NEUTROPHILS: 69 % (ref 36–65)
SEGMENTED NEUTROPHILS ABSOLUTE COUNT: 6.26 K/UL (ref 1.5–8.1)
SODIUM BLD-SCNC: 138 MMOL/L (ref 135–144)
TOTAL PROTEIN: 7.9 G/DL (ref 6.4–8.3)
WBC # BLD: 9 K/UL (ref 3.5–11.3)
WBC # BLD: ABNORMAL 10*3/UL
ZZ NTE CLEAN UP: ORDERED TEST: NORMAL
ZZ NTE WITH NAME CLEAN UP: SPECIMEN SOURCE: NORMAL

## 2019-05-28 PROCEDURE — 6360000002 HC RX W HCPCS: Performed by: NURSE PRACTITIONER

## 2019-05-28 PROCEDURE — 80053 COMPREHEN METABOLIC PANEL: CPT

## 2019-05-28 PROCEDURE — 85730 THROMBOPLASTIN TIME PARTIAL: CPT

## 2019-05-28 PROCEDURE — 99285 EMERGENCY DEPT VISIT HI MDM: CPT

## 2019-05-28 PROCEDURE — 83690 ASSAY OF LIPASE: CPT

## 2019-05-28 PROCEDURE — 2500000003 HC RX 250 WO HCPCS: Performed by: EMERGENCY MEDICINE

## 2019-05-28 PROCEDURE — 1200000000 HC SEMI PRIVATE

## 2019-05-28 PROCEDURE — 6370000000 HC RX 637 (ALT 250 FOR IP): Performed by: NURSE PRACTITIONER

## 2019-05-28 PROCEDURE — C9113 INJ PANTOPRAZOLE SODIUM, VIA: HCPCS | Performed by: NURSE PRACTITIONER

## 2019-05-28 PROCEDURE — 2580000003 HC RX 258: Performed by: NURSE PRACTITIONER

## 2019-05-28 PROCEDURE — 6370000000 HC RX 637 (ALT 250 FOR IP): Performed by: EMERGENCY MEDICINE

## 2019-05-28 PROCEDURE — 85025 COMPLETE CBC W/AUTO DIFF WBC: CPT

## 2019-05-28 PROCEDURE — 85610 PROTHROMBIN TIME: CPT

## 2019-05-28 PROCEDURE — 96374 THER/PROPH/DIAG INJ IV PUSH: CPT

## 2019-05-28 RX ORDER — TRAZODONE HYDROCHLORIDE 50 MG/1
50 TABLET ORAL NIGHTLY PRN
Status: DISCONTINUED | OUTPATIENT
Start: 2019-05-28 | End: 2019-05-30 | Stop reason: HOSPADM

## 2019-05-28 RX ORDER — LORAZEPAM 2 MG/ML
1 INJECTION INTRAMUSCULAR
Status: DISCONTINUED | OUTPATIENT
Start: 2019-05-28 | End: 2019-05-30 | Stop reason: HOSPADM

## 2019-05-28 RX ORDER — LORAZEPAM 1 MG/1
1 TABLET ORAL
Status: DISCONTINUED | OUTPATIENT
Start: 2019-05-28 | End: 2019-05-30 | Stop reason: HOSPADM

## 2019-05-28 RX ORDER — SODIUM CHLORIDE 9 MG/ML
INJECTION, SOLUTION INTRAVENOUS CONTINUOUS
Status: DISCONTINUED | OUTPATIENT
Start: 2019-05-28 | End: 2019-05-30 | Stop reason: HOSPADM

## 2019-05-28 RX ORDER — MAGNESIUM HYDROXIDE/ALUMINUM HYDROXICE/SIMETHICONE 120; 1200; 1200 MG/30ML; MG/30ML; MG/30ML
15 SUSPENSION ORAL ONCE
Status: COMPLETED | OUTPATIENT
Start: 2019-05-28 | End: 2019-05-28

## 2019-05-28 RX ORDER — ACETAMINOPHEN 325 MG/1
650 TABLET ORAL EVERY 4 HOURS PRN
Status: DISCONTINUED | OUTPATIENT
Start: 2019-05-28 | End: 2019-05-30 | Stop reason: HOSPADM

## 2019-05-28 RX ORDER — LORAZEPAM 2 MG/ML
3 INJECTION INTRAMUSCULAR
Status: DISCONTINUED | OUTPATIENT
Start: 2019-05-28 | End: 2019-05-30 | Stop reason: HOSPADM

## 2019-05-28 RX ORDER — LORAZEPAM 2 MG/1
2 TABLET ORAL
Status: DISCONTINUED | OUTPATIENT
Start: 2019-05-28 | End: 2019-05-30 | Stop reason: HOSPADM

## 2019-05-28 RX ORDER — MORPHINE SULFATE 4 MG/ML
4 INJECTION, SOLUTION INTRAMUSCULAR; INTRAVENOUS
Status: DISCONTINUED | OUTPATIENT
Start: 2019-05-28 | End: 2019-05-30 | Stop reason: HOSPADM

## 2019-05-28 RX ORDER — SODIUM CHLORIDE 0.9 % (FLUSH) 0.9 %
10 SYRINGE (ML) INJECTION EVERY 12 HOURS SCHEDULED
Status: DISCONTINUED | OUTPATIENT
Start: 2019-05-28 | End: 2019-05-30 | Stop reason: SDUPTHER

## 2019-05-28 RX ORDER — QUETIAPINE FUMARATE 300 MG/1
300 TABLET, FILM COATED ORAL NIGHTLY
Status: DISCONTINUED | OUTPATIENT
Start: 2019-05-28 | End: 2019-05-30 | Stop reason: HOSPADM

## 2019-05-28 RX ORDER — LORAZEPAM 2 MG/ML
2 INJECTION INTRAMUSCULAR
Status: DISCONTINUED | OUTPATIENT
Start: 2019-05-28 | End: 2019-05-30 | Stop reason: HOSPADM

## 2019-05-28 RX ORDER — MORPHINE SULFATE 2 MG/ML
2 INJECTION, SOLUTION INTRAMUSCULAR; INTRAVENOUS
Status: DISCONTINUED | OUTPATIENT
Start: 2019-05-28 | End: 2019-05-30 | Stop reason: HOSPADM

## 2019-05-28 RX ORDER — FOLIC ACID 1 MG/1
1 TABLET ORAL DAILY
Status: DISCONTINUED | OUTPATIENT
Start: 2019-05-29 | End: 2019-05-29

## 2019-05-28 RX ORDER — SODIUM CHLORIDE 0.9 % (FLUSH) 0.9 %
10 SYRINGE (ML) INJECTION PRN
Status: DISCONTINUED | OUTPATIENT
Start: 2019-05-28 | End: 2019-05-30 | Stop reason: HOSPADM

## 2019-05-28 RX ORDER — HYDROCODONE BITARTRATE AND ACETAMINOPHEN 5; 325 MG/1; MG/1
1 TABLET ORAL EVERY 4 HOURS PRN
Status: DISCONTINUED | OUTPATIENT
Start: 2019-05-28 | End: 2019-05-30 | Stop reason: HOSPADM

## 2019-05-28 RX ORDER — ONDANSETRON 2 MG/ML
4 INJECTION INTRAMUSCULAR; INTRAVENOUS EVERY 6 HOURS PRN
Status: DISCONTINUED | OUTPATIENT
Start: 2019-05-28 | End: 2019-05-30 | Stop reason: HOSPADM

## 2019-05-28 RX ORDER — HYDROCODONE BITARTRATE AND ACETAMINOPHEN 5; 325 MG/1; MG/1
2 TABLET ORAL EVERY 4 HOURS PRN
Status: DISCONTINUED | OUTPATIENT
Start: 2019-05-28 | End: 2019-05-30 | Stop reason: HOSPADM

## 2019-05-28 RX ORDER — LORAZEPAM 2 MG/1
4 TABLET ORAL
Status: DISCONTINUED | OUTPATIENT
Start: 2019-05-28 | End: 2019-05-30 | Stop reason: HOSPADM

## 2019-05-28 RX ORDER — LORAZEPAM 2 MG/ML
4 INJECTION INTRAMUSCULAR
Status: DISCONTINUED | OUTPATIENT
Start: 2019-05-28 | End: 2019-05-30 | Stop reason: HOSPADM

## 2019-05-28 RX ORDER — ESCITALOPRAM OXALATE 10 MG/1
20 TABLET ORAL DAILY
Status: DISCONTINUED | OUTPATIENT
Start: 2019-05-29 | End: 2019-05-30 | Stop reason: HOSPADM

## 2019-05-28 RX ORDER — MULTIVITAMIN WITH FOLIC ACID 400 MCG
1 TABLET ORAL DAILY
Status: DISCONTINUED | OUTPATIENT
Start: 2019-05-29 | End: 2019-05-30 | Stop reason: HOSPADM

## 2019-05-28 RX ORDER — THIAMINE MONONITRATE (VIT B1) 100 MG
100 TABLET ORAL DAILY
Status: DISCONTINUED | OUTPATIENT
Start: 2019-05-29 | End: 2019-05-29

## 2019-05-28 RX ORDER — DOCUSATE SODIUM 100 MG/1
100 CAPSULE, LIQUID FILLED ORAL DAILY PRN
Status: DISCONTINUED | OUTPATIENT
Start: 2019-05-28 | End: 2019-05-30 | Stop reason: HOSPADM

## 2019-05-28 RX ORDER — LORAZEPAM 0.5 MG/1
1 TABLET ORAL ONCE
Status: COMPLETED | OUTPATIENT
Start: 2019-05-28 | End: 2019-05-28

## 2019-05-28 RX ORDER — QUETIAPINE FUMARATE 50 MG/1
150 TABLET, EXTENDED RELEASE ORAL DAILY
Status: DISCONTINUED | OUTPATIENT
Start: 2019-05-29 | End: 2019-05-30 | Stop reason: HOSPADM

## 2019-05-28 RX ORDER — SODIUM CHLORIDE 0.9 % (FLUSH) 0.9 %
10 SYRINGE (ML) INJECTION EVERY 12 HOURS SCHEDULED
Status: DISCONTINUED | OUTPATIENT
Start: 2019-05-28 | End: 2019-05-30 | Stop reason: HOSPADM

## 2019-05-28 RX ADMIN — ALUMINUM HYDROXIDE, MAGNESIUM HYDROXIDE, AND SIMETHICONE 15 ML: 200; 200; 20 SUSPENSION ORAL at 20:30

## 2019-05-28 RX ADMIN — QUETIAPINE FUMARATE 300 MG: 300 TABLET ORAL at 23:47

## 2019-05-28 RX ADMIN — Medication 10 ML: at 23:07

## 2019-05-28 RX ADMIN — LORAZEPAM 1 MG: 0.5 TABLET ORAL at 21:01

## 2019-05-28 RX ADMIN — SODIUM CHLORIDE: 9 INJECTION, SOLUTION INTRAVENOUS at 23:05

## 2019-05-28 RX ADMIN — FAMOTIDINE 20 MG: 10 INJECTION, SOLUTION INTRAVENOUS at 20:31

## 2019-05-28 RX ADMIN — SODIUM CHLORIDE, PRESERVATIVE FREE 10 ML: 5 INJECTION INTRAVENOUS at 23:07

## 2019-05-28 RX ADMIN — SODIUM CHLORIDE 8 MG/HR: 9 INJECTION, SOLUTION INTRAVENOUS at 23:47

## 2019-05-28 ASSESSMENT — ENCOUNTER SYMPTOMS
COLOR CHANGE: 0
SHORTNESS OF BREATH: 0
VOMITING: 1
COUGH: 0
EYE PAIN: 0
RHINORRHEA: 0
SORE THROAT: 0
NAUSEA: 1
ABDOMINAL PAIN: 0

## 2019-05-28 ASSESSMENT — PAIN SCALES - GENERAL: PAINLEVEL_OUTOF10: 3

## 2019-05-28 ASSESSMENT — PAIN DESCRIPTION - ORIENTATION: ORIENTATION: MID

## 2019-05-28 ASSESSMENT — PAIN DESCRIPTION - PAIN TYPE: TYPE: SURGICAL PAIN

## 2019-05-28 ASSESSMENT — PAIN DESCRIPTION - LOCATION: LOCATION: ABDOMEN

## 2019-05-28 ASSESSMENT — PAIN DESCRIPTION - DESCRIPTORS: DESCRIPTORS: DISCOMFORT

## 2019-05-28 ASSESSMENT — PAIN DESCRIPTION - FREQUENCY: FREQUENCY: INTERMITTENT

## 2019-05-29 ENCOUNTER — APPOINTMENT (OUTPATIENT)
Dept: ULTRASOUND IMAGING | Age: 49
DRG: 770 | End: 2019-05-29
Payer: COMMERCIAL

## 2019-05-29 PROBLEM — F10.930 ALCOHOL WITHDRAWAL SYNDROME WITHOUT COMPLICATION (HCC): Status: ACTIVE | Noted: 2019-05-28

## 2019-05-29 PROBLEM — K76.0 HEPATIC STEATOSIS: Status: ACTIVE | Noted: 2019-05-29

## 2019-05-29 PROBLEM — E87.29 METABOLIC ACIDOSIS, INCREASED ANION GAP: Status: ACTIVE | Noted: 2019-05-29

## 2019-05-29 LAB
AMPHETAMINE SCREEN URINE: POSITIVE
ANION GAP SERPL CALCULATED.3IONS-SCNC: 16 MMOL/L (ref 9–17)
BARBITURATE SCREEN URINE: NEGATIVE
BENZODIAZEPINE SCREEN, URINE: NEGATIVE
BUN BLDV-MCNC: 23 MG/DL (ref 6–20)
BUN/CREAT BLD: ABNORMAL (ref 9–20)
BUPRENORPHINE URINE: ABNORMAL
CALCIUM SERPL-MCNC: 8.2 MG/DL (ref 8.6–10.4)
CANNABINOID SCREEN URINE: NEGATIVE
CHLORIDE BLD-SCNC: 104 MMOL/L (ref 98–107)
CO2: 17 MMOL/L (ref 20–31)
COCAINE METABOLITE, URINE: NEGATIVE
CREAT SERPL-MCNC: 0.75 MG/DL (ref 0.7–1.2)
ETHANOL PERCENT: <0.01 %
ETHANOL: <10 MG/DL
FOLATE: >20 NG/ML
GFR AFRICAN AMERICAN: >60 ML/MIN
GFR NON-AFRICAN AMERICAN: >60 ML/MIN
GFR SERPL CREATININE-BSD FRML MDRD: ABNORMAL ML/MIN/{1.73_M2}
GFR SERPL CREATININE-BSD FRML MDRD: ABNORMAL ML/MIN/{1.73_M2}
GLUCOSE BLD-MCNC: 57 MG/DL (ref 70–99)
GLUCOSE BLD-MCNC: 82 MG/DL (ref 75–110)
HAV IGM SER IA-ACNC: NONREACTIVE
HCT VFR BLD CALC: 37.2 % (ref 40.7–50.3)
HEMOGLOBIN: 11.5 G/DL (ref 13–17)
HEMOGLOBIN: 11.7 G/DL (ref 13–17)
HEMOGLOBIN: 11.9 G/DL (ref 13–17)
HEMOGLOBIN: 12.4 G/DL (ref 13–17)
HEPATITIS B CORE IGM ANTIBODY: NONREACTIVE
HEPATITIS B SURFACE ANTIGEN: NONREACTIVE
HEPATITIS C ANTIBODY: NONREACTIVE
INR BLD: 0.9
MCH RBC QN AUTO: 31.2 PG (ref 25.2–33.5)
MCHC RBC AUTO-ENTMCNC: 32 G/DL (ref 28.4–34.8)
MCV RBC AUTO: 97.4 FL (ref 82.6–102.9)
MDMA URINE: ABNORMAL
METHADONE SCREEN, URINE: NEGATIVE
METHAMPHETAMINE, URINE: ABNORMAL
NRBC AUTOMATED: 0 PER 100 WBC
OPIATES, URINE: NEGATIVE
OXYCODONE SCREEN URINE: NEGATIVE
PDW BLD-RTO: 13.1 % (ref 11.8–14.4)
PHENCYCLIDINE, URINE: NEGATIVE
PLATELET # BLD: 246 K/UL (ref 138–453)
PMV BLD AUTO: 9.8 FL (ref 8.1–13.5)
POTASSIUM SERPL-SCNC: 4.5 MMOL/L (ref 3.7–5.3)
PROPOXYPHENE, URINE: ABNORMAL
PROTHROMBIN TIME: 9.9 SEC (ref 9–12)
RBC # BLD: 3.82 M/UL (ref 4.21–5.77)
SODIUM BLD-SCNC: 137 MMOL/L (ref 135–144)
TEST INFORMATION: ABNORMAL
TRICYCLIC ANTIDEPRESSANTS, UR: ABNORMAL
VITAMIN B-12: 363 PG/ML (ref 232–1245)
WBC # BLD: 7.1 K/UL (ref 3.5–11.3)

## 2019-05-29 PROCEDURE — 80048 BASIC METABOLIC PNL TOTAL CA: CPT

## 2019-05-29 PROCEDURE — 2580000003 HC RX 258: Performed by: NURSE PRACTITIONER

## 2019-05-29 PROCEDURE — 80074 ACUTE HEPATITIS PANEL: CPT

## 2019-05-29 PROCEDURE — 85027 COMPLETE CBC AUTOMATED: CPT

## 2019-05-29 PROCEDURE — 82607 VITAMIN B-12: CPT

## 2019-05-29 PROCEDURE — 6360000002 HC RX W HCPCS: Performed by: INTERNAL MEDICINE

## 2019-05-29 PROCEDURE — 82746 ASSAY OF FOLIC ACID SERUM: CPT

## 2019-05-29 PROCEDURE — 6370000000 HC RX 637 (ALT 250 FOR IP): Performed by: NURSE PRACTITIONER

## 2019-05-29 PROCEDURE — 85018 HEMOGLOBIN: CPT

## 2019-05-29 PROCEDURE — C9113 INJ PANTOPRAZOLE SODIUM, VIA: HCPCS | Performed by: NURSE PRACTITIONER

## 2019-05-29 PROCEDURE — 99253 IP/OBS CNSLTJ NEW/EST LOW 45: CPT | Performed by: INTERNAL MEDICINE

## 2019-05-29 PROCEDURE — 1200000000 HC SEMI PRIVATE

## 2019-05-29 PROCEDURE — G0480 DRUG TEST DEF 1-7 CLASSES: HCPCS

## 2019-05-29 PROCEDURE — 82947 ASSAY GLUCOSE BLOOD QUANT: CPT

## 2019-05-29 PROCEDURE — 2500000003 HC RX 250 WO HCPCS: Performed by: INTERNAL MEDICINE

## 2019-05-29 PROCEDURE — 99223 1ST HOSP IP/OBS HIGH 75: CPT | Performed by: INTERNAL MEDICINE

## 2019-05-29 PROCEDURE — 80307 DRUG TEST PRSMV CHEM ANLYZR: CPT

## 2019-05-29 PROCEDURE — 85610 PROTHROMBIN TIME: CPT

## 2019-05-29 PROCEDURE — 76705 ECHO EXAM OF ABDOMEN: CPT

## 2019-05-29 PROCEDURE — 6360000002 HC RX W HCPCS: Performed by: NURSE PRACTITIONER

## 2019-05-29 PROCEDURE — 36415 COLL VENOUS BLD VENIPUNCTURE: CPT

## 2019-05-29 PROCEDURE — 84425 ASSAY OF VITAMIN B-1: CPT

## 2019-05-29 PROCEDURE — 2580000003 HC RX 258: Performed by: INTERNAL MEDICINE

## 2019-05-29 RX ORDER — THIAMINE HYDROCHLORIDE 100 MG/ML
100 INJECTION, SOLUTION INTRAMUSCULAR; INTRAVENOUS DAILY
Status: DISCONTINUED | OUTPATIENT
Start: 2019-05-29 | End: 2019-05-29

## 2019-05-29 RX ADMIN — QUETIAPINE FUMARATE 300 MG: 300 TABLET ORAL at 22:07

## 2019-05-29 RX ADMIN — FOLIC ACID: 5 INJECTION, SOLUTION INTRAMUSCULAR; INTRAVENOUS; SUBCUTANEOUS at 11:10

## 2019-05-29 RX ADMIN — Medication 10 ML: at 08:59

## 2019-05-29 RX ADMIN — SODIUM CHLORIDE 8 MG/HR: 9 INJECTION, SOLUTION INTRAVENOUS at 11:10

## 2019-05-29 RX ADMIN — SODIUM CHLORIDE 8 MG/HR: 9 INJECTION, SOLUTION INTRAVENOUS at 18:56

## 2019-05-29 RX ADMIN — SODIUM CHLORIDE: 9 INJECTION, SOLUTION INTRAVENOUS at 14:03

## 2019-05-29 RX ADMIN — LORAZEPAM 1 MG: 2 INJECTION, SOLUTION INTRAMUSCULAR; INTRAVENOUS at 11:08

## 2019-05-29 ASSESSMENT — PAIN SCALES - WONG BAKER
WONGBAKER_NUMERICALRESPONSE: 0

## 2019-05-29 ASSESSMENT — PAIN SCALES - GENERAL
PAINLEVEL_OUTOF10: 0

## 2019-05-29 NOTE — CARE COORDINATION
Case Management Initial Discharge Plan  Alexandra Bates,             Met with:patient to discuss discharge plans. Information verified: address, contacts, phone number, , insurance Yes  PCP: . None (Inactive)  Date of last visit:   Insurance Provider:  babatunde    Discharge Planning    Living Arrangements:    with a friend  Support Systems:   son    Home has  stories   stairs to climb to get into front door, stairs to climb to reach second floor  Location of bedroom/bathroom in home    Patient able to perform ADL's:Independent    Current Services (outpatient & in home) none  DME equipment: none  DME provider:     Pharmacy: [facility] or [facility] in [address] [place] [address] Medications:     Does patient want to participate in local refill/ meds to beds program?       Potential Assistance Needed:       Patient agreeable to home care: No  [place] of choice provided:  n/a    Prior SNF/Rehab Placement and Facility:   Agreeable to SNF/Rehab: No  [place] of choice provided: n/a   Evaluation: yes    Expected Discharge date:     Patient expects to be discharged to: Follow Up Appointment: Best Day/ Time:      Transportation provider:  Usually walks  Transportation arrangements needed for discharge:  Not sure, will try to get ride    Readmission Risk              Risk of Unplanned Readmission:        30             Does patient have a readmission risk score greater than 14?: Yes  If yes, follow-up appointment must be made within 7 days of discharge. Discharge Plan: not sure if he will return to friends house or go to his son's house.   Will need pcp depending on where he will be living          Electronically signed by Perla Nieto RN on 19 at 12:40 PM

## 2019-05-29 NOTE — H&P
[facility]    HISTORY AND PHYSICAL EXAMINATION            Date:   5/29/2019  Patient name:  Solange Perdomo  Date of admission:  5/28/2019  7:47 PM  MRN:   [id number]  Account:  [de-identified]  YOB: 1970  PCP:    . None (Inactive)  Room:   [address] [place] [address]  Code Status:    Full Code    Chief Complaint:     Chief Complaint   Patient presents with    Hematemesis     PT IS AN ALCOHOLIC STATES HE QUIT BUT RELAPSED TODAY - SOON AFTER THREW UP BLOOD       History Obtained From:     patient    History of Present Illness: The patient is a 47 y.o. Non-/non  male who presents with Hematemesis (PT IS AN ALCOHOLIC STATES HE QUIT BUT RELAPSED TODAY - SOON AFTER THREW UP BLOOD)   and he is admitted to the hospital for the management of ETOH withdrawal. This is a 48 y/o white male that had been sober for about 45 days and started drinking again about 1 week ago. He has been drinking a fifth daily and presents with request for ETOH withdrawal treatment. 's had a recent surgery and has been living here in town away from family has had increased stressors in his life prompting him to relapsed drinking. Along with the symptoms he had 2 episodes of hematemesis, has a history of gastritis and esophagitis. He's had no further vomiting since admission denies any melena. He denies any other associated symptoms of abdominal pain, cramping, fevers or chills other complaints. Past Medical History:     Past Medical History:   Diagnosis Date    Alcohol abuse 6/5/2014    GI bleed 6/5/2014    Hematemesis 6/5/2014    Hypertension     Per patient BP only increases when he drinks alcohol.     Intractable vomiting 6/5/2014    Suicidal behavior 12/13/2018        Past Surgical History:     Past Surgical History:   Procedure Laterality Date    HERNIA REPAIR      left    HERNIA REPAIR Right 5/16/2019    XI ROBOTIC LAPAROSCOPIC  INGUINAL HERNIA REPAIR WITH MESH, POSSIBLE BILATERAL, POSSIBLE OPEN performed by Inna Hills IV, DO at [address]  05/2019    With Mesh    UPPER GASTROINTESTINAL ENDOSCOPY  6-5-14    gastritis, esophagitis, ulcers-koko        Medications Prior to Admission:     Prior to Admission medications    Medication Sig Start Date End Date Taking? Authorizing Provider   ondansetron (ZOFRAN) 4 MG tablet Take 1 tablet by mouth every 8 hours as needed for Nausea or Vomiting 5/16/19   Narciso Hamm DO   docusate sodium (COLACE) 100 MG capsule Take 1 capsule by mouth daily as needed for Constipation 5/16/19 6/15/19  Narciso Hamm DO   QUEtiapine (SEROQUEL XR) 150 MG TB24 extended release tablet Take 150 mg by mouth daily    Historical Provider, MD   escitalopram (LEXAPRO) 20 MG tablet Take 1 tablet by mouth daily 4/15/19   Isabela Martínez MD   traZODone (DESYREL) 50 MG tablet Take 1 tablet by mouth nightly as needed for Sleep 4/15/19   Isabela Martínez MD   QUEtiapine (SEROQUEL) 300 MG tablet Take 1 tablet by mouth nightly 4/15/19   Isabela Martínez MD        Allergies:     Pcn [penicillins]    Social History:     Tobacco:    reports that he has never smoked. He has never used smokeless tobacco.  Alcohol:      reports that he drank alcohol. Drug Use:  reports that he does not use drugs. Family History:     Family History   Adopted: Yes   Problem Relation Age of Onset    Asthma Mother     Crohn's Disease Mother     No Known Problems Father     Other Sister         Fibromyalgia    No Known Problems Brother     No Known Problems Maternal Grandmother     No Known Problems Maternal Grandfather     No Known Problems Paternal Grandmother     No Known Problems Paternal Grandfather     No Known Problems Daughter     Depression Daughter     No Known Problems Son        Review of Systems:     Positive and Negative as described in HPI.     CONSTITUTIONAL:  negative for fevers, chills, sweats, fatigue, weight loss  HEENT:  negative for vision, hearing changes, runny nose, throat pain  RESPIRATORY:  negative for shortness of breath, cough, congestion, wheezing. CARDIOVASCULAR:  negative for chest pain, palpitations  GASTROINTESTINAL:  negative for nausea, vomiting, diarrhea, constipation, change in bowel habits, abdominal pain, positive for hematemesis with history of gastritis/esophagitis   GENITOURINARY:  negative for difficulty of urination, burning with urination, frequency   INTEGUMENT:  negative for rash, skin lesions, easy bruising   HEMATOLOGIC/LYMPHATIC:  negative for swelling/edema   ALLERGIC/IMMUNOLOGIC:  negative for urticaria , itching  ENDOCRINE:  negative increase in drinking, increase in urination, hot or cold intolerance  MUSCULOSKELETAL:  negative joint pains, muscle aches, swelling of joints  NEUROLOGICAL:  negative for headaches, dizziness, lightheadedness, numbness, pain, tingling extremities  BEHAVIOR/PSYCH:  negative for depression, anxiety    Physical Exam:   /77   Pulse 82   Temp 97.5 °F (36.4 °C) (Oral)   Resp 13   Ht 5' 7\" (1.702 m)   Wt 150 lb (68 kg)   SpO2 99%   BMI 23.49 kg/m²   Temp (24hrs), Av.1 °F (36.7 °C), Min:97.5 °F (36.4 °C), Max:98.4 °F (36.9 °C)    Recent Labs     19  0744   POCGLU 82       Intake/Output Summary (Last 24 hours) at 2019 1439  Last data filed at 2019 2307  Gross per 24 hour   Intake 10 ml   Output --   Net 10 ml       General Appearance:  alert, well appearing, and in no acute distress  Mental status: oriented to person, place, and time with normal affect  Head:  normocephalic, atraumatic.   Eye: no icterus, redness, pupils equal and reactive, extraocular eye movements intact, conjunctiva clear  Ear: normal external ear, no discharge, hearing intact  Nose:  no drainage noted  Mouth: mucous membranes moist  Neck: supple, no carotid bruits, thyroid not palpable  Lungs: Bilateral equal air entry, clear to ausculation, no wheezing, rales or rhonchi, normal effort  Cardiovascular: normal rate, regular rhythm, no murmur, gallop, rub.   Abdomen: Soft, nontender, nondistended, normal bowel sounds, no hepatomegaly or splenomegaly  Neurologic: There are no new focal motor or sensory deficits, normal muscle tone and bulk, no abnormal sensation, normal speech, cranial nerves II through XII grossly intact, slight tremor, no asterixis  Skin: No gross lesions, rashes, bruising or bleeding on exposed skin area  Extremities:  peripheral pulses palpable, no pedal edema or calf pain with palpation  Psych: normal affect    Investigations:      Laboratory Testing:  Recent Results (from the past 24 hour(s))   CBC Auto Differential    Collection Time: 05/28/19  8:31 PM   Result Value Ref Range    WBC 9.0 3.5 - 11.3 k/uL    RBC 4.38 4.21 - 5.77 m/uL    Hemoglobin 14.1 13.0 - 17.0 g/dL    Hematocrit 42.1 40.7 - 50.3 %    MCV 96.1 82.6 - 102.9 fL    MCH 32.2 25.2 - 33.5 pg    MCHC 33.5 28.4 - 34.8 g/dL    RDW 13.0 11.8 - 14.4 %    Platelets 427 401 - 178 k/uL    MPV 10.1 8.1 - 13.5 fL    NRBC Automated 0.0 0.0 per 100 WBC    Differential Type NOT REPORTED     Seg Neutrophils 69 (H) 36 - 65 %    Lymphocytes 17 (L) 24 - 43 %    Monocytes 10 3 - 12 %    Eosinophils % 3 1 - 4 %    Basophils 1 0 - 2 %    Immature Granulocytes 0 0 %    Segs Absolute 6.26 1.50 - 8.10 k/uL    Absolute Lymph # 1.53 1.10 - 3.70 k/uL    Absolute Mono # 0.88 0.10 - 1.20 k/uL    Absolute Eos # 0.24 0.00 - 0.44 k/uL    Basophils # 0.06 0.00 - 0.20 k/uL    Absolute Immature Granulocyte 0.03 0.00 - 0.30 k/uL    WBC Morphology NOT REPORTED     RBC Morphology NOT REPORTED     Platelet Estimate NOT REPORTED    Comprehensive Metabolic Panel    Collection Time: 05/28/19  8:31 PM   Result Value Ref Range    Glucose 79 70 - 99 mg/dL    BUN 14 6 - 20 mg/dL    CREATININE 0.75 0.70 - 1.20 mg/dL    Bun/Cre Ratio NOT REPORTED 9 - 20    Calcium 9.2 8.6 - 10.4 mg/dL    Sodium 138 135 - 144 mmol/L    Potassium 4. 3 3.7 - 5.3 mmol/L    Chloride 102 98 - 107 mmol/L    CO2 18 (L) 20 - 31 mmol/L    Anion Gap 18 (H) 9 - 17 mmol/L    Alkaline Phosphatase 67 40 - 129 U/L    ALT 12 5 - 41 U/L    AST 21 <40 U/L    Total Bilirubin 0.31 0.3 - 1.2 mg/dL    Total Protein 7.9 6.4 - 8.3 g/dL    Alb 4.6 3.5 - 5.2 g/dL    Albumin/Globulin Ratio 1.4 1.0 - 2.5    GFR Non-African American >60 >60 mL/min    GFR African American >60 >60 mL/min    GFR Comment          GFR Staging NOT REPORTED    LIPASE    Collection Time: 05/28/19  8:31 PM   Result Value Ref Range    Lipase 21 13 - 60 U/L   SPECIMEN REJECTION    Collection Time: 05/28/19  8:31 PM   Result Value Ref Range    Specimen Source ER     Ordered Test PT,PTT     Reason for Rejection       Unable to perform testing: Specimen quantity not sufficient.    - NOT REPORTED    Protime-INR    Collection Time: 05/28/19  8:51 PM   Result Value Ref Range    Protime 10.1 9.0 - 12.0 sec    INR 0.9    APTT    Collection Time: 05/28/19  8:51 PM   Result Value Ref Range    PTT 22.0 20.5 - 30.5 sec   Hemoglobin    Collection Time: 05/29/19 12:14 AM   Result Value Ref Range    Hemoglobin 12.4 (L) 13.0 - 17.0 g/dL   Basic Metabolic Panel w/ Reflex to MG    Collection Time: 05/29/19  5:25 AM   Result Value Ref Range    Glucose 57 (L) 70 - 99 mg/dL    BUN 23 (H) 6 - 20 mg/dL    CREATININE 0.75 0.70 - 1.20 mg/dL    Bun/Cre Ratio NOT REPORTED 9 - 20    Calcium 8.2 (L) 8.6 - 10.4 mg/dL    Sodium 137 135 - 144 mmol/L    Potassium 4.5 3.7 - 5.3 mmol/L    Chloride 104 98 - 107 mmol/L    CO2 17 (L) 20 - 31 mmol/L    Anion Gap 16 9 - 17 mmol/L    GFR Non-African American >60 >60 mL/min    GFR African American >60 >60 mL/min    GFR Comment          GFR Staging NOT REPORTED    CBC    Collection Time: 05/29/19  5:25 AM   Result Value Ref Range    WBC 7.1 3.5 - 11.3 k/uL    RBC 3.82 (L) 4.21 - 5.77 m/uL    Hemoglobin 11.9 (L) 13.0 - 17.0 g/dL    Hematocrit 37.2 (L) 40.7 - 50.3 %    MCV 97.4 82.6 - 102.9 fL    MCH 31.2

## 2019-05-29 NOTE — CONSULTS
THE [facility] [place] [facility]  Consultation Note     . REASON FOR CONSULTATION:  Acute onset hematemesis      HISTORY OF PRESENT ILLNESS:         47-year-old chronic alcoholic with previous admissions for hematemesis due to esophagitis. Last amended on October 18 for similar problems when he had a couple of episodes of hematemesis after drank a lot of alcohol. EGD at that time showing grade B Esophagitis with portal gastropathy with no esophageal or gastric varices. Patient states that this time he had been clean for the last 2 months however started drinking again due to family stressors one week ago. Had 5 drinks of vodka day before yesterday and came in with 2 episodes of dark brown vomitus. Hemoglobin on admission was 14 however dropped to11.3 in the AM.  Patient however has not had any episodes of vomiting in-patient. PAST MEDICAL HISTORY:  Past Medical History:   Diagnosis Date    Alcohol abuse 6/5/2014    GI bleed 6/5/2014    Hematemesis 6/5/2014    Hypertension     Per patient BP only increases when he drinks alcohol.  Intractable vomiting 6/5/2014    Suicidal behavior 12/13/2018     Past Surgical History:   Procedure Laterality Date    HERNIA REPAIR      left    HERNIA REPAIR Right 5/16/2019    XI ROBOTIC LAPAROSCOPIC  INGUINAL HERNIA REPAIR WITH MESH, POSSIBLE BILATERAL, POSSIBLE OPEN performed by Nicole Hills IV, DO at [facility]  05/2019    With Mesh    UPPER GASTROINTESTINAL ENDOSCOPY  6-5-14    gastritis, esophagitis, ulcers-koko       ALLERGIES:  Allergies   Allergen Reactions    Pcn [Penicillins] Rash       HOME MEDICATIONS:  Prior to Admission medications    Medication Sig Start Date End Date Taking?  Authorizing Provider   ondansetron (ZOFRAN) 4 MG tablet Take 1 tablet by mouth every 8 hours as needed for Nausea or Vomiting 5/16/19   Jay Hankins DO   docusate sodium (COLACE) 100 MG capsule Take 1 capsule by mouth daily as needed for Constipation 5/16/19 6/15/19  Melissa Herrera,    QUEtiapine (SEROQUEL XR) 150 MG TB24 extended release tablet Take 150 mg by mouth daily    Historical Provider, MD   escitalopram (LEXAPRO) 20 MG tablet Take 1 tablet by mouth daily 4/15/19   Jaydon Sneed MD   traZODone (DESYREL) 50 MG tablet Take 1 tablet by mouth nightly as needed for Sleep 4/15/19   Jaydon Sneed MD   QUEtiapine (SEROQUEL) 300 MG tablet Take 1 tablet by mouth nightly 4/15/19   Jaydon Sneed MD     .  CURRENT MEDICATIONS:  Scheduled Meds:   thiamine and folic acid IVPB   Intravenous Daily    escitalopram  20 mg Oral Daily    QUEtiapine  150 mg Oral Daily    QUEtiapine  300 mg Oral Nightly    sodium chloride flush  10 mL Intravenous 2 times per day    sodium chloride flush  10 mL Intravenous 2 times per day    multivitamin  1 tablet Oral Daily     . Continuous Infusions:   sodium chloride 150 mL/hr at 05/29/19 0600    pantoprozole (PROTONIX) infusion 8 mg/hr (05/29/19 0600)     .   PRN Meds:docusate sodium, traZODone, sodium chloride flush, sodium chloride flush, acetaminophen, HYDROcodone 5 mg - acetaminophen **OR** HYDROcodone 5 mg - acetaminophen, morphine **OR** morphine, ondansetron, LORazepam **OR** LORazepam **OR** LORazepam **OR** LORazepam **OR** LORazepam **OR** LORazepam **OR** LORazepam **OR** LORazepam  .  SOCIAL HISTORY:     Social History     Socioeconomic History    Marital status:      Spouse name: Not on file    Number of children: Not on file    Years of education: Not on file    Highest education level: Not on file   Occupational History    Not on file   Social Needs    Financial resource strain: Not on file    Food insecurity:     Worry: Not on file     Inability: Not on file    Transportation needs:     Medical: Not on file     Non-medical: Not on file   Tobacco Use    Smoking status: Never Smoker    Smokeless tobacco: Never Used    Tobacco comment: pt is a nonsmioker   Substance and Sexual Activity    Alcohol use: Not Currently     Comment: Sober now 45days    Drug use: No    Sexual activity: Not Currently   Lifestyle    Physical activity:     Days per week: Not on file     Minutes per session: Not on file    Stress: Not on file   Relationships    Social connections:     Talks on phone: Not on file     Gets together: Not on file     Attends [employer]ian service: Not on file     Active member of club or organization: Not on file     Attends meetings of clubs or organizations: Not on file     Relationship status: Not on file    Intimate partner violence:     Fear of current or ex partner: Not on file     Emotionally abused: Not on file     Physically abused: Not on file     Forced sexual activity: Not on file   Other Topics Concern    Not on file   Social History Narrative    Not on file       FAMILY HISTORY:   Family History   Adopted: Yes   Problem Relation Age of Onset    Asthma Mother     Crohn's Disease Mother     No Known Problems Father     Other Sister         Fibromyalgia    No Known Problems Brother     No Known Problems Maternal Grandmother     No Known Problems Maternal Grandfather     No Known Problems Paternal Grandmother     No Known Problems Paternal Grandfather     No Known Problems Daughter     Depression Daughter     No Known Problems Son        REVIEW OF SYSTEMS:     Constitutional: No fever, no chills, no lethargy, no weakness, no weight loss  HEENT:  No headache, otalgia, itchy eyes, nasal discharge or sore throat. Cardiac:  No chest pain, dyspnea, orthopnea or PND. Chest:   No cough, phlegm or wheezing. Abdomen:  No abdominal pain, nausea, constipation, diarrhea, hematochezia/melena. Two episodes of vomiting. Neuro:  No focal weakness, abnormal movements or seizure like activity. Skin:   No rashes, no itching. :   No hematuria, no pyuria, no dysuria, no flank pain. Extremities:  No swelling or joint pains.   ROS was otherwise negative except as

## 2019-05-29 NOTE — ED PROVIDER NOTES
I performed a history and physical examination of the patient and discussed management with the resident. I reviewed the residents note and agree with the documented findings and plan of care. Any areas of disagreement are noted on the chart. I was personally present for the key portions of any procedures. I have documented in the chart those procedures where I was not present during the key portions. I have reviewed the emergency nurses triage note. I agree with the chief complaint, past medical history, past surgical history, allergies, medications, social and family history as documented unless otherwise noted below. Documentation of the HPI, Physical Exam and Medical Decision Making performed by medical students or scribes is based on my personal performance of the HPI, PE and MDM. For Phys Assistant/ Nurse Practitioner cases/documentation I have personally evaluated this patient and have completed at least one if not all key elements of the E/M (history, physical exam, and MDM). I find the patient's history and physical exam are consistent with the NP/PA documentation. I agree with the care provided, treatment rendered, disposition and followup plan. Additional findings are as noted. Marcelle Kumari. Andrea Montgomery MD  Attending Emergency  Physician    C/O NAUSEA, VOMITING, POSS COFFEE GROUND EMESIS. HX OF CHRONIC ETOH ABUSE, HAD BEEN SOBER FOR A MONTH AND A HALF, BUT STARTED DRINKING AGAIN ONE WEEK AGO AND HAS BEEN DRINKING HEAVILY SINCE. LAST ETOH AT 1800H TONIGHT. HX OF WITHDRAWAL SEIZURES? NO HX OF DT'S. NO MELENA, HEMATOCHEZIA, SOB, CHEST PAIN, DIARRHEA. AWAKE, ALERT, COOP, RESP. LUNGS CLEAR MANUEL. NO RALES, RHONCHI, WHEEZES, STRIDOR, RETRACTIONS. CARDIAC-S1S2, RRR, NO MRG. ABD SOFT, NONDISTENDED, NONTENDER. NORMAL BOWEL SOUNDS. SPEECH FLUENT, NORMAL COMPREHENSION. GCS-15. ORIENTED X4. LAB RESULTS REVIEWED. NO CLINICALLY SIGNIFICANT OR DIAGNOSTIC ABNORMALITIES. CIWA~4.   IMP-ETOH ABUSE. PROB GASTRITIS. POTENTIAL WITHDRAWAL SEIZURES. PLAN-ADMIT, LORAZEPAM AS NEEDED.         Hima Lucas MD  05/28/19 [id number]

## 2019-05-29 NOTE — ED PROVIDER NOTES
[facility]MED [facility]  Emergency Department Encounter  EmergencyMedicine Resident     Pt Jack Escamilla  MRN: [id number]  [place] 1970  Date of evaluation: 5/28/19  PCP:  . None (Inactive)    CHIEF COMPLAINT       Chief Complaint   Patient presents with    Hematemesis     PT IS AN ALCOHOLIC STATES HE QUIT BUT RELAPSED TODAY - SOON AFTER THREW UP BLOOD       HISTORY OF PRESENT ILLNESS  (Location/Symptom, Timing/Onset, Context/Setting, Quality, Duration, Modifying Factors, Severity.)      Gerald Diego is a 47 y.o. male who presents with chief complaint of coffee-ground emesis. Patient has a long-standing history of alcoholism. Was previously sober about a week ago for about a month and a half. However, due to personal stressors at home, started drinking again a week ago. Recently the fifth of vodka daily. Since that this morning he woke up, and had an episode of emesis that was dark brown in color. Again had another episode about an hour prior to arrival.  Last drink was 6 PM.  Denies any other constitutional complaints. Does not take any anticoagulants. PAST MEDICAL / SURGICAL / SOCIAL / FAMILY HISTORY      has a past medical history of Alcohol abuse, GI bleed, Hematemesis, Hypertension, Intractable vomiting, and Suicidal behavior. has a past surgical history that includes hernia repair; Upper gastrointestinal endoscopy (6-5-14); Inguinal hernia repair (05/2019); and hernia repair (Right, 5/16/2019).     Social History     Socioeconomic History    Marital status:      Spouse name: Not on file    Number of children: Not on file    Years of education: Not on file    Highest education level: Not on file   Occupational History    Not on file   Social Needs    Financial resource strain: Not on file    Food insecurity:     Worry: Not on file     Inability: Not on file    Transportation needs:     Medical: Not on file     Non-medical: Not on file   Tobacco Use    Smoking status: Never Smoker    Smokeless tobacco: Never Used    Tobacco comment: pt is a nonsmioker   Substance and Sexual Activity    Alcohol use: Not Currently     Comment: Sober now 45days    Drug use: No    Sexual activity: Not Currently   Lifestyle    Physical activity:     Days per week: Not on file     Minutes per session: Not on file    Stress: Not on file   Relationships    Social connections:     Talks on phone: Not on file     Gets together: Not on file     Attends Restorationism service: Not on file     Active member of club or organization: Not on file     Attends meetings of clubs or organizations: Not on file     Relationship status: Not on file    Intimate partner violence:     Fear of current or ex partner: Not on file     Emotionally abused: Not on file     Physically abused: Not on file     Forced sexual activity: Not on file   Other Topics Concern    Not on file   Social History Narrative    Not on file       Family History   Adopted: Yes   Problem Relation Age of Onset    Asthma Mother     Crohn's Disease Mother     No Known Problems Father     Other Sister         Fibromyalgia    No Known Problems Brother     No Known Problems Maternal Grandmother     No Known Problems Maternal Grandfather     No Known Problems Paternal Grandmother     No Known Problems Paternal Grandfather     No Known Problems Daughter     Depression Daughter     No Known Problems Son        Allergies:  Pcn [penicillins]    Home Medications:  Prior to Admission medications    Medication Sig Start Date End Date Taking?  Authorizing Provider   ondansetron (ZOFRAN) 4 MG tablet Take 1 tablet by mouth every 8 hours as needed for Nausea or Vomiting 5/16/19   Guanako Flower, DO   docusate sodium (COLACE) 100 MG capsule Take 1 capsule by mouth daily as needed for Constipation 5/16/19 6/15/19  Guanako Flower, DO   QUEtiapine (SEROQUEL XR) 150 MG TB24 extended release tablet Take 150 mg by mouth daily    Historical Provider, MD escitalopram (LEXAPRO) 20 MG tablet Take 1 tablet by mouth daily 4/15/19   Shea Alexis MD   traZODone (DESYREL) 50 MG tablet Take 1 tablet by mouth nightly as needed for Sleep 4/15/19   Shea Alexis MD   QUEtiapine (SEROQUEL) 300 MG tablet Take 1 tablet by mouth nightly 4/15/19   Shea Alexis MD       REVIEW OF SYSTEMS    (2-9 systems for level 4, 10 or more for level 5)      Review of Systems   Constitutional: Negative for chills and fever. HENT: Negative for rhinorrhea and sore throat. Eyes: Negative for pain and visual disturbance. Respiratory: Negative for cough and shortness of breath. Cardiovascular: Negative for chest pain and palpitations. Gastrointestinal: Positive for nausea and vomiting. Negative for abdominal pain. Genitourinary: Negative for difficulty urinating and dysuria. Musculoskeletal: Negative for arthralgias and myalgias. Skin: Negative for color change and wound. Neurological: Negative for weakness, numbness and headaches. Psychiatric/Behavioral: Negative for behavioral problems and dysphoric mood. PHYSICAL EXAM   (up to 7 for level 4, 8 or more for level 5)      INITIAL VITALS:   /78   Pulse 83   Temp 98.3 °F (36.8 °C) (Oral)   Resp 16   Ht 5' 7\" (1.702 m)   Wt 150 lb (68 kg)   SpO2 94%   BMI 23.49 kg/m²     Physical Exam   Constitutional: He is oriented to person, place, and time. He appears well-developed and well-nourished. No distress. HENT:   Head: Normocephalic and atraumatic. Mouth/Throat: Oropharynx is clear and moist.   Eyes: Pupils are equal, round, and reactive to light. EOM are normal.   Neck: Normal range of motion. Cardiovascular: Normal rate and regular rhythm. Pulmonary/Chest: Effort normal. He has no wheezes. He has no rales. Abdominal: Soft. There is no tenderness. There is no rebound and no guarding. Soft, nontender, non-peritoneal   Musculoskeletal: Normal range of motion.    Neurological: He is alert and oriented to person, place, and time. He has normal strength. GCS eye subscore is 4. GCS verbal subscore is 5. GCS motor subscore is 6. Skin: Skin is warm and dry.    Psychiatric: His behavior is normal.       DIFFERENTIAL  DIAGNOSIS     PLAN (LABS / IMAGING / EKG):  Orders Placed This Encounter   Procedures    CBC Auto Differential    Comprehensive Metabolic Panel    LIPASE    SPECIMEN REJECTION    Protime-INR    APTT    PREVIOUS SPECIMEN    Basic Metabolic Panel w/ Reflex to MG    CBC    Protime-INR    Hemoglobin    Diet NPO Effective Now Exceptions are: Ice Chips    Vital signs per unit routine    Telemetry monitoring    Notify physician    Bedrest with bathroom privileges    Daily weights    Intake and output    Nursing to document all stools for color and consistency    Place intermittent pneumatic compression device    Tobacco cessation education protocol    Full Code    Inpatient consult to Hospitalist    Initiate Oxygen Therapy Protocol    Insert peripheral IV    PATIENT STATUS (FROM ED OR OR/PROCEDURAL) Inpatient    Alcohol and or drug assessment    Seizure precautions    Fall precautions       MEDICATIONS ORDERED:  Orders Placed This Encounter   Medications    aluminum & magnesium hydroxide-simethicone (MAALOX) 200-200-20 MG/5ML suspension 15 mL    famotidine (PEPCID) injection 20 mg    LORazepam (ATIVAN) tablet 1 mg    docusate sodium (COLACE) capsule 100 mg    escitalopram (LEXAPRO) tablet 20 mg    QUEtiapine (SEROQUEL XR) extended release tablet 150 mg    QUEtiapine (SEROQUEL) tablet 300 mg    traZODone (DESYREL) tablet 50 mg    0.9 % sodium chloride infusion    sodium chloride flush 0.9 % injection 10 mL    sodium chloride flush 0.9 % injection 10 mL    sodium chloride flush 0.9 % injection 10 mL    sodium chloride flush 0.9 % injection 10 mL    acetaminophen (TYLENOL) tablet 650 mg    OR Linked Order Group     HYDROcodone-acetaminophen (NORCO) 5-325 MG per tablet 1 tablet     HYDROcodone-acetaminophen (NORCO) 5-325 MG per tablet 2 tablet    OR Linked Order Group     morphine (PF) injection 2 mg     morphine injection 4 mg    ondansetron (ZOFRAN) injection 4 mg    pantoprazole (PROTONIX) 80 mg in sodium chloride 0.9 % 100 mL infusion    vitamin B-1 (THIAMINE) tablet 276 mg    folic acid (FOLVITE) tablet 1 mg    multivitamin 1 tablet    OR Linked Order Group     LORazepam (ATIVAN) tablet 1 mg     LORazepam (ATIVAN) injection 1 mg     LORazepam (ATIVAN) tablet 2 mg     LORazepam (ATIVAN) injection 2 mg     LORazepam (ATIVAN) tablet 3 mg     LORazepam (ATIVAN) injection 3 mg     LORazepam (ATIVAN) tablet 4 mg     LORazepam (ATIVAN) injection 4 mg       DIAGNOSTIC RESULTS / EMERGENCY DEPARTMENT COURSE / MDM     LABS:  Results for orders placed or performed during the hospital encounter of 05/28/19   CBC Auto Differential   Result Value Ref Range    WBC 9.0 3.5 - 11.3 k/uL    RBC 4.38 4.21 - 5.77 m/uL    Hemoglobin 14.1 13.0 - 17.0 g/dL    Hematocrit 42.1 40.7 - 50.3 %    MCV 96.1 82.6 - 102.9 fL    MCH 32.2 25.2 - 33.5 pg    MCHC 33.5 28.4 - 34.8 g/dL    RDW 13.0 11.8 - 14.4 %    Platelets 373 766 - 807 k/uL    MPV 10.1 8.1 - 13.5 fL    NRBC Automated 0.0 0.0 per 100 WBC    Differential Type NOT REPORTED     Seg Neutrophils 69 (H) 36 - 65 %    Lymphocytes 17 (L) 24 - 43 %    Monocytes 10 3 - 12 %    Eosinophils % 3 1 - 4 %    Basophils 1 0 - 2 %    Immature Granulocytes 0 0 %    Segs Absolute 6.26 1.50 - 8.10 k/uL    Absolute Lymph # 1.53 1.10 - 3.70 k/uL    Absolute Mono # 0.88 0.10 - 1.20 k/uL    Absolute Eos # 0.24 0.00 - 0.44 k/uL    Basophils # 0.06 0.00 - 0.20 k/uL    Absolute Immature Granulocyte 0.03 0.00 - 0.30 k/uL    WBC Morphology NOT REPORTED     RBC Morphology NOT REPORTED     Platelet Estimate NOT REPORTED    Comprehensive Metabolic Panel   Result Value Ref Range    Glucose 79 70 - 99 mg/dL    BUN 14 6 - 20 mg/dL    CREATININE 0.75 0.70 - 1.20 mg/dL    Bun/Cre Ratio NOT REPORTED 9 - 20    Calcium 9.2 8.6 - 10.4 mg/dL    Sodium 138 135 - 144 mmol/L    Potassium 4.3 3.7 - 5.3 mmol/L    Chloride 102 98 - 107 mmol/L    CO2 18 (L) 20 - 31 mmol/L    Anion Gap 18 (H) 9 - 17 mmol/L    Alkaline Phosphatase 67 40 - 129 U/L    ALT 12 5 - 41 U/L    AST 21 <40 U/L    Total Bilirubin 0.31 0.3 - 1.2 mg/dL    Total Protein 7.9 6.4 - 8.3 g/dL    Alb 4.6 3.5 - 5.2 g/dL    Albumin/Globulin Ratio 1.4 1.0 - 2.5    GFR Non-African American >60 >60 mL/min    GFR African American >60 >60 mL/min    GFR Comment          GFR Staging NOT REPORTED    LIPASE   Result Value Ref Range    Lipase 21 13 - 60 U/L   SPECIMEN REJECTION   Result Value Ref Range    Specimen Source ER     Ordered Test PT,PTT     Reason for Rejection       Unable to perform testing: Specimen quantity not sufficient.    - NOT REPORTED    Protime-INR   Result Value Ref Range    Protime 10.1 9.0 - 12.0 sec    INR 0.9    APTT   Result Value Ref Range    PTT 22.0 20.5 - 30.5 sec       IMPRESSION: Patient presents with coffee-ground emesis and concerns for alcohol withdrawal.  Vitals are stable. Patient is nontoxic. Doesn't appear to be in active withdrawal right now. Physical exam is unremarkable. Abdomen soft, nontender, non-peritoneal.  Given his presentation, we'll plan to obtain some basic labs, coag studies, and reassess. RADIOLOGY:  None    EKG  None    All EKG's are interpreted by the Emergency Department Physician who either signs or Co-signs this chart in the absence of a cardiologist.    EMERGENCY DEPARTMENT COURSE:  Patient states that he started to feel a little jittery here in the ED. As such, he was given Ativan. Patient was updated on labs. States that he would like help with his alcoholism. Also discussed the possibility of having GI see him for his coffee-ground emesis. Patient is agreeable to plan, awaiting transfer to the floor.     PROCEDURES:  None    CONSULTS:  IP CONSULT TO HOSPITALIST    CRITICAL CARE:  None    FINAL IMPRESSION      1. Coffee ground emesis    2. Alcohol withdrawal syndrome without complication ([facility] [address]          DISPOSITION / PLAN     DISPOSITION Admitted 05/28/2019 09:32:43 PM      PATIENT REFERRED TO:  .  None            DISCHARGE MEDICATIONS:  Current Discharge Medication List          Dalila Ortiz MD  Emergency Medicine Resident    (Please note that portions of thisnote were completed with a voice recognition program.  Efforts were made to edit the dictations but occasionally words are mis-transcribed.)       Angeles Echeverria MD  Resident  05/28/19 [id number]

## 2019-05-29 NOTE — PLAN OF CARE
[facility]    Second Visit Note  For more detailed information please refer to the progress note of the day      5/29/2019    5:25 PM    Name:   Solange Perdomo  MRN:     [id number]     Juhiide:      [de-identified]   Room:   [address] [place], [place] [address] Day:  1  Admit Date:  5/28/2019  7:47 PM    PCP:   . None (Inactive)  Code Status:  Full Code        Pt vitals were reviewed   New labs were reviewed   Patient was seen    Updated plan :     1. Patient currently sedated with alcohol withdrawal protocol. Scheduled for EGD tomorrow evaluation.         Carolina Hoep DO  5/29/2019  5:25 PM

## 2019-05-29 NOTE — PROGRESS NOTES
Smoking Cessation - topics covered   []  Health Risks  []  Benefits of Quitting   []  Smoking Cessation  [x]  Patient has no history of tobacco use  []  Patient is former smoker. [x]  No need for tobacco cessation education. []  Booklet given  []  Patient verbalizes understanding. []  Patient denies need for tobacco cessation education. []  Unable to meet with patient today. Will follow up as able.   Shirley Sellers  8:22 AM

## 2019-05-30 ENCOUNTER — ANESTHESIA (OUTPATIENT)
Dept: OPERATING ROOM | Age: 49
DRG: 770 | End: 2019-05-30
Payer: COMMERCIAL

## 2019-05-30 ENCOUNTER — ANESTHESIA EVENT (OUTPATIENT)
Dept: OPERATING ROOM | Age: 49
DRG: 770 | End: 2019-05-30
Payer: COMMERCIAL

## 2019-05-30 VITALS
HEART RATE: 83 BPM | BODY MASS INDEX: 23.54 KG/M2 | WEIGHT: 150 LBS | OXYGEN SATURATION: 99 % | RESPIRATION RATE: 19 BRPM | DIASTOLIC BLOOD PRESSURE: 71 MMHG | TEMPERATURE: 97 F | HEIGHT: 67 IN | SYSTOLIC BLOOD PRESSURE: 96 MMHG

## 2019-05-30 VITALS — DIASTOLIC BLOOD PRESSURE: 74 MMHG | OXYGEN SATURATION: 98 % | SYSTOLIC BLOOD PRESSURE: 119 MMHG

## 2019-05-30 PROBLEM — K29.00 ACUTE SUPERFICIAL GASTRITIS WITHOUT HEMORRHAGE: Status: ACTIVE | Noted: 2019-05-30

## 2019-05-30 PROBLEM — K29.80 DUODENITIS: Status: ACTIVE | Noted: 2019-05-30

## 2019-05-30 LAB
ABSOLUTE EOS #: 0.32 K/UL (ref 0–0.44)
ABSOLUTE IMMATURE GRANULOCYTE: <0.03 K/UL (ref 0–0.3)
ABSOLUTE LYMPH #: 1.05 K/UL (ref 1.1–3.7)
ABSOLUTE MONO #: 0.54 K/UL (ref 0.1–1.2)
ANION GAP SERPL CALCULATED.3IONS-SCNC: 12 MMOL/L (ref 9–17)
BASOPHILS # BLD: 1 % (ref 0–2)
BASOPHILS ABSOLUTE: 0.03 K/UL (ref 0–0.2)
BUN BLDV-MCNC: 11 MG/DL (ref 6–20)
BUN/CREAT BLD: ABNORMAL (ref 9–20)
CALCIUM SERPL-MCNC: 7.8 MG/DL (ref 8.6–10.4)
CHLORIDE BLD-SCNC: 106 MMOL/L (ref 98–107)
CO2: 19 MMOL/L (ref 20–31)
CREAT SERPL-MCNC: 0.65 MG/DL (ref 0.7–1.2)
DIFFERENTIAL TYPE: ABNORMAL
EOSINOPHILS RELATIVE PERCENT: 6 % (ref 1–4)
GFR AFRICAN AMERICAN: >60 ML/MIN
GFR NON-AFRICAN AMERICAN: >60 ML/MIN
GFR SERPL CREATININE-BSD FRML MDRD: ABNORMAL ML/MIN/{1.73_M2}
GFR SERPL CREATININE-BSD FRML MDRD: ABNORMAL ML/MIN/{1.73_M2}
GLUCOSE BLD-MCNC: 77 MG/DL (ref 70–99)
HCT VFR BLD CALC: 36.6 % (ref 40.7–50.3)
HEMOGLOBIN: 11.3 G/DL (ref 13–17)
HEMOGLOBIN: 11.4 G/DL (ref 13–17)
IMMATURE GRANULOCYTES: 0 %
LYMPHOCYTES # BLD: 20 % (ref 24–43)
MCH RBC QN AUTO: 31.8 PG (ref 25.2–33.5)
MCHC RBC AUTO-ENTMCNC: 30.9 G/DL (ref 28.4–34.8)
MCV RBC AUTO: 103.1 FL (ref 82.6–102.9)
MONOCYTES # BLD: 10 % (ref 3–12)
NRBC AUTOMATED: 0 PER 100 WBC
PDW BLD-RTO: 13.1 % (ref 11.8–14.4)
PLATELET # BLD: 237 K/UL (ref 138–453)
PLATELET ESTIMATE: ABNORMAL
PMV BLD AUTO: 10 FL (ref 8.1–13.5)
POTASSIUM SERPL-SCNC: 3.8 MMOL/L (ref 3.7–5.3)
RBC # BLD: 3.55 M/UL (ref 4.21–5.77)
RBC # BLD: ABNORMAL 10*6/UL
SEG NEUTROPHILS: 62 % (ref 36–65)
SEGMENTED NEUTROPHILS ABSOLUTE COUNT: 3.25 K/UL (ref 1.5–8.1)
SODIUM BLD-SCNC: 137 MMOL/L (ref 135–144)
WBC # BLD: 5.2 K/UL (ref 3.5–11.3)
WBC # BLD: ABNORMAL 10*3/UL

## 2019-05-30 PROCEDURE — 85025 COMPLETE CBC W/AUTO DIFF WBC: CPT

## 2019-05-30 PROCEDURE — 0DB68ZX EXCISION OF STOMACH, VIA NATURAL OR ARTIFICIAL OPENING ENDOSCOPIC, DIAGNOSTIC: ICD-10-PCS | Performed by: INTERNAL MEDICINE

## 2019-05-30 PROCEDURE — 43239 EGD BIOPSY SINGLE/MULTIPLE: CPT | Performed by: INTERNAL MEDICINE

## 2019-05-30 PROCEDURE — 2580000003 HC RX 258: Performed by: INTERNAL MEDICINE

## 2019-05-30 PROCEDURE — 6360000002 HC RX W HCPCS: Performed by: NURSE ANESTHETIST, CERTIFIED REGISTERED

## 2019-05-30 PROCEDURE — 85018 HEMOGLOBIN: CPT

## 2019-05-30 PROCEDURE — 3700000000 HC ANESTHESIA ATTENDED CARE: Performed by: INTERNAL MEDICINE

## 2019-05-30 PROCEDURE — 6370000000 HC RX 637 (ALT 250 FOR IP): Performed by: NURSE PRACTITIONER

## 2019-05-30 PROCEDURE — 7100000000 HC PACU RECOVERY - FIRST 15 MIN: Performed by: INTERNAL MEDICINE

## 2019-05-30 PROCEDURE — 3700000001 HC ADD 15 MINUTES (ANESTHESIA): Performed by: INTERNAL MEDICINE

## 2019-05-30 PROCEDURE — 88305 TISSUE EXAM BY PATHOLOGIST: CPT

## 2019-05-30 PROCEDURE — 88342 IMHCHEM/IMCYTCHM 1ST ANTB: CPT

## 2019-05-30 PROCEDURE — 7100000001 HC PACU RECOVERY - ADDTL 15 MIN: Performed by: INTERNAL MEDICINE

## 2019-05-30 PROCEDURE — 2500000003 HC RX 250 WO HCPCS: Performed by: NURSE ANESTHETIST, CERTIFIED REGISTERED

## 2019-05-30 PROCEDURE — 36415 COLL VENOUS BLD VENIPUNCTURE: CPT

## 2019-05-30 PROCEDURE — 3609012400 HC EGD TRANSORAL BIOPSY SINGLE/MULTIPLE: Performed by: INTERNAL MEDICINE

## 2019-05-30 PROCEDURE — 2709999900 HC NON-CHARGEABLE SUPPLY: Performed by: INTERNAL MEDICINE

## 2019-05-30 PROCEDURE — 80048 BASIC METABOLIC PNL TOTAL CA: CPT

## 2019-05-30 RX ORDER — MULTIVITAMIN WITH FOLIC ACID 400 MCG
1 TABLET ORAL DAILY
Refills: 0 | COMMUNITY
Start: 2019-05-30

## 2019-05-30 RX ORDER — PANTOPRAZOLE SODIUM 40 MG/1
40 TABLET, DELAYED RELEASE ORAL
Qty: 30 TABLET | Refills: 5 | Status: SHIPPED | OUTPATIENT
Start: 2019-05-30

## 2019-05-30 RX ORDER — PROPOFOL 10 MG/ML
INJECTION, EMULSION INTRAVENOUS PRN
Status: DISCONTINUED | OUTPATIENT
Start: 2019-05-30 | End: 2019-05-30 | Stop reason: SDUPTHER

## 2019-05-30 RX ORDER — LIDOCAINE HYDROCHLORIDE 10 MG/ML
INJECTION, SOLUTION EPIDURAL; INFILTRATION; INTRACAUDAL; PERINEURAL PRN
Status: DISCONTINUED | OUTPATIENT
Start: 2019-05-30 | End: 2019-05-30 | Stop reason: SDUPTHER

## 2019-05-30 RX ADMIN — PROPOFOL 200 MG: 10 INJECTION, EMULSION INTRAVENOUS at 10:01

## 2019-05-30 RX ADMIN — PROPOFOL 50 MG: 10 INJECTION, EMULSION INTRAVENOUS at 10:06

## 2019-05-30 RX ADMIN — LIDOCAINE HYDROCHLORIDE 50 MG: 10 INJECTION, SOLUTION EPIDURAL; INFILTRATION; INTRACAUDAL; PERINEURAL at 10:01

## 2019-05-30 RX ADMIN — SODIUM CHLORIDE: 9 INJECTION, SOLUTION INTRAVENOUS at 09:05

## 2019-05-30 RX ADMIN — LORAZEPAM 1 MG: 1 TABLET ORAL at 03:58

## 2019-05-30 ASSESSMENT — PULMONARY FUNCTION TESTS
PIF_VALUE: 1
PIF_VALUE: 0
PIF_VALUE: 1

## 2019-05-30 ASSESSMENT — PAIN SCALES - GENERAL: PAINLEVEL_OUTOF10: 0

## 2019-05-30 ASSESSMENT — PAIN - FUNCTIONAL ASSESSMENT: PAIN_FUNCTIONAL_ASSESSMENT: 0-10

## 2019-05-30 NOTE — DISCHARGE SUMMARY
[facility]    Discharge Summary     Patient ID: Sri Mccormick  :  1970   MRN: [id number]     ACCOUNT:  [de-identified]   Patient's PCP: . None (Inactive)  Admit Date: 2019   Discharge Date: 2019     Length of Stay: 2  Code Status:  Full Code  Admitting Physician: Philip Huggins DO  Discharge Physician: Philip Huggins DO     Active Discharge Diagnoses:     Hospital Problem Lists:  Principal Problem:    Al[facility] withdrawal syndrome [facility]  Active Problems:    Alcohol abuse    Esophagitis    Essential hypertension    Coffee ground emesis    Severe major depression (HCC)    Hepatic steatosis    Metabolic acidosis, increased anion gap    Acute superficial gastritis without hemorrhage    Duodenitis  Resolved Problems:    * No resolved hospital problems. *      Admission Condition:  fair     Discharged Condition: stable    Hospital Stay:     Hospital Course:  Sri Mccormick is a 47 y.o. male who was admitted for the management of   Alcohol withdrawal syndrome without complication ([facility] [address] , presented to ER with Hematemesis (PT IS AN ALCOHOLIC STATES HE QUIT BUT RELAPSED TODAY - SOON AFTER THREW UP BLOOD)    This is a 65-year-old white male with history of alcohol abuse had been sober for approximately 45 days. He's had some stress with his living situation and has relapsed drinking constantly 1 week ago. He's been drinking a fifth of hard alcohol daily and presented with withdrawal symptoms. He also reports 2 episodes of hematemesis associated with the symptoms. GI was consult and he underwent EGD on the morning of May 30 and promptly signed out AMA. During his hospital stay history with IV Protonix drip.       Significant therapeutic interventions: As above    Significant Diagnostic Studies:   Labs / Micro:  CBC:   Lab Results   Component Value Date    WBC 5.2 2019    RBC 3.55 2019    HGB 11.3 05/30/2019    HCT 36.6 05/30/2019    .1 05/30/2019    MCH 31.8 05/30/2019    MCHC 30.9 05/30/2019    RDW 13.1 05/30/2019     05/30/2019     BMP:    Lab Results   Component Value Date    GLUCOSE 77 05/30/2019     05/30/2019    K 3.8 05/30/2019     05/30/2019    CO2 19 05/30/2019    ANIONGAP 12 05/30/2019    BUN 11 05/30/2019    CREATININE 0.65 05/30/2019    BUNCRER NOT REPORTED 05/30/2019    CALCIUM 7.8 05/30/2019    LABGLOM >60 05/30/2019    GFRAA >60 05/30/2019    GFR      05/30/2019    GFR NOT REPORTED 05/30/2019        Radiology:  Us Liver    Result Date: 5/29/2019  Mild heterogeneous hepatic parenchymal echotexture most commonly related to hepatic steatosis. No ascites. Patent portal vein. Consultations:    Consults:     Final Specialist Recommendations/Findings:   IP CONSULT TO HOSPITALIST  IP CONSULT TO GI      The patient was seen and examined on day of discharge and this discharge summary is in conjunction with any daily progress note from day of discharge. Dis[facility] plan:     [facility]    Physician Follow Up: Follow with GI as directed  . None             Requiring Further Evaluation/Follow Up POST HOSPITALIZATION/Incidental Findings: None    Diet: regular diet    Activity: As tolerated    Instructions to Patient: Take medications as prescribed.   Follow up with AA/alcohol treatment center of choice as an outpatient    Discharge Medications:      Medication List      START taking these medications    multivitamin tablet  Take 1 tablet by mouth daily     pantoprazole 40 MG tablet  Commonly known as:  PROTONIX  Take 1 tablet by mouth every morning (before breakfast)        CONTINUE taking these medications    docusate sodium 100 MG capsule  Commonly known as:  COLACE  Take 1 capsule by mouth daily as needed for Constipation     escitalopram 20 MG tablet  Commonly known as:  LEXAPRO  Take 1 tablet by mouth daily     ondansetron 4 MG tablet  Commonly known as:

## 2019-05-30 NOTE — OP NOTE
[facility]    [facility] ENDOSCOPY     EGD    PROCEDURE DATE: 05/30/19    REFERRING PHYSICIAN: No ref. provider found     PRIMARY CARE PROVIDER: . None (Inactive)    ATTENDING PHYSICIAN: Amaury Nelson MD     HISTORY: Mr. Sri Mccormick is a 47 y.o. male who presents to the [facility] Endoscopy unit for upper endoscopy/OR. The patient's clinical history is remarkable for alcohol abuse with out cirrhosis, prior history of alcohol withdrawal, anxiety, reported episodes of hematemesis and melena. He is currently medically stable and appropriate for the planned procedure. PREOPERATIVE DIAGNOSIS: suspected UGI bleed. PROCEDURES:   1) Transoral Upper Endoscopy with gastric biopsy. POSTOPERATIVE DIAGNOSIS:     No signs of active bleeding    1-Mild LA grade A esophagitis, inactive and healing. No esophageal varices, no MWT. Small type 1 Hiatal hernia, 1 cm in length, no main erosions. 2-Mild non-specific gastritis, cold biopsy taken for H. Pylori. No ulcerations, no erosions, no residual blood, no obvious sources of hemorrhage or anemia. 3-Mild duodenitis, no ulcerations, no erosions, no sources of hemorrhage or anemia identified    MEDICATIONS:   MAC per anesthesia     EBL: 5cc     INSTRUMENT: Olympus GIF-H180  flexible Gastroscope. PREPARATION: The nature and character of the procedure as well as risks, benefits, and alternatives were discussed with the patient and informed consent was obtained. Complications were said to include, but were not limited to: medication allergy, medication reaction, cardiovascular and respiratory problems, bleeding, perforation, infection, and/or missed diagnosis. Following arrival in the endoscopy room, the patient was placed in the left lateral decubitus position and final time-out accomplished in the presence of the nursing staff. Baseline vital signs were obtained and reviewed, and IV sedation was subsequently initiated. FINDINGS:   Esophagus:  The esophagus was inspected to the Z-line. The endoscopic exam showed mild LA grade A esophagitis. Small type 1 Hiatal hernia, no main erosions, no MWT. No EV. Stomach: The stomach was inspected in both forward and retroflex fashion and was appropriately distensible. The cardia, fundus, incisura, antrum and pylorus were identified via direct visualization. The endoscopic exam showed mild non-specific gastritis, no ulcerations, no gastric varices, no erosions, no sources of hemorrhage or anemia identified. Duodenum: The proximal small bowel was inspected through the bulb, sweep, and second portion of the duodenum. The endoscopic exam showed mild duodenitis with out sources of bleeding or anemia identified. Mild Esophagitis:      Type 1 Hiatal hernia (1 cm in length)          Duodenum:      IMPRESSION:    No signs of active bleeding    1-Mild LA grade A esophagitis, inactive and healing. No esophageal varices, no MWT. Small type 1 Hiatal hernia, 1 cm in length, no main erosions. 2-Mild non-specific gastritis, cold biopsy taken for H. Pylori. No ulcerations, no erosions, no residual blood, no obvious sources of hemorrhage or anemia. 3-Mild duodenitis, no ulcerations, no erosions, no sources of hemorrhage or anemia identified      RECOMMENDATIONS:   1) Can transition to PO prilosec 80 mg BID (continue for 8 weeks), CLD, advance as tolerated. 2) Patient will aggressive counseling on alcohol cessation. Avoids NSAIDS and smoking. Return back to medical floor. If patient has stable Hg, tolerating diet, and not withdrawing from alcohol, ok from GI standpoint for disposition.        [facility]

## 2019-05-30 NOTE — ANESTHESIA PRE PROCEDURE
Department of Anesthesiology  Preprocedure Note       Name:  Amada Blankenship   Age:  47 y.o.  :  1970                                          MRN:  [id number]         Date:  2019      Surgeon: Bob Corbett):  Chanelle Reynolds MD    Procedure: EGD ESOPHAGOGASTRODUODENOSCOPY (N/A )    Medications prior to admission:   Prior to Admission medications    Medication Sig Start Date End Date Taking? Authorizing Provider   ondansetron (ZOFRAN) 4 MG tablet Take 1 tablet by mouth every 8 hours as needed for Nausea or Vomiting 19   Agnes WernerlandDO   docusate sodium (COLACE) 100 MG capsule Take 1 capsule by mouth daily as needed for Constipation 5/16/19 6/15/19  Agnes Melton DO   QUEtiapine (SEROQUEL XR) 150 MG TB24 extended release tablet Take 150 mg by mouth daily    Historical Provider, MD   escitalopram (LEXAPRO) 20 MG tablet Take 1 tablet by mouth daily 4/15/19   Jayla Boyer MD   traZODone (DESYREL) 50 MG tablet Take 1 tablet by mouth nightly as needed for Sleep 4/15/19   Jayla Boyer MD   QUEtiapine (SEROQUEL) 300 MG tablet Take 1 tablet by mouth nightly 4/15/19   Jayla Boyer MD       Current medications:    No current facility-administered medications for this visit. No current outpatient medications on file.      Facility-Administered Medications Ordered in Other Visits   Medication Dose Route Frequency Provider Last Rate Last Dose    folic acid 1 mg, thiamine (B-1) 100 mg in dextrose 5 % 50 mL IVPB   Intravenous Daily Dai Hope DO   Stopped at 19 1150    docusate sodium (COLACE) capsule 100 mg  100 mg Oral Daily PRN Luis Flash, APRN - CNP        escitalopram (LEXAPRO) tablet 20 mg  20 mg Oral Daily Luis Flash, APRN - CNP        QUEtiapine (SEROQUEL XR) extended release tablet 150 mg  150 mg Oral Daily Luis Flash, APRN - CNP        QUEtiapine (SEROQUEL) tablet 300 mg  300 mg Oral Nightly Luis Flash, APRN - CNP   300 mg at Roylene Bang, APRN - CNP        Or    LORazepam (ATIVAN) tablet 3 mg  3 mg Oral Q1H PRN Roylene Bang, APRN - CNP        Or    LORazepam (ATIVAN) injection 3 mg  3 mg Intravenous Q1H PRN Roylene Bang, APRN - CNP        Or    LORazepam (ATIVAN) tablet 4 mg  4 mg Oral Q1H PRN Roylene Bang, APRN - CNP        Or    LORazepam (ATIVAN) injection 4 mg  4 mg Intravenous Q1H PRN Roylene Bang, APRN - CNP           Allergies: Allergies   Allergen Reactions    Pcn [Penicillins] Rash       Problem List:    Patient Active Problem List   Diagnosis Code    Alcohol abuse F10.10    Gastritis K29.70    Esophagitis K20.9    Hypokalemia E87.6    Intractable nausea and vomiting R11.2    Anxiety F41.9    Essential hypertension I10    Coffee ground emesis K92.0    Severe major depression without psychotic features ([address] F32.2    Alcohol withdrawal syndrome without complication ([address] O78.877    History of esophagitis Z87.19    Fever R50.9    Major depressive disorder, recurrent ([address] F33.9    Severe major depression ([address] F32.2    Alcohol withdrawal syndrome without complication ([address] F03.371    Hepatic steatosis N58.3    Metabolic acidosis, increased anion gap E87.2       Past Medical History:        Diagnosis Date    Alcohol abuse 6/5/2014    GI bleed 6/5/2014    Hematemesis 6/5/2014    Hypertension     Per patient BP only increases when he drinks alcohol.     Intractable vomiting 6/5/2014    Suicidal behavior 12/13/2018       Past Surgical History:        Procedure Laterality Date    HERNIA REPAIR      left    HERNIA REPAIR Right 5/16/2019    XI ROBOTIC LAPAROSCOPIC  INGUINAL HERNIA REPAIR WITH MESH, POSSIBLE BILATERAL, POSSIBLE OPEN performed by Orin Hills IV, DO at [address] [place] [address]  05/2019    With Mesh    UPPER GASTROINTESTINAL ENDOSCOPY  6-5-14    gastritis, esophagitis, ulcers-koko       Social History:    Social History Tobacco Use    Smoking status: Never Smoker    Smokeless tobacco: Never Used    Tobacco comment: pt is a nonsmioker   Substance Use Topics    Alcohol use: Not Currently     Comment: Sober now 45days                                Counseling given: Not Answered  Comment: pt is a nonsmioker      Vital Signs (Current): There were no vitals filed for this visit.                                            BP Readings from Last 3 Encounters:   05/30/19 104/74   05/16/19 123/81   05/16/19 (!) 144/92       NPO Status:                                                                                 BMI:   Wt Readings from Last 3 Encounters:   05/28/19 150 lb (68 kg)   05/01/19 139 lb 3.2 oz (63.1 kg)   02/22/19 135 lb (61.2 kg)     There is no height or weight on file to calculate BMI.    CBC:   Lab Results   Component Value Date    WBC 5.2 05/30/2019    RBC 3.55 05/30/2019    HGB 11.3 05/30/2019    HCT 36.6 05/30/2019    .1 05/30/2019    RDW 13.1 05/30/2019     05/30/2019       CMP:   Lab Results   Component Value Date     05/30/2019    K 3.8 05/30/2019     05/30/2019    CO2 19 05/30/2019    BUN 11 05/30/2019    CREATININE 0.65 05/30/2019    GFRAA >60 05/30/2019    LABGLOM >60 05/30/2019    GLUCOSE 77 05/30/2019    PROT 7.9 05/28/2019    CALCIUM 7.8 05/30/2019    BILITOT 0.31 05/28/2019    ALKPHOS 67 05/28/2019    AST 21 05/28/2019    ALT 12 05/28/2019       POC Tests:   Recent Labs     05/29/19  0744   POCGLU 82       Coags:   Lab Results   Component Value Date    PROTIME 9.9 05/29/2019    INR 0.9 05/29/2019    APTT 22.0 05/28/2019       HCG (If Applicable): No results found for: PREGTESTUR, PREGSERUM, HCG, HCGQUANT     ABGs: No results found for: PHART, PO2ART, MTK1CXG, JVG0EDM, BEART, M6QUXUIS     Type & Screen (If Applicable):  No results found for: LABABO, LABRH    Anesthesia Evaluation    Airway: Mallampati: II  TM distance: >3 FB     Mouth opening: > = 3 FB Dental: Pulmonary:Negative Pulmonary ROS breath sounds clear to auscultation                             Cardiovascular:    (+) hypertension:,         Rhythm: regular  Rate: normal                    Neuro/Psych:   (+) psychiatric history:            GI/Hepatic/Renal:   (+) liver disease:,          ROS comment: [facility]. Endo/Other: Negative Endo/Other ROS                    Abdominal:           Vascular: negative vascular ROS. Anesthesia Plan      general and MAC     ASA 3     (HTN )  Induction: intravenous. Anesthetic plan and risks discussed with patient. Plan discussed with CRNA.                   Jaydon Brar MD   5/30/2019

## 2019-05-30 NOTE — PROGRESS NOTES
Patient arrive back to room, Stating if he didn't see the doctor by 1200 that he would leave a.m.a. Tried talking to patient out of leaving but not successful. ama papers sign iv removed. Patient left 11:25 am walking. Dr Drew Farley aware.

## 2019-05-31 LAB — VITAMIN B1 WHOLE BLOOD: 117 NMOL/L (ref 70–180)

## 2019-06-03 LAB — SURGICAL PATHOLOGY REPORT: NORMAL

## 2019-06-13 ENCOUNTER — HOSPITAL ENCOUNTER (INPATIENT)
Age: 49
LOS: 4 days | Discharge: NURSING, REHAB OR LONG-TERM CARE | End: 2019-06-17
Attending: EMERGENCY MEDICINE | Admitting: FAMILY MEDICINE
Payer: COMMERCIAL

## 2019-06-13 DIAGNOSIS — F10.930 ALCOHOL WITHDRAWAL SYNDROME WITHOUT COMPLICATION (HCC): Primary | ICD-10-CM

## 2019-06-13 DIAGNOSIS — F10.930 ALCOHOL WITHDRAWAL SYNDROME, UNCOMPLICATED (HCC): ICD-10-CM

## 2019-06-13 DIAGNOSIS — K92.0 HEMATEMESIS WITH NAUSEA: ICD-10-CM

## 2019-06-13 DIAGNOSIS — E27.40 LOW SERUM ALDOSTERONE (HCC): ICD-10-CM

## 2019-06-13 PROBLEM — E44.0 MODERATE MALNUTRITION (HCC): Status: ACTIVE | Noted: 2019-06-13

## 2019-06-13 LAB
ABSOLUTE EOS #: <0.03 K/UL (ref 0–0.44)
ABSOLUTE IMMATURE GRANULOCYTE: 0.07 K/UL (ref 0–0.3)
ABSOLUTE LYMPH #: 1.45 K/UL (ref 1.1–3.7)
ABSOLUTE MONO #: 1.49 K/UL (ref 0.1–1.2)
ALBUMIN SERPL-MCNC: 4.7 G/DL (ref 3.5–5.2)
ALBUMIN/GLOBULIN RATIO: 1.4 (ref 1–2.5)
ALP BLD-CCNC: 66 U/L (ref 40–129)
ALT SERPL-CCNC: 16 U/L (ref 5–41)
ANION GAP SERPL CALCULATED.3IONS-SCNC: 21 MMOL/L (ref 9–17)
AST SERPL-CCNC: 28 U/L
BASOPHILS # BLD: 0 % (ref 0–2)
BASOPHILS ABSOLUTE: 0.03 K/UL (ref 0–0.2)
BILIRUB SERPL-MCNC: 0.73 MG/DL (ref 0.3–1.2)
BILIRUBIN DIRECT: 0.17 MG/DL
BILIRUBIN, INDIRECT: 0.56 MG/DL (ref 0–1)
BUN BLDV-MCNC: 23 MG/DL (ref 6–20)
BUN/CREAT BLD: ABNORMAL (ref 9–20)
CALCIUM SERPL-MCNC: 9.5 MG/DL (ref 8.6–10.4)
CHLORIDE BLD-SCNC: 98 MMOL/L (ref 98–107)
CO2: 19 MMOL/L (ref 20–31)
CREAT SERPL-MCNC: 0.98 MG/DL (ref 0.7–1.2)
DIFFERENTIAL TYPE: ABNORMAL
EOSINOPHILS RELATIVE PERCENT: 0 % (ref 1–4)
ETHANOL PERCENT: <0.01 %
ETHANOL: <10 MG/DL
GFR AFRICAN AMERICAN: >60 ML/MIN
GFR NON-AFRICAN AMERICAN: >60 ML/MIN
GFR SERPL CREATININE-BSD FRML MDRD: ABNORMAL ML/MIN/{1.73_M2}
GFR SERPL CREATININE-BSD FRML MDRD: ABNORMAL ML/MIN/{1.73_M2}
GLOBULIN: NORMAL G/DL (ref 1.5–3.8)
GLUCOSE BLD-MCNC: 128 MG/DL (ref 70–99)
HCT VFR BLD CALC: 38.1 % (ref 40.7–50.3)
HCT VFR BLD CALC: 44.9 % (ref 40.7–50.3)
HEMOGLOBIN: 12.1 G/DL (ref 13–17)
HEMOGLOBIN: 14.4 G/DL (ref 13–17)
IMMATURE GRANULOCYTES: 1 %
INR BLD: 0.9
LIPASE: 13 U/L (ref 13–60)
LYMPHOCYTES # BLD: 10 % (ref 24–43)
MCH RBC QN AUTO: 31.3 PG (ref 25.2–33.5)
MCHC RBC AUTO-ENTMCNC: 32.1 G/DL (ref 28.4–34.8)
MCV RBC AUTO: 97.6 FL (ref 82.6–102.9)
MONOCYTES # BLD: 10 % (ref 3–12)
NRBC AUTOMATED: 0 PER 100 WBC
PARTIAL THROMBOPLASTIN TIME: 22.4 SEC (ref 20.5–30.5)
PDW BLD-RTO: 13.1 % (ref 11.8–14.4)
PLATELET # BLD: 272 K/UL (ref 138–453)
PLATELET ESTIMATE: ABNORMAL
PMV BLD AUTO: 11.5 FL (ref 8.1–13.5)
POTASSIUM SERPL-SCNC: 3.5 MMOL/L (ref 3.7–5.3)
PROTHROMBIN TIME: 10.1 SEC (ref 9–12)
RBC # BLD: 4.6 M/UL (ref 4.21–5.77)
RBC # BLD: ABNORMAL 10*6/UL
SEG NEUTROPHILS: 79 % (ref 36–65)
SEGMENTED NEUTROPHILS ABSOLUTE COUNT: 12.22 K/UL (ref 1.5–8.1)
SODIUM BLD-SCNC: 138 MMOL/L (ref 135–144)
TOTAL PROTEIN: 8.1 G/DL (ref 6.4–8.3)
WBC # BLD: 15.3 K/UL (ref 3.5–11.3)
WBC # BLD: ABNORMAL 10*3/UL

## 2019-06-13 PROCEDURE — 6360000002 HC RX W HCPCS: Performed by: FAMILY MEDICINE

## 2019-06-13 PROCEDURE — 36415 COLL VENOUS BLD VENIPUNCTURE: CPT

## 2019-06-13 PROCEDURE — 80076 HEPATIC FUNCTION PANEL: CPT

## 2019-06-13 PROCEDURE — 2500000003 HC RX 250 WO HCPCS: Performed by: PHYSICIAN ASSISTANT

## 2019-06-13 PROCEDURE — 85018 HEMOGLOBIN: CPT

## 2019-06-13 PROCEDURE — 6370000000 HC RX 637 (ALT 250 FOR IP): Performed by: FAMILY MEDICINE

## 2019-06-13 PROCEDURE — 99222 1ST HOSP IP/OBS MODERATE 55: CPT | Performed by: FAMILY MEDICINE

## 2019-06-13 PROCEDURE — 85610 PROTHROMBIN TIME: CPT

## 2019-06-13 PROCEDURE — 85014 HEMATOCRIT: CPT

## 2019-06-13 PROCEDURE — 94762 N-INVAS EAR/PLS OXIMTRY CONT: CPT

## 2019-06-13 PROCEDURE — 2580000003 HC RX 258: Performed by: PHYSICIAN ASSISTANT

## 2019-06-13 PROCEDURE — 1200000000 HC SEMI PRIVATE

## 2019-06-13 PROCEDURE — C9113 INJ PANTOPRAZOLE SODIUM, VIA: HCPCS | Performed by: EMERGENCY MEDICINE

## 2019-06-13 PROCEDURE — 2580000003 HC RX 258: Performed by: FAMILY MEDICINE

## 2019-06-13 PROCEDURE — 96375 TX/PRO/DX INJ NEW DRUG ADDON: CPT

## 2019-06-13 PROCEDURE — 6360000002 HC RX W HCPCS: Performed by: EMERGENCY MEDICINE

## 2019-06-13 PROCEDURE — G0480 DRUG TEST DEF 1-7 CLASSES: HCPCS

## 2019-06-13 PROCEDURE — 2580000003 HC RX 258: Performed by: EMERGENCY MEDICINE

## 2019-06-13 PROCEDURE — 80048 BASIC METABOLIC PNL TOTAL CA: CPT

## 2019-06-13 PROCEDURE — 83690 ASSAY OF LIPASE: CPT

## 2019-06-13 PROCEDURE — 96365 THER/PROPH/DIAG IV INF INIT: CPT

## 2019-06-13 PROCEDURE — APPSS60 APP SPLIT SHARED TIME 46-60 MINUTES: Performed by: PHYSICIAN ASSISTANT

## 2019-06-13 PROCEDURE — 85025 COMPLETE CBC W/AUTO DIFF WBC: CPT

## 2019-06-13 PROCEDURE — 99285 EMERGENCY DEPT VISIT HI MDM: CPT

## 2019-06-13 PROCEDURE — 85730 THROMBOPLASTIN TIME PARTIAL: CPT

## 2019-06-13 PROCEDURE — 6370000000 HC RX 637 (ALT 250 FOR IP): Performed by: PHYSICIAN ASSISTANT

## 2019-06-13 PROCEDURE — 6360000002 HC RX W HCPCS: Performed by: PHYSICIAN ASSISTANT

## 2019-06-13 PROCEDURE — C9113 INJ PANTOPRAZOLE SODIUM, VIA: HCPCS | Performed by: FAMILY MEDICINE

## 2019-06-13 RX ORDER — MULTIVITAMIN WITH FOLIC ACID 400 MCG
1 TABLET ORAL DAILY
Status: DISCONTINUED | OUTPATIENT
Start: 2019-06-13 | End: 2019-06-17 | Stop reason: HOSPADM

## 2019-06-13 RX ORDER — LORAZEPAM 2 MG/ML
1 INJECTION INTRAMUSCULAR EVERY 4 HOURS PRN
Status: DISCONTINUED | OUTPATIENT
Start: 2019-06-13 | End: 2019-06-14

## 2019-06-13 RX ORDER — 0.9 % SODIUM CHLORIDE 0.9 %
1000 INTRAVENOUS SOLUTION INTRAVENOUS ONCE
Status: COMPLETED | OUTPATIENT
Start: 2019-06-13 | End: 2019-06-13

## 2019-06-13 RX ORDER — ONDANSETRON 2 MG/ML
4 INJECTION INTRAMUSCULAR; INTRAVENOUS ONCE
Status: COMPLETED | OUTPATIENT
Start: 2019-06-13 | End: 2019-06-13

## 2019-06-13 RX ORDER — POTASSIUM CHLORIDE 20 MEQ/1
40 TABLET, EXTENDED RELEASE ORAL PRN
Status: DISCONTINUED | OUTPATIENT
Start: 2019-06-13 | End: 2019-06-17 | Stop reason: HOSPADM

## 2019-06-13 RX ORDER — SODIUM CHLORIDE 9 MG/ML
INJECTION, SOLUTION INTRAVENOUS CONTINUOUS
Status: DISCONTINUED | OUTPATIENT
Start: 2019-06-13 | End: 2019-06-16

## 2019-06-13 RX ORDER — BUPROPION HYDROCHLORIDE 150 MG/1
150 TABLET ORAL DAILY
Status: DISCONTINUED | OUTPATIENT
Start: 2019-06-14 | End: 2019-06-17 | Stop reason: HOSPADM

## 2019-06-13 RX ORDER — CHLORDIAZEPOXIDE HYDROCHLORIDE 25 MG/1
25 CAPSULE, GELATIN COATED ORAL 3 TIMES DAILY
Status: DISCONTINUED | OUTPATIENT
Start: 2019-06-13 | End: 2019-06-17

## 2019-06-13 RX ORDER — TRAZODONE HYDROCHLORIDE 50 MG/1
50 TABLET ORAL NIGHTLY PRN
Status: DISCONTINUED | OUTPATIENT
Start: 2019-06-13 | End: 2019-06-17 | Stop reason: HOSPADM

## 2019-06-13 RX ORDER — LORAZEPAM 2 MG/ML
1 INJECTION INTRAMUSCULAR ONCE
Status: COMPLETED | OUTPATIENT
Start: 2019-06-13 | End: 2019-06-13

## 2019-06-13 RX ORDER — LABETALOL HYDROCHLORIDE 5 MG/ML
10 INJECTION, SOLUTION INTRAVENOUS
Status: DISCONTINUED | OUTPATIENT
Start: 2019-06-13 | End: 2019-06-17 | Stop reason: HOSPADM

## 2019-06-13 RX ORDER — NICOTINE 21 MG/24HR
1 PATCH, TRANSDERMAL 24 HOURS TRANSDERMAL DAILY
Status: DISCONTINUED | OUTPATIENT
Start: 2019-06-13 | End: 2019-06-17 | Stop reason: HOSPADM

## 2019-06-13 RX ORDER — PANTOPRAZOLE SODIUM 40 MG/10ML
40 INJECTION, POWDER, LYOPHILIZED, FOR SOLUTION INTRAVENOUS 2 TIMES DAILY
Status: DISCONTINUED | OUTPATIENT
Start: 2019-06-13 | End: 2019-06-15 | Stop reason: CLARIF

## 2019-06-13 RX ORDER — SODIUM CHLORIDE 0.9 % (FLUSH) 0.9 %
10 SYRINGE (ML) INJECTION PRN
Status: DISCONTINUED | OUTPATIENT
Start: 2019-06-13 | End: 2019-06-17 | Stop reason: HOSPADM

## 2019-06-13 RX ORDER — ACETAMINOPHEN 325 MG/1
650 TABLET ORAL EVERY 4 HOURS PRN
Status: DISCONTINUED | OUTPATIENT
Start: 2019-06-13 | End: 2019-06-17 | Stop reason: HOSPADM

## 2019-06-13 RX ORDER — MAGNESIUM HYDROXIDE/ALUMINUM HYDROXICE/SIMETHICONE 120; 1200; 1200 MG/30ML; MG/30ML; MG/30ML
30 SUSPENSION ORAL ONCE
Status: COMPLETED | OUTPATIENT
Start: 2019-06-13 | End: 2019-06-13

## 2019-06-13 RX ORDER — ONDANSETRON 2 MG/ML
4 INJECTION INTRAMUSCULAR; INTRAVENOUS EVERY 6 HOURS PRN
Status: DISCONTINUED | OUTPATIENT
Start: 2019-06-13 | End: 2019-06-17 | Stop reason: HOSPADM

## 2019-06-13 RX ORDER — POTASSIUM CHLORIDE 7.45 MG/ML
10 INJECTION INTRAVENOUS PRN
Status: DISCONTINUED | OUTPATIENT
Start: 2019-06-13 | End: 2019-06-17 | Stop reason: HOSPADM

## 2019-06-13 RX ORDER — LIDOCAINE HYDROCHLORIDE 20 MG/ML
5 SOLUTION OROPHARYNGEAL ONCE
Status: COMPLETED | OUTPATIENT
Start: 2019-06-13 | End: 2019-06-13

## 2019-06-13 RX ORDER — ESCITALOPRAM OXALATE 10 MG/1
20 TABLET ORAL DAILY
Status: DISCONTINUED | OUTPATIENT
Start: 2019-06-13 | End: 2019-06-13

## 2019-06-13 RX ORDER — BUPROPION HYDROCHLORIDE 150 MG/1
150 TABLET ORAL EVERY MORNING
COMMUNITY

## 2019-06-13 RX ORDER — 0.9 % SODIUM CHLORIDE 0.9 %
10 VIAL (ML) INJECTION 2 TIMES DAILY
Status: DISCONTINUED | OUTPATIENT
Start: 2019-06-13 | End: 2019-06-15 | Stop reason: CLARIF

## 2019-06-13 RX ORDER — DOCUSATE SODIUM 100 MG/1
100 CAPSULE, LIQUID FILLED ORAL DAILY PRN
Status: DISCONTINUED | OUTPATIENT
Start: 2019-06-13 | End: 2019-06-17 | Stop reason: HOSPADM

## 2019-06-13 RX ORDER — QUETIAPINE FUMARATE 300 MG/1
300 TABLET, FILM COATED ORAL 2 TIMES DAILY
Status: DISCONTINUED | OUTPATIENT
Start: 2019-06-13 | End: 2019-06-17 | Stop reason: HOSPADM

## 2019-06-13 RX ORDER — SODIUM CHLORIDE 0.9 % (FLUSH) 0.9 %
10 SYRINGE (ML) INJECTION EVERY 12 HOURS SCHEDULED
Status: DISCONTINUED | OUTPATIENT
Start: 2019-06-13 | End: 2019-06-17 | Stop reason: HOSPADM

## 2019-06-13 RX ADMIN — SODIUM CHLORIDE: 9 INJECTION, SOLUTION INTRAVENOUS at 11:11

## 2019-06-13 RX ADMIN — ALUMINUM HYDROXIDE, MAGNESIUM HYDROXIDE, AND SIMETHICONE 30 ML: 200; 200; 20 SUSPENSION ORAL at 15:27

## 2019-06-13 RX ADMIN — QUETIAPINE FUMARATE 300 MG: 300 TABLET ORAL at 22:09

## 2019-06-13 RX ADMIN — PANTOPRAZOLE SODIUM 40 MG: 40 INJECTION, POWDER, LYOPHILIZED, FOR SOLUTION INTRAVENOUS at 22:05

## 2019-06-13 RX ADMIN — SODIUM CHLORIDE 8 MG/HR: 9 INJECTION, SOLUTION INTRAVENOUS at 09:18

## 2019-06-13 RX ADMIN — ENOXAPARIN SODIUM 40 MG: 40 INJECTION, SOLUTION INTRAVENOUS; SUBCUTANEOUS at 11:25

## 2019-06-13 RX ADMIN — Medication 10 ML: at 11:09

## 2019-06-13 RX ADMIN — CHLORDIAZEPOXIDE HYDROCHLORIDE 25 MG: 25 CAPSULE ORAL at 18:04

## 2019-06-13 RX ADMIN — ONDANSETRON 4 MG: 2 INJECTION INTRAMUSCULAR; INTRAVENOUS at 08:04

## 2019-06-13 RX ADMIN — SODIUM CHLORIDE 1000 ML: 9 INJECTION, SOLUTION INTRAVENOUS at 08:10

## 2019-06-13 RX ADMIN — ONDANSETRON 4 MG: 2 INJECTION INTRAMUSCULAR; INTRAVENOUS at 11:06

## 2019-06-13 RX ADMIN — FOLIC ACID: 5 INJECTION, SOLUTION INTRAMUSCULAR; INTRAVENOUS; SUBCUTANEOUS at 12:11

## 2019-06-13 RX ADMIN — ONDANSETRON 4 MG: 2 INJECTION INTRAMUSCULAR; INTRAVENOUS at 18:54

## 2019-06-13 RX ADMIN — LORAZEPAM 1 MG: 2 INJECTION INTRAMUSCULAR; INTRAVENOUS at 08:04

## 2019-06-13 RX ADMIN — LORAZEPAM 1 MG: 2 INJECTION INTRAMUSCULAR; INTRAVENOUS at 19:01

## 2019-06-13 RX ADMIN — LORAZEPAM 1 MG: 2 INJECTION INTRAMUSCULAR; INTRAVENOUS at 11:35

## 2019-06-13 RX ADMIN — LIDOCAINE HYDROCHLORIDE 5 ML: 20 SOLUTION ORAL; TOPICAL at 15:27

## 2019-06-13 RX ADMIN — PANTOPRAZOLE SODIUM 80 MG: 40 INJECTION, POWDER, FOR SOLUTION INTRAVENOUS at 08:55

## 2019-06-13 RX ADMIN — SODIUM CHLORIDE 10 ML: 9 INJECTION INTRAMUSCULAR; INTRAVENOUS; SUBCUTANEOUS at 22:09

## 2019-06-13 ASSESSMENT — ENCOUNTER SYMPTOMS
EYE PAIN: 0
BACK PAIN: 0
COLOR CHANGE: 0
VOMITING: 1
DIARRHEA: 0
CHEST TIGHTNESS: 0
ABDOMINAL DISTENTION: 0
SHORTNESS OF BREATH: 0
BLOOD IN STOOL: 0
RHINORRHEA: 0
NAUSEA: 1
ABDOMINAL PAIN: 1
TROUBLE SWALLOWING: 1
COUGH: 0
WHEEZING: 0
ABDOMINAL PAIN: 0
CONSTIPATION: 0

## 2019-06-13 ASSESSMENT — PAIN DESCRIPTION - LOCATION: LOCATION: OTHER (COMMENT)

## 2019-06-13 ASSESSMENT — PAIN SCALES - GENERAL
PAINLEVEL_OUTOF10: 0
PAINLEVEL_OUTOF10: 10

## 2019-06-13 ASSESSMENT — PAIN DESCRIPTION - DESCRIPTORS: DESCRIPTORS: BURNING

## 2019-06-13 ASSESSMENT — PAIN DESCRIPTION - FREQUENCY: FREQUENCY: CONTINUOUS

## 2019-06-13 NOTE — CARE COORDINATION
Case Management Initial Discharge Plan  Serge Damon,             Met with:patient to discuss discharge plans. Information verified: address, contacts, phone number, , insurance:  States he is currently staying at the [facility]  PCP: . None (Inactive)  Date of last visit:     Insurance Provider: Karsten    Discharge Planning    Living Arrangements:  Other (Comment)   Support Systems:  Other (Comment)    Home: independent    Patient able to perform ADL's:Independent    Current Services (outpatient & in home) none  DME equipment:   DME provider:     Pharmacy: [facility] [address] Medications:     Does patient want to participate in local refill/ meds to beds program?       Potential Assistance Needed:       Patient agreeable to home care: No  [place] of choice provided:  n/a    Prior SNF/Rehab Placement and Facility:   Agreeable to SNF/Rehab: No  [place] of choice provided: n/a   Evaluation: Yes    Expected Discharge date:     Patient expects to be discharged to: Follow Up Appointment: Best Day/ Time:      Transportation provider: Audra Dayton arrangements needed for discharge: Yes    Readmission Risk              Risk of Unplanned Readmission:        0             Does patient have a readmission risk score greater than 14?: not calculated. If yes, follow-up appointment must be made within 7 days of discharge.      Discharge Plan: return to [facility] signed by Weston Yee RN on 19 at 10:52 AM

## 2019-06-13 NOTE — ED PROVIDER NOTES
[facility]  Emergency Department Encounter  Emergency Medicine Resident     Pt Name: Lucretia Pallas  MRN: [id number]  [place] 1970  Date ofevaluation: 6/13/19  PCP:  . None (Inactive)    CHIEF COMPLAINT       Chief Complaint   Patient presents with    Withdrawal     Withdrawal symptoms. no drink for past 3 days       HISTORY OF PRESENT ILLNESS  (Location/Symptom, Timing/Onset, Context/Setting, Quality, Duration, Modifying Factors, Severity, Associated signs/symptoms)     Lucretia Pallas is a 47 y.o. male who presents with complaints of withdrawal symptoms as well as upper abdominal pain associated with nausea and vomiting and several episodes of hematemesis. Patient states that he has been vomiting bright red blood. States that he has a history of varices. States that when he withdraws from alcohol he does have seizures. States that normally he drinks 1/5 of vodka daily and last drink was 3 days ago. EGD done on 5/30/19:  1-Mild LA grade A esophagitis, inactive and healing. No esophageal varices, no MWT. Small type 1 Hiatal hernia, 1 cm in length, no main erosions. 2-Mild non-specific gastritis, cold biopsy taken for H. Pylori. No ulcerations, no erosions, no residual blood, no obvious sources of hemorrhage or anemia. 3-Mild duodenitis, no ulcerations, no erosions, no sources of hemorrhage or anemia identified    PAST MEDICAL / SURGICAL / SOCIAL / FAMILY HISTORY      has a past medical history of Alcohol abuse, GI bleed, Hematemesis, Hypertension, Intractable vomiting, and Suicidal behavior. has a past surgical history that includes hernia repair; Upper gastrointestinal endoscopy (6-5-14); Inguinal hernia repair (05/2019); hernia repair (Right, 5/16/2019); and Upper gastrointestinal endoscopy (N/A, 5/30/2019).     Social History     Socioeconomic History    Marital status:      Spouse name: Not on file    Number of children: Not on file    Years of education: Not on 150 mg by mouth every morning   Yes Historical Provider, MD   pantoprazole (PROTONIX) 40 MG tablet Take 1 tablet by mouth every morning (before breakfast) 5/30/19  Yes Carolina Hope DO   ondansetron (ZOFRAN) 4 MG tablet Take 1 tablet by mouth every 8 hours as needed for Nausea or Vomiting 5/16/19  Yes Governor Raymundo DO   docusate sodium (COLACE) 100 MG capsule Take 1 capsule by mouth daily as needed for Constipation 5/16/19 6/15/19 Yes Governor Raymundo DO   traZODone (DESYREL) 50 MG tablet Take 1 tablet by mouth nightly as needed for Sleep 4/15/19  Yes Salvador Henning MD   QUEtiapine (SEROQUEL) 300 MG tablet Take 1 tablet by mouth nightly  Patient taking differently: Take 300 mg by mouth 2 times daily  4/15/19  Yes Salvador Henning MD   Multiple Vitamin (MULTIVITAMIN) tablet Take 1 tablet by mouth daily 5/30/19   Jermain Faust,        REVIEW OF SYSTEMS    (2-9 systems for level 4, 10 or more for level 5)      Review of Systems   Constitutional: Negative for chills and fever. HENT: Negative for congestion and rhinorrhea. Eyes: Negative for visual disturbance. Respiratory: Negative for shortness of breath. Cardiovascular: Negative for chest pain. Gastrointestinal: Positive for abdominal pain, nausea and vomiting. Genitourinary: Negative for dysuria and frequency. Musculoskeletal: Negative for arthralgias. Skin: Negative for wound. Neurological: Negative for dizziness and light-headedness. PHYSICAL EXAM   (up to 7 for level 4, 8 or more for level 5)      INITIAL VITALS:   /70   Pulse 93   Temp 98.8 °F (37.1 °C)   Resp 16   Ht 5' 9\" (1.753 m)   Wt 130 lb (59 kg)   SpO2 97%   BMI 19.20 kg/m²     Physical Exam   Constitutional: He is oriented to person, place, and time. No distress. HENT:   Head: Normocephalic and atraumatic. Eyes: Right eye exhibits no discharge. Left eye exhibits no discharge. Cardiovascular: Normal rate, regular rhythm and normal heart sounds.  Exam reveals no friction rub. No murmur heard. Pulmonary/Chest: Effort normal and breath sounds normal. No stridor. No respiratory distress. He has no wheezes. He has no rales. He exhibits no tenderness. Abdominal: Soft. He exhibits no distension. There is tenderness in the epigastric area. There is no guarding. Neurological: He is alert and oriented to person, place, and time. Skin: Skin is warm and dry. He is not diaphoretic. Nursing note and vitals reviewed.     DIAGNOSTICS     PLAN (LABS / IMAGING / EKG):  Orders Placed This Encounter   Procedures    CBC Auto Differential    Basic Metabolic Panel    Ethanol    Protime-INR    APTT    Hepatic Function Panel    Lipase    Comprehensive Metabolic Panel w/ Reflex to MG    Magnesium    Calcium, Ionized    CBC    Phosphorus    TSH without Reflex    Protime-INR    Diet NPO Effective Now    Vital signs per unit routine    Telemetry monitoring    Notify physician    Notify physician    Bedrest with bathroom privileges    Daily weights    Intake and output    Elevate Head of Bed     Place intermittent pneumatic compression device    Full Code    Inpatient consult to Hospitalist    Inpatient consult to Dietitian    Inpatient consult to Social Work    Inpatient consult to Spiritual Services    OT eval and treat    PT evaluation and treat    Initiate Oxygen Therapy Protocol    Pulse oximetry, continuous    EKG 12 lead    PATIENT STATUS (FROM ED OR OR/PROCEDURAL) Inpatient    Alcohol and or drug assessment    Seizure precautions    Fall precautions       MEDICATIONS ORDERED:  Orders Placed This Encounter   Medications    LORazepam (ATIVAN) injection 1 mg    0.9 % sodium chloride bolus    pantoprazole (PROTONIX) 80 mg in sodium chloride 0.9 % 50 mL bolus    pantoprazole (PROTONIX) 80 mg in sodium chloride 0.9 % 100 mL infusion    ondansetron (ZOFRAN) injection 4 mg    docusate sodium (COLACE) capsule 100 mg    DISCONTD: escitalopram (LEXAPRO) tablet 20 mg    multivitamin 1 tablet    traZODone (DESYREL) tablet 50 mg    0.9 % sodium chloride infusion    sodium chloride flush 0.9 % injection 10 mL    sodium chloride flush 0.9 % injection 10 mL    sodium chloride 0.9 % 0,989 mL with folic acid 1 mg, adult multi-vitamin with vitamin k 10 mL, thiamine 100 mg    OR Linked Order Group     potassium chloride (KLOR-CON M) extended release tablet 40 mEq     potassium bicarb-citric acid (EFFER-K) effervescent tablet 40 mEq     potassium chloride 10 mEq/100 mL IVPB (Peripheral Line)    ondansetron (ZOFRAN) injection 4 mg    nicotine (NICODERM CQ) 21 MG/24HR 1 patch     As needed if a smoker and requests Nicoderm patch    enoxaparin (LOVENOX) injection 40 mg    acetaminophen (TYLENOL) tablet 650 mg    LORazepam (ATIVAN) injection 1 mg    chlordiazePOXIDE (LIBRIUM) capsule 25 mg    labetalol (NORMODYNE;TRANDATE) injection 10 mg    QUEtiapine (SEROQUEL) tablet 300 mg    buPROPion (WELLBUTRIN XL) extended release tablet 150 mg    DISCONTD: aluminum & magnesium hydroxide-simethicone (MAALOX) 30 mL, lidocaine viscous hcl (XYLOCAINE) 5 mL (GI COCKTAIL)    AND Linked Order Group     aluminum & magnesium hydroxide-simethicone (MAALOX) 200-200-20 MG/5ML suspension 30 mL     lidocaine viscous hcl (XYLOCAINE) 2 % solution 5 mL       DIAGNOSTIC RESULTS / EMERGENCYDEPARTMENT COURSE / MDM     LABS:  Results for orders placed or performed during the hospital encounter of 06/13/19   CBC Auto Differential   Result Value Ref Range    WBC 15.3 (H) 3.5 - 11.3 k/uL    RBC 4.60 4.21 - 5.77 m/uL    Hemoglobin 14.4 13.0 - 17.0 g/dL    Hematocrit 44.9 40.7 - 50.3 %    MCV 97.6 82.6 - 102.9 fL    MCH 31.3 25.2 - 33.5 pg    MCHC 32.1 28.4 - 34.8 g/dL    RDW 13.1 11.8 - 14.4 %    Platelets 905 530 - 773 k/uL    MPV 11.5 8.1 - 13.5 fL    NRBC Automated 0.0 0.0 per 100 WBC    Differential Type NOT REPORTED     Seg Neutrophils 79 (H) 36 - 65 % Lymphocytes 10 (L) 24 - 43 %    Monocytes 10 3 - 12 %    Eosinophils % 0 (L) 1 - 4 %    Basophils 0 0 - 2 %    Immature Granulocytes 1 (H) 0 %    Segs Absolute 12.22 (H) 1.50 - 8.10 k/uL    Absolute Lymph # 1.45 1.10 - 3.70 k/uL    Absolute Mono # 1.49 (H) 0.10 - 1.20 k/uL    Absolute Eos # <0.03 0.00 - 0.44 k/uL    Basophils # 0.03 0.00 - 0.20 k/uL    Absolute Immature Granulocyte 0.07 0.00 - 0.30 k/uL    WBC Morphology NOT REPORTED     RBC Morphology NOT REPORTED     Platelet Estimate NOT REPORTED    Basic Metabolic Panel   Result Value Ref Range    Glucose 128 (H) 70 - 99 mg/dL    BUN 23 (H) 6 - 20 mg/dL    CREATININE 0.98 0.70 - 1.20 mg/dL    Bun/Cre Ratio NOT REPORTED 9 - 20    Calcium 9.5 8.6 - 10.4 mg/dL    Sodium 138 135 - 144 mmol/L    Potassium 3.5 (L) 3.7 - 5.3 mmol/L    Chloride 98 98 - 107 mmol/L    CO2 19 (L) 20 - 31 mmol/L    Anion Gap 21 (H) 9 - 17 mmol/L    GFR Non-African American >60 >60 mL/min    GFR African American >60 >60 mL/min    GFR Comment          GFR Staging NOT REPORTED    Ethanol   Result Value Ref Range    Ethanol <10 <10 mg/dL    Ethanol percent <0.010 <0.010 %   Protime-INR   Result Value Ref Range    Protime 10.1 9.0 - 12.0 sec    INR 0.9    APTT   Result Value Ref Range    PTT 22.4 20.5 - 30.5 sec   Hepatic Function Panel   Result Value Ref Range    Alb 4.7 3.5 - 5.2 g/dL    Alkaline Phosphatase 66 40 - 129 U/L    ALT 16 5 - 41 U/L    AST 28 <40 U/L    Total Bilirubin 0.73 0.3 - 1.2 mg/dL    Bilirubin, Direct 0.17 <0.31 mg/dL    Bilirubin, Indirect 0.56 0.00 - 1.00 mg/dL    Total Protein 8.1 6.4 - 8.3 g/dL    Globulin NOT REPORTED 1.5 - 3.8 g/dL    Albumin/Globulin Ratio 1.4 1.0 - 2.5   Lipase   Result Value Ref Range    Lipase 13 13 - 60 U/L       EMERGENCY DEPARTMENT COURSE:    ED Course as of Jun 13 1458   Thu Jun 13, 2019   0979 Discussed with Jane Patel of [facility] who will admit patient and placing bridging orders.     [JN]      ED Course User Index  [JN] Mani Israel MD MDM: With complaints of alcohol withdrawal.  Patient does not have esophageal varices on most recent EGD. He was started on Protonix drip. Will be admitted to [facility] for further work-up and management. PROCEDURES:  None    CONSULTS:  IP CONSULT TO HOSPITALIST  IP CONSULT TO DIETITIAN  IP CONSULT TO SOCIAL WORK  IP CONSULT TO SPIRITUAL SERVICES    FINAL IMPRESSION      1. Alcohol withdrawal syndrome without complication ([address]    2. Hematemesis with nausea          DISPOSITION / PLAN     DISPOSITION  admitted      PATIENT REFERRED TO:  .  None            DISCHARGE MEDICATIONS:  Current Discharge Medication List          Jani Stafford MD  Emergency Medicine Resident  [facility]    (Please note that portions of this note were completed with a voice recognition program.  Efforts were made to edit thedictations but occasionally words are mis-transcribed.)       Jani Stafford MD  Resident  06/13/19 3416

## 2019-06-13 NOTE — ED NOTES
Pt in room continuously sticking fingers down throat making himself vomit. He says that it makes the pain go away.  Pt was advised not to do that      Frida Mack RN  06/13/19 [id number]

## 2019-06-13 NOTE — ED NOTES
Patient in to the ED with complaints of alcohol withdrawal and states he has not drank in 3 days. Patient consumes a fifth of vodka daily, per his report. Patient seen in the ED on 5/28/19 and given resources for sobriety. Patient is a medical admit for detox and will need follow-up for treatment. DARIUS updated Danielle, in-patient DARIUS, regarding case. Blanca Ok.  Arnoldo Mackay  06/13/19 [id number]

## 2019-06-13 NOTE — PROGRESS NOTES
Nutrition Assessment    Type and Reason for Visit: Initial, Consult(EtoH)    Nutrition Recommendations: Continue NPO status. Start diet and add magic cup supplements TID as medically able. Please obtain actual weights as able . Nutrition Assessment:  RD consulted for [facility] abuse. Pt nutritionally compromised aeb moderate malnutrition. Pt stated that his appetite has been poor since admission. Pt stated UBW of 130 lbs and denies any recent wt loss/changes - no wt hx in EMR. Pt denied ensure supplements but is willing to try magic cup supplements when NPO diet advances. Will monitor nutrition progression. Malnutrition Assessment:  · Malnutrition Status: Meets the criteria for moderate malnutrition  · Context: Social or environmental circumstances  · Findings of the 6 clinical characteristics of malnutrition (Minimum of 2 out of 6 clinical characteristics is required to make the diagnosis of moderate or severe Protein Calorie Malnutrition based on AND/ASPEN Guidelines):  1. Energy Intake-Less than or equal to 75% of estimated energy requirement, Unable to assess    2. Weight Loss-No significant weight loss,    3. Fat Loss-Mild subcutaneous fat loss, Orbital, Triceps  4. Muscle Loss-Mild muscle mass loss, Temples (temporalis muscle), Clavicles (pectoralis and deltoids)  5. Fluid Accumulation-No significant fluid accumulation,    6.  Strength-     Nutrition Risk Level: Moderate    Nutrient Needs:  · Estimated Daily Total Kcal: 28-32 kcal/kg ~>6166-1562 kcals/d   · Estimated Daily Protein (g): 1.2-1.4 gm/kg ~>71-83 gms/d     Nutrition Diagnosis:   · Problem:  Moderate malnutrition, In context of social or environmental circumstances  · Etiology: related to Insufficient energy/nutrient consumption(EtoH abuse )     Signs and symptoms:  as evidenced by Mild loss of subcutaneous fat, Mild muscle loss, Patient report of, Diet history of poor intake    Objective Information:  · Wound Type: None  · Current Nutrition Therapies:  · Oral Diet Orders: NPO   · Anthropometric Measures:  · Ht: 5' 9\" (175.3 cm)   · Current Body Wt: 130 lb (59 kg)(stated)  · Admission Body Wt: 130 lb (59 kg)(stated)  · Usual Body Wt: 130 lb (59 kg)(per pt)  · % Weight Change:  ,  No wt hx in EMR - only stated weights   · Ideal Body Wt: 160 lb (72.6 kg), % Ideal Body 81% (adm/ideal)  · BMI Classification: BMI 18.5 - 24.9 Normal Weight    Nutrition Interventions:   Continue NPO(Start diet and add magic cup supplements TID as able )  Continued Inpatient Monitoring, Education Not Indicated    Nutrition Evaluation:   · Evaluation: Goals set   · Goals: Initiate diet within 24-72 hrs     · Monitoring: Nutrition Progression, I&O, Weight, Pertinent Labs, Monitor Bowel Function      Electronically signed by Deanna Alejandro RD, LD on 6/13/19 at 2:31 PM    Contact Number: [phone]

## 2019-06-13 NOTE — PROGRESS NOTES
Physical Therapy  DATE: 2019    NAME: Genie Peterson  MRN: [id number]   : 1970    Patient not seen this date for Physical Therapy due to:  [] Blood transfusion in progress  [] Hemodialysis  [x]  Patient Declined  [] Spine Precautions   [] Strict Bedrest  [] Surgery/ Procedure  [] Testing      [] Other        [] PT being discontinued at this time. Patient independent. No further needs. [] PT being discontinued at this time as the patient has been transferred to palliative care. No further needs.     Albert Teixeira, PT

## 2019-06-13 NOTE — ED PROVIDER NOTES
[address] [facility]     Emergency Department     Faculty Attestation    I performed a history and physical examination of the patient and discussed management with the resident. I have reviewed and agree with the residents findings including all diagnostic interpretations, and treatment plans as written at the time of my review. Any areas of disagreement are noted on the chart. I was personally present for the key portions of any procedures. I have documented in the chart those procedures where I was not present during the key portions. For Physician Assistant/ Nurse Practitioner cases/documentation I have personally evaluated this patient and have completed at least one if not all key elements of the E/M (history, physical exam, and MDM). Additional findings are as noted. Primary Care Physician: . None (Inactive)    History: This is a 47 y.o. male who presents to the Emergency Department with complaint of nausea vomiting. Patient also complains of abdominal pain. Patient states that he quit drinking 3 days ago and is a chronic alcoholic. The patient also states that he has had issues with withdrawal seizures in the past.  The patient denies any chest pain or shortness of breath. Physical:   height is 5' 9\" (1.753 m) and weight is 130 lb (59 kg). His oral temperature is 98.6 °F (37 °C). His blood pressure is 164/106 (abnormal) and his pulse is 103. His respiration is 24 and oxygen saturation is 100%.   Lungs are clear to auscultation bilateral, heart tachycardic with a regular rhythm, abdomen is soft bowel sounds are present is tender in the epigastric area no pulsatile abdominal mass    Impression: Nausea vomiting abdominal pain, alcohol withdrawal    Plan: IV fluids, Ativan, CBC, CMP, lipase, lactic acid, urinalysis    (Please note that portions of this note were completed with a voice recognition program.  Efforts were made to edit the dictations but occasionally words are mis-transcribed.)    Amarjit Robbins.  Tina Castro MD, [address] [facility]  Attending Emergency Medicine Physician        Nova Babin MD  06/13/19 4813

## 2019-06-13 NOTE — CARE COORDINATION
Consult received from ED SW to f/u re: alcohol rehab after detox  Pt with daily alcohol use, 1/5 of vodka  Pt denies drug use  Pt  stated that he had been at [facility] (6-9m residential tx program for substance abuse) for one day prior to admission  Pt stated they know he is here and did not need writer to contact them  Stated he plans to return at discharge  Stated he has an [employer] stated he is linked with [facility] for depression  Stated he see's the psychiatrist once a month and has his next appt the end of the month  Pt stated he believes someone from [facility] will pick him up at discharge, if not, will cab him.

## 2019-06-13 NOTE — H&P
[facility]    HISTORY AND PHYSICAL EXAMINATION            Date:   6/13/2019  Patient name:  Sara Solano  Date of admission:  6/13/2019  7:52 AM  MRN:   [id number]  Account:  [de-identified]  YOB: 1970  PCP:    . None (Inactive)  Room:   2474/0009-10  Code Status:    Full Code    Chief Complaint:     Chief Complaint   Patient presents with    Withdrawal     Withdrawal symptoms. no drink for past 3 days       History Obtained From:     patient, electronic medical record    History of Present Illness: The patient is a 47year-old male with a history of alcohol dependence and previous seizures associated with alcohol withdrawal. Most recently he was admitted from 5/28/2019 - 5/30/2019 for alcohol withdrawal and hematemesis and underwent EGD, which was significant for gastritis. He signed out [place] on 5/30 and has since been living at a rehabilitation facility ([facility]). He reports that he last had a fifth of alcohol 3 days ago and started experiencing withdrawal symptoms within 24 hours of cessation. This morning he had worsening tremors, diaphoresis, vomiting, and anxiety prompting him to seek medical attention. ED evaluation was essentially unremarkable. Hemoglobin normal. Ethanol undetectable. Potassium 3.5 otherwise no significant electrolyte abnormalities. Admitted to medicine for management of his withdrawal.        Past Medical History:     Past Medical History:   Diagnosis Date    Alcohol abuse 6/5/2014    GI bleed 6/5/2014    Hematemesis 6/5/2014    Hypertension     Per patient BP only increases when he drinks alcohol.     Intractable vomiting 6/5/2014    Suicidal behavior 12/13/2018        Past Surgical History:     Past Surgical History:   Procedure Laterality Date    HERNIA REPAIR      left    HERNIA REPAIR Right 5/16/2019    XI ROBOTIC LAPAROSCOPIC  INGUINAL HERNIA REPAIR WITH MESH, POSSIBLE BILATERAL, Grandmother     No Known Problems Paternal Grandfather     No Known Problems Daughter     Depression Daughter     No Known Problems Son        Review of Systems:     Positive and Negative as described in HPI. Review of Systems   Constitutional: Positive for chills, diaphoresis and fatigue. Negative for fever. HENT: Positive for trouble swallowing. Eyes: Negative for pain and visual disturbance. Respiratory: Negative for cough, chest tightness, shortness of breath and wheezing. Cardiovascular: Negative for chest pain, palpitations and leg swelling. Gastrointestinal: Positive for nausea and vomiting. Negative for abdominal distention, abdominal pain, blood in stool, constipation and diarrhea. Genitourinary: Negative for dysuria and hematuria. Musculoskeletal: Negative for arthralgias, back pain, joint swelling and myalgias. Skin: Negative for color change, pallor, rash and wound. Neurological: Positive for tremors and seizures. Negative for dizziness, light-headedness, numbness and headaches. Physical Exam:   BP (!) 141/83   Pulse 96   Temp 97.8 °F (36.6 °C) (Oral)   Resp 28   Ht 5' 9\" (1.753 m)   Wt 130 lb (59 kg)   SpO2 100%   BMI 19.20 kg/m²   Temp (24hrs), Av.3 °F (36.8 °C), Min:97.8 °F (36.6 °C), Max:98.6 °F (37 °C)    No results for input(s): POCGLU in the last 72 hours. No intake or output data in the 24 hours ending 19 1409    General Appearance:  Alert, lying in bed in no apparent distress  Mental status: oriented to person, place, and time with normal affect  Head:  normocephalic, atraumatic.   Eye: no icterus, redness, pupils equal and reactive, extraocular eye movements intact, conjunctiva clear  Ear: normal external ear, no discharge, hearing intact  Nose:  no drainage noted  Mouth: mucous membranes moist  Neck: supple, thyroid not palpable  Lungs: Bilateral equal air entry, clear to ausculation, no wheezing, rales or rhonchi, normal effort  Cardiovascular: normal rate, regular rhythm, no murmur, gallop, rub.   Abdomen: Soft, nontender, nondistended, normal bowel sounds, no hepatomegaly or splenomegaly  Neurologic: There are no new focal motor or sensory deficits, normal muscle tone and bulk, no abnormal sensation, normal speech, cranial nerves II through XII grossly intact  Skin: No gross lesions, rashes, bruising or bleeding on exposed skin area  Extremities:  peripheral pulses palpable, no pedal edema or calf pain with palpation  Psych: flat affect    Investigations:      Laboratory Testing:  Recent Results (from the past 24 hour(s))   CBC Auto Differential    Collection Time: 06/13/19  8:16 AM   Result Value Ref Range    WBC 15.3 (H) 3.5 - 11.3 k/uL    RBC 4.60 4.21 - 5.77 m/uL    Hemoglobin 14.4 13.0 - 17.0 g/dL    Hematocrit 44.9 40.7 - 50.3 %    MCV 97.6 82.6 - 102.9 fL    MCH 31.3 25.2 - 33.5 pg    MCHC 32.1 28.4 - 34.8 g/dL    RDW 13.1 11.8 - 14.4 %    Platelets 370 525 - 934 k/uL    MPV 11.5 8.1 - 13.5 fL    NRBC Automated 0.0 0.0 per 100 WBC    Differential Type NOT REPORTED     Seg Neutrophils 79 (H) 36 - 65 %    Lymphocytes 10 (L) 24 - 43 %    Monocytes 10 3 - 12 %    Eosinophils % 0 (L) 1 - 4 %    Basophils 0 0 - 2 %    Immature Granulocytes 1 (H) 0 %    Segs Absolute 12.22 (H) 1.50 - 8.10 k/uL    Absolute Lymph # 1.45 1.10 - 3.70 k/uL    Absolute Mono # 1.49 (H) 0.10 - 1.20 k/uL    Absolute Eos # <0.03 0.00 - 0.44 k/uL    Basophils # 0.03 0.00 - 0.20 k/uL    Absolute Immature Granulocyte 0.07 0.00 - 0.30 k/uL    WBC Morphology NOT REPORTED     RBC Morphology NOT REPORTED     Platelet Estimate NOT REPORTED    Basic Metabolic Panel    Collection Time: 06/13/19  8:16 AM   Result Value Ref Range    Glucose 128 (H) 70 - 99 mg/dL    BUN 23 (H) 6 - 20 mg/dL    CREATININE 0.98 0.70 - 1.20 mg/dL    Bun/Cre Ratio NOT REPORTED 9 - 20    Calcium 9.5 8.6 - 10.4 mg/dL    Sodium 138 135 - 144 mmol/L    Potassium 3.5 (L) 3.7 - 5.3 mmol/L Chloride 98 98 - 107 mmol/L    CO2 19 (L) 20 - 31 mmol/L    Anion Gap 21 (H) 9 - 17 mmol/L    GFR Non-African American >60 >60 mL/min    GFR African American >60 >60 mL/min    GFR Comment          GFR Staging NOT REPORTED    Ethanol    Collection Time: 06/13/19  8:16 AM   Result Value Ref Range    Ethanol <10 <10 mg/dL    Ethanol percent <0.010 <0.010 %   Protime-INR    Collection Time: 06/13/19  8:16 AM   Result Value Ref Range    Protime 10.1 9.0 - 12.0 sec    INR 0.9    APTT    Collection Time: 06/13/19  8:16 AM   Result Value Ref Range    PTT 22.4 20.5 - 30.5 sec   Hepatic Function Panel    Collection Time: 06/13/19  8:16 AM   Result Value Ref Range    Alb 4.7 3.5 - 5.2 g/dL    Alkaline Phosphatase 66 40 - 129 U/L    ALT 16 5 - 41 U/L    AST 28 <40 U/L    Total Bilirubin 0.73 0.3 - 1.2 mg/dL    Bilirubin, Direct 0.17 <0.31 mg/dL    Bilirubin, Indirect 0.56 0.00 - 1.00 mg/dL    Total Protein 8.1 6.4 - 8.3 g/dL    Globulin NOT REPORTED 1.5 - 3.8 g/dL    Albumin/Globulin Ratio 1.4 1.0 - 2.5   Lipase    Collection Time: 06/13/19  8:16 AM   Result Value Ref Range    Lipase 13 13 - 60 U/L       Imaging/Diagnostics:    No results found.     Assessment :      Primary Problem  Alcohol withdrawal syndrome, uncomplicated [facility])    Active Hospital Problems    Diagnosis Date Noted    Anxiety [F41.9] 06/11/2014     Priority: High     Class: Acute    Essential hypertension [I10]      Priority: High     Class: Chronic    Gastritis [K29.70] 06/07/2014     Priority: High     Class: Present on Admission    Alcohol withdrawal syndrome, uncomplicated ([facility] [address] [S58.056] 06/13/2019    Severe major depression [facility] psychotic features [facility] [F32.2] 12/15/2018    Hypokalemia [E87.6] 06/07/2014       Plan:     Patient status Admit as inpatient in the  Med/Surge    CIWA protocol  IV fluid resuscitation  IV thiamine, folic acid  Blood pressure control  Antiemetics  Correct electrolyte abnormalities  Librium TID  Resume home psych

## 2019-06-13 NOTE — PROGRESS NOTES
Smoking Cessation - topics covered   []  Health Risks  []  Benefits of Quitting   []  Smoking Cessation  [x]  Patient has no history of tobacco use  []  Patient is former smoker. [x]  No need for tobacco cessation education. []  Booklet given  []  Patient verbalizes understanding. []  Patient denies need for tobacco cessation education. []  Unable to meet with patient today. Will follow up as able.   Yony Fuller  10:58 AM

## 2019-06-13 NOTE — ED NOTES
Bed: 16  Expected date:   Expected time:   Means of arrival:   Comments:  FREDDIE [id number] Jennifer Canada RN  06/13/19 019

## 2019-06-14 LAB
ALBUMIN SERPL-MCNC: 3 G/DL (ref 3.5–5.2)
ALBUMIN/GLOBULIN RATIO: 1.4 (ref 1–2.5)
ALP BLD-CCNC: 41 U/L (ref 40–129)
ALT SERPL-CCNC: 8 U/L (ref 5–41)
ANION GAP SERPL CALCULATED.3IONS-SCNC: 9 MMOL/L (ref 9–17)
AST SERPL-CCNC: 12 U/L
BILIRUB SERPL-MCNC: 0.48 MG/DL (ref 0.3–1.2)
BUN BLDV-MCNC: 11 MG/DL (ref 6–20)
BUN/CREAT BLD: ABNORMAL (ref 9–20)
CALCIUM IONIZED: 1.05 MMOL/L (ref 1.13–1.33)
CALCIUM SERPL-MCNC: 7.1 MG/DL (ref 8.6–10.4)
CHLORIDE BLD-SCNC: 109 MMOL/L (ref 98–107)
CO2: 20 MMOL/L (ref 20–31)
CREAT SERPL-MCNC: 0.56 MG/DL (ref 0.7–1.2)
GFR AFRICAN AMERICAN: >60 ML/MIN
GFR NON-AFRICAN AMERICAN: >60 ML/MIN
GFR SERPL CREATININE-BSD FRML MDRD: ABNORMAL ML/MIN/{1.73_M2}
GFR SERPL CREATININE-BSD FRML MDRD: ABNORMAL ML/MIN/{1.73_M2}
GLUCOSE BLD-MCNC: 87 MG/DL (ref 70–99)
HCT VFR BLD CALC: 32.6 % (ref 40.7–50.3)
HCT VFR BLD CALC: 39.7 % (ref 40.7–50.3)
HEMOGLOBIN: 10.1 G/DL (ref 13–17)
HEMOGLOBIN: 12.7 G/DL (ref 13–17)
INR BLD: 0.9
MAGNESIUM: 2.2 MG/DL (ref 1.6–2.6)
MCH RBC QN AUTO: 31.2 PG (ref 25.2–33.5)
MCHC RBC AUTO-ENTMCNC: 31 G/DL (ref 28.4–34.8)
MCV RBC AUTO: 100.6 FL (ref 82.6–102.9)
NRBC AUTOMATED: 0 PER 100 WBC
PDW BLD-RTO: 12.8 % (ref 11.8–14.4)
PHOSPHORUS: 1.9 MG/DL (ref 2.5–4.5)
PLATELET # BLD: 151 K/UL (ref 138–453)
PMV BLD AUTO: 11 FL (ref 8.1–13.5)
POTASSIUM SERPL-SCNC: 3.5 MMOL/L (ref 3.7–5.3)
PROTHROMBIN TIME: 9.9 SEC (ref 9–12)
RBC # BLD: 3.24 M/UL (ref 4.21–5.77)
SODIUM BLD-SCNC: 138 MMOL/L (ref 135–144)
TOTAL PROTEIN: 5.1 G/DL (ref 6.4–8.3)
TSH SERPL DL<=0.05 MIU/L-ACNC: 1.14 MIU/L (ref 0.3–5)
WBC # BLD: 6.3 K/UL (ref 3.5–11.3)

## 2019-06-14 PROCEDURE — 36415 COLL VENOUS BLD VENIPUNCTURE: CPT

## 2019-06-14 PROCEDURE — 85027 COMPLETE CBC AUTOMATED: CPT

## 2019-06-14 PROCEDURE — 2580000003 HC RX 258: Performed by: FAMILY MEDICINE

## 2019-06-14 PROCEDURE — 6370000000 HC RX 637 (ALT 250 FOR IP): Performed by: FAMILY MEDICINE

## 2019-06-14 PROCEDURE — 83735 ASSAY OF MAGNESIUM: CPT

## 2019-06-14 PROCEDURE — 6370000000 HC RX 637 (ALT 250 FOR IP): Performed by: PHYSICIAN ASSISTANT

## 2019-06-14 PROCEDURE — 6360000002 HC RX W HCPCS: Performed by: INTERNAL MEDICINE

## 2019-06-14 PROCEDURE — 2580000003 HC RX 258: Performed by: PHYSICIAN ASSISTANT

## 2019-06-14 PROCEDURE — 85610 PROTHROMBIN TIME: CPT

## 2019-06-14 PROCEDURE — 85014 HEMATOCRIT: CPT

## 2019-06-14 PROCEDURE — 82330 ASSAY OF CALCIUM: CPT

## 2019-06-14 PROCEDURE — 80053 COMPREHEN METABOLIC PANEL: CPT

## 2019-06-14 PROCEDURE — 6360000002 HC RX W HCPCS: Performed by: FAMILY MEDICINE

## 2019-06-14 PROCEDURE — 1200000000 HC SEMI PRIVATE

## 2019-06-14 PROCEDURE — 2580000003 HC RX 258: Performed by: NURSE PRACTITIONER

## 2019-06-14 PROCEDURE — 6360000002 HC RX W HCPCS: Performed by: PHYSICIAN ASSISTANT

## 2019-06-14 PROCEDURE — 99232 SBSQ HOSP IP/OBS MODERATE 35: CPT | Performed by: INTERNAL MEDICINE

## 2019-06-14 PROCEDURE — C9113 INJ PANTOPRAZOLE SODIUM, VIA: HCPCS | Performed by: FAMILY MEDICINE

## 2019-06-14 PROCEDURE — 84443 ASSAY THYROID STIM HORMONE: CPT

## 2019-06-14 PROCEDURE — APPSS30 APP SPLIT SHARED TIME 16-30 MINUTES: Performed by: PHYSICIAN ASSISTANT

## 2019-06-14 PROCEDURE — 85018 HEMOGLOBIN: CPT

## 2019-06-14 PROCEDURE — 2500000003 HC RX 250 WO HCPCS: Performed by: PHYSICIAN ASSISTANT

## 2019-06-14 PROCEDURE — 84100 ASSAY OF PHOSPHORUS: CPT

## 2019-06-14 RX ORDER — LORAZEPAM 2 MG/ML
1 INJECTION INTRAMUSCULAR
Status: DISCONTINUED | OUTPATIENT
Start: 2019-06-14 | End: 2019-06-17 | Stop reason: HOSPADM

## 2019-06-14 RX ORDER — LORAZEPAM 1 MG/1
1 TABLET ORAL
Status: DISCONTINUED | OUTPATIENT
Start: 2019-06-14 | End: 2019-06-17 | Stop reason: HOSPADM

## 2019-06-14 RX ORDER — SODIUM CHLORIDE 0.9 % (FLUSH) 0.9 %
10 SYRINGE (ML) INJECTION EVERY 12 HOURS SCHEDULED
Status: DISCONTINUED | OUTPATIENT
Start: 2019-06-14 | End: 2019-06-17 | Stop reason: HOSPADM

## 2019-06-14 RX ORDER — LORAZEPAM 1 MG/1
4 TABLET ORAL
Status: DISCONTINUED | OUTPATIENT
Start: 2019-06-14 | End: 2019-06-17 | Stop reason: HOSPADM

## 2019-06-14 RX ORDER — LORAZEPAM 1 MG/1
3 TABLET ORAL
Status: DISCONTINUED | OUTPATIENT
Start: 2019-06-14 | End: 2019-06-17 | Stop reason: HOSPADM

## 2019-06-14 RX ORDER — SODIUM CHLORIDE 0.9 % (FLUSH) 0.9 %
10 SYRINGE (ML) INJECTION PRN
Status: DISCONTINUED | OUTPATIENT
Start: 2019-06-14 | End: 2019-06-17 | Stop reason: HOSPADM

## 2019-06-14 RX ORDER — 0.9 % SODIUM CHLORIDE 0.9 %
500 INTRAVENOUS SOLUTION INTRAVENOUS ONCE
Status: COMPLETED | OUTPATIENT
Start: 2019-06-14 | End: 2019-06-14

## 2019-06-14 RX ORDER — FOLIC ACID 1 MG/1
1 TABLET ORAL DAILY
Status: DISCONTINUED | OUTPATIENT
Start: 2019-06-14 | End: 2019-06-17 | Stop reason: HOSPADM

## 2019-06-14 RX ORDER — LORAZEPAM 2 MG/ML
4 INJECTION INTRAMUSCULAR
Status: DISCONTINUED | OUTPATIENT
Start: 2019-06-14 | End: 2019-06-17 | Stop reason: HOSPADM

## 2019-06-14 RX ORDER — LORAZEPAM 1 MG/1
2 TABLET ORAL
Status: DISCONTINUED | OUTPATIENT
Start: 2019-06-14 | End: 2019-06-17 | Stop reason: HOSPADM

## 2019-06-14 RX ORDER — LORAZEPAM 2 MG/ML
3 INJECTION INTRAMUSCULAR
Status: DISCONTINUED | OUTPATIENT
Start: 2019-06-14 | End: 2019-06-17 | Stop reason: HOSPADM

## 2019-06-14 RX ORDER — LORAZEPAM 2 MG/ML
2 INJECTION INTRAMUSCULAR
Status: DISCONTINUED | OUTPATIENT
Start: 2019-06-14 | End: 2019-06-17 | Stop reason: HOSPADM

## 2019-06-14 RX ORDER — THIAMINE MONONITRATE (VIT B1) 100 MG
100 TABLET ORAL DAILY
Status: DISCONTINUED | OUTPATIENT
Start: 2019-06-14 | End: 2019-06-17 | Stop reason: HOSPADM

## 2019-06-14 RX ADMIN — LORAZEPAM 2 MG: 2 INJECTION INTRAMUSCULAR; INTRAVENOUS at 17:06

## 2019-06-14 RX ADMIN — SODIUM CHLORIDE: 9 INJECTION, SOLUTION INTRAVENOUS at 04:07

## 2019-06-14 RX ADMIN — SODIUM CHLORIDE 10 ML: 9 INJECTION INTRAMUSCULAR; INTRAVENOUS; SUBCUTANEOUS at 09:43

## 2019-06-14 RX ADMIN — LORAZEPAM 1 MG: 2 INJECTION INTRAMUSCULAR; INTRAVENOUS at 18:22

## 2019-06-14 RX ADMIN — CHLORDIAZEPOXIDE HYDROCHLORIDE 25 MG: 25 CAPSULE ORAL at 11:22

## 2019-06-14 RX ADMIN — LORAZEPAM 1 MG: 2 INJECTION INTRAMUSCULAR; INTRAVENOUS at 09:17

## 2019-06-14 RX ADMIN — SODIUM CHLORIDE 500 ML: 9 INJECTION, SOLUTION INTRAVENOUS at 04:30

## 2019-06-14 RX ADMIN — ONDANSETRON 4 MG: 2 INJECTION INTRAMUSCULAR; INTRAVENOUS at 09:21

## 2019-06-14 RX ADMIN — LORAZEPAM 1 MG: 2 INJECTION INTRAMUSCULAR; INTRAVENOUS at 11:38

## 2019-06-14 RX ADMIN — LORAZEPAM 1 MG: 2 INJECTION INTRAMUSCULAR; INTRAVENOUS at 21:19

## 2019-06-14 RX ADMIN — LORAZEPAM 1 MG: 2 INJECTION INTRAMUSCULAR; INTRAVENOUS at 10:35

## 2019-06-14 RX ADMIN — PANTOPRAZOLE SODIUM 40 MG: 40 INJECTION, POWDER, LYOPHILIZED, FOR SOLUTION INTRAVENOUS at 21:19

## 2019-06-14 RX ADMIN — QUETIAPINE FUMARATE 300 MG: 300 TABLET ORAL at 11:23

## 2019-06-14 RX ADMIN — SODIUM CHLORIDE: 9 INJECTION, SOLUTION INTRAVENOUS at 04:00

## 2019-06-14 RX ADMIN — FOLIC ACID: 5 INJECTION, SOLUTION INTRAMUSCULAR; INTRAVENOUS; SUBCUTANEOUS at 09:37

## 2019-06-14 RX ADMIN — CHLORDIAZEPOXIDE HYDROCHLORIDE 25 MG: 25 CAPSULE ORAL at 18:45

## 2019-06-14 RX ADMIN — PANTOPRAZOLE SODIUM 40 MG: 40 INJECTION, POWDER, LYOPHILIZED, FOR SOLUTION INTRAVENOUS at 09:42

## 2019-06-14 RX ADMIN — QUETIAPINE FUMARATE 300 MG: 300 TABLET ORAL at 20:33

## 2019-06-14 RX ADMIN — ONDANSETRON 4 MG: 2 INJECTION INTRAMUSCULAR; INTRAVENOUS at 17:06

## 2019-06-14 RX ADMIN — SODIUM CHLORIDE 10 ML: 9 INJECTION INTRAMUSCULAR; INTRAVENOUS; SUBCUTANEOUS at 21:19

## 2019-06-14 RX ADMIN — CALCIUM GLUCONATE 2 G: 98 INJECTION, SOLUTION INTRAVENOUS at 09:37

## 2019-06-14 ASSESSMENT — ENCOUNTER SYMPTOMS
CHEST TIGHTNESS: 0
DIARRHEA: 0
VOMITING: 1
ABDOMINAL DISTENTION: 0
COUGH: 0
ABDOMINAL PAIN: 1
SHORTNESS OF BREATH: 0
BLOOD IN STOOL: 0
NAUSEA: 1
WHEEZING: 0
TROUBLE SWALLOWING: 1
EYE PAIN: 0
CONSTIPATION: 0

## 2019-06-14 NOTE — PLAN OF CARE
Problem: Falls - Risk of:  Goal: Will remain free from falls  Description  Will remain free from falls  6/14/2019 1605 by Jose Newman RN  Outcome: Ongoing  6/14/2019 0250 by Tessa Mena RN  Outcome: Ongoing  Goal: Absence of physical injury  Description  Absence of physical injury  6/14/2019 1605 by Jose Newman RN  Outcome: Ongoing  6/14/2019 0250 by Tessa Mena RN  Outcome: Ongoing     Problem: Nutrition  Goal: Optimal nutrition therapy  6/14/2019 1605 by Jose Newman RN  Outcome: Ongoing  6/14/2019 0250 by Tessa Mena RN  Outcome: Ongoing

## 2019-06-14 NOTE — PROGRESS NOTES
Occupational Therapy Not Seen Note    DATE: 2019  Name: Fernando Michelle  : 1970  MRN: [id number]    Patient not available for Occupational Therapy due to:    RN cx this date. CIWA protocol, per RN pt has been nauseated all day, agitated, just now fell asleep and is calm. Check 6/15.     Electronically signed by Maria Ines Mix S/OT on 2019 at 1:40 PM

## 2019-06-14 NOTE — PROGRESS NOTES
Pt has been feeling nauseated and sleeping most of shift. Pt has received medication for nausea, and has been offered opportunity to order lunch and dinner which he has declined to do.  Will continue to monitor

## 2019-06-14 NOTE — PROGRESS NOTES
[facility]    Progress Note    6/14/2019    9:22 AM    Name:   Serge Damon  MRN:     [id number]     Acct:      [de-identified]   Room:   [address] [place] [address] Day:  1  Admit Date:  6/13/2019  7:52 AM    PCP:   . None (Inactive)  Code Status:  Full Code    Subjective:     C/C:   Chief Complaint   Patient presents with    Withdrawal     Withdrawal symptoms. no drink for past 3 days     Interval History Status:  Did not require ativan overnight. Blood pressure 88/53 at 0420, pt reportedly sleeping at that time. Recheck this a.m. 144/89. Hemoglobin has dropped 4 points since admission (14.4 --> 10.1) although he has received over 3L since admission. Had one episode of vomiting last evening that he describes as dark brown. No gross blood or coffee grounds. Has not had a bowel movement. Attempted to eat this morning and had significant epigastric pain. Currently complaining of nausea and severe anxiety, nurse in room and administered ativan. Database updates:  Hgb (14.4 --> 10.1)  Labs otherwise stable    Brief History:     The patient is a 47year-old male with a history of alcohol dependence and previous seizures associated with alcohol withdrawal. Most recently he was admitted from 5/28/2019 - 5/30/2019 for alcohol withdrawal and hematemesis and underwent EGD, which was significant for gastritis. He signed out [place] on 5/30 and has since been living at a rehabilitation facility ([facility]). He reports that he last had a fifth of alcohol 3 days ago and started experiencing withdrawal symptoms within 24 hours of cessation. This morning he had worsening tremors, diaphoresis, vomiting, and anxiety prompting him to seek medical attention.      ED evaluation was essentially unremarkable. Hemoglobin normal. Ethanol undetectable. Potassium 3.5 otherwise no significant electrolyte abnormalities.  Admitted to medicine for management of his withdrawal.       Past Medical History:   has a past medical history of Alcohol abuse, GI bleed, Hematemesis, Hypertension, Intractable vomiting, and Suicidal behavior. Social History:   reports that he has never smoked. He has never used smokeless tobacco. He reports that he drank alcohol. He reports that he does not use drugs. Family History:   Family History   Adopted: Yes   Problem Relation Age of Onset    Asthma Mother     Crohn's Disease Mother     No Known Problems Father     Other Sister         Fibromyalgia    No Known Problems Brother     No Known Problems Maternal Grandmother     No Known Problems Maternal Grandfather     No Known Problems Paternal Grandmother     No Known Problems Paternal Grandfather     No Known Problems Daughter     Depression Daughter     No Known Problems Son        Medications: Allergies: Allergies   Allergen Reactions    Pcn [Penicillins] Rash       Current Meds:   Scheduled Meds:   calcium gluconate IVPB  2 g Intravenous Once    multivitamin  1 tablet Oral Daily    sodium chloride flush  10 mL Intravenous 2 times per day    folic acid, thiamine, multi-vitamin with vitamin K infusion   Intravenous Daily    nicotine  1 patch Transdermal Daily    enoxaparin  40 mg Subcutaneous Daily    chlordiazePOXIDE  25 mg Oral TID    QUEtiapine  300 mg Oral BID    buPROPion  150 mg Oral Daily    pantoprazole  40 mg Intravenous BID    And    sodium chloride (PF)  10 mL Intravenous BID     Continuous Infusions:    sodium chloride 125 mL/hr at 06/14/19 0407     PRN Meds: docusate sodium, traZODone, sodium chloride flush, potassium chloride **OR** potassium alternative oral replacement **OR** potassium chloride, ondansetron, acetaminophen, LORazepam, labetalol      Review of Systems:     Review of Systems   Constitutional: Positive for chills and diaphoresis. Negative for fever. HENT: Positive for trouble swallowing. Eyes: Negative for pain and visual disturbance.    Respiratory: Negative for cough, chest tightness, shortness of breath and wheezing. Cardiovascular: Negative for chest pain, palpitations and leg swelling. Gastrointestinal: Positive for abdominal pain, nausea and vomiting. Negative for abdominal distention, blood in stool, constipation and diarrhea. Genitourinary: Negative for dysuria and hematuria. Neurological: Positive for tremors. Negative for dizziness, light-headedness, numbness and headaches. Psychiatric/Behavioral: The patient is nervous/anxious. Physical Examination:        Vitals:  BP (!) 144/89   Pulse 78   Temp 97.5 °F (36.4 °C) (Oral)   Resp 11   Ht 5' 9\" (1.753 m)   Wt 135 lb (61.2 kg)   SpO2 98%   BMI 19.94 kg/m²   Temp (24hrs), Av.1 °F (36.7 °C), Min:97.5 °F (36.4 °C), Max:98.8 °F (37.1 °C)    No results for input(s): POCGLU in the last 72 hours. Physical Exam   Constitutional: He is oriented to person, place, and time. He appears distressed. HENT:   Head: Normocephalic and atraumatic. Eyes: Conjunctivae are normal.   Cardiovascular: Regular rhythm and normal heart sounds. tachycardic   Pulmonary/Chest: Effort normal and breath sounds normal.   Abdominal: Soft. Bowel sounds are normal. He exhibits no distension. There is tenderness (epigastrium). Musculoskeletal: Normal range of motion. He exhibits no edema. Neurological: He is alert and oriented to person, place, and time. Skin: Skin is warm and dry. Capillary refill takes less than 2 seconds. He is not diaphoretic. Psychiatric:   Visibly restless/anxious         Data:     I/O (24Hr):     Intake/Output Summary (Last 24 hours) at 2019 0922  Last data filed at 2019 0654  Gross per 24 hour   Intake 3095 ml   Output --   Net 3095 ml       Labs:    WBC 6.3 k/uL   RBC 3.24Low m/uL   Hemoglobin 10.1Low g/dL   Hematocrit 32.6Low %   .6 fL   MCH 31.2 pg   MCHC 31.0 g/dL   RDW 12.8 %   Platelets 124 k/uL   MPV 11.0 fL     Calcium, Ion 1.05Low mmol/L     TSH 1. 14 mIU/L   Phosphorus 1.9Low mg/dL     Glucose 87 mg/dL   BUN 11 mg/dL   CREATININE 0.56Low mg/dL   Calcium 7.1Low mg/dL   Sodium 138 mmol/L   Potassium 3.5Low mmol/L   Chloride 109High mmol/L   CO2 20 mmol/L   Anion Gap 9 mmol/L   Alkaline Phosphatase 41 U/L   ALT 8 U/L   AST 12 U/L   Total Bilirubin 0.48 mg/dL   Total Protein 5.1Low g/dL   Alb 3.0Low g/dL   Albumin/Globulin Ratio 1. GFR Non- >60 mL/min       Lab Results   Component Value Date/Time    SPECIAL L HAND 6ML 02/19/2019 10:35 AM    SPECIAL L HAND 6ML 02/19/2019 10:35 AM     Lab Results   Component Value Date/Time    CULTURE NO GROWTH 6 DAYS 02/19/2019 10:35 AM    CULTURE NO GROWTH 6 DAYS 02/19/2019 10:35 AM        Assessment:        Primary Problem  Principal Problem:    Alcohol withdrawal syndrome, uncomplicated (HCC)  Active Problems:    Gastritis    Anxiety    Essential hypertension    Hypokalemia    Severe major depression without psychotic features ([address]    Moderate malnutrition ([address]  Resolved Problems:    * No resolved hospital problems.  *      Plan         CIWA protocol  Repeat Hgb this afternoon  Replete phosphorous  Replete calcium  Replete potassium  Antiemetics  IV thiamine/folic acid  Librium TID  Protonix IV bid  Monitor stool/vomit for blood  Trend lytes, replete as necessary      IP CONSULT TO HOSPITALIST  IP CONSULT TO DIETITIAN  IP CONSULT TO SOCIAL WORK  IP CONSULT TO SPIRITUAL SERVICES    Georgie Roberto PA-C    6/14/2019    9:22 AM

## 2019-06-14 NOTE — PROGRESS NOTES
Physical Therapy  DATE: 2019    NAME: Sri Mccormick  MRN: [id number]   : 1970    Patient not seen this date for Physical Therapy due to:  [] Blood transfusion in progress  [] Hemodialysis  []  Patient Declined  [] Spine Precautions   [] Strict Bedrest  [] Surgery/ Procedure  [] Testing      [x] Other: Pt declined therapy, states he's getting to shower now. Pt amb to shower stable, no assist.         [] PT being discontinued at this time. Patient independent. No further needs. [] PT being discontinued at this time as the patient has been transferred to palliative care. No further needs.     Lucas Blake, PT

## 2019-06-15 ENCOUNTER — APPOINTMENT (OUTPATIENT)
Dept: CT IMAGING | Age: 49
End: 2019-06-15
Payer: COMMERCIAL

## 2019-06-15 LAB
ABSOLUTE EOS #: 0.09 K/UL (ref 0–0.44)
ABSOLUTE IMMATURE GRANULOCYTE: <0.03 K/UL (ref 0–0.3)
ABSOLUTE LYMPH #: 1.12 K/UL (ref 1.1–3.7)
ABSOLUTE MONO #: 0.52 K/UL (ref 0.1–1.2)
ANION GAP SERPL CALCULATED.3IONS-SCNC: 14 MMOL/L (ref 9–17)
BASOPHILS # BLD: 0 % (ref 0–2)
BASOPHILS ABSOLUTE: <0.03 K/UL (ref 0–0.2)
BILIRUBIN URINE: NEGATIVE
BUN BLDV-MCNC: 10 MG/DL (ref 6–20)
BUN/CREAT BLD: ABNORMAL (ref 9–20)
CALCIUM SERPL-MCNC: 8.1 MG/DL (ref 8.6–10.4)
CHLORIDE BLD-SCNC: 102 MMOL/L (ref 98–107)
CO2: 21 MMOL/L (ref 20–31)
COLOR: YELLOW
COMMENT UA: ABNORMAL
CORTISOL COLLECTION INFO: ABNORMAL
CORTISOL COLLECTION INFO: ABNORMAL
CORTISOL COLLECTION INFO: NORMAL
CORTISOL COLLECTION INFO: NORMAL
CORTISOL: 2.3 UG/DL (ref 2.7–18.4)
CORTISOL: 2.9 UG/DL (ref 2.7–18.4)
CORTISOL: 31.9 UG/DL (ref 2.7–18.4)
CORTISOL: 5.5 UG/DL (ref 2.7–18.4)
CREAT SERPL-MCNC: 0.71 MG/DL (ref 0.7–1.2)
DIFFERENTIAL TYPE: ABNORMAL
EOSINOPHILS RELATIVE PERCENT: 2 % (ref 1–4)
GFR AFRICAN AMERICAN: >60 ML/MIN
GFR NON-AFRICAN AMERICAN: >60 ML/MIN
GFR SERPL CREATININE-BSD FRML MDRD: ABNORMAL ML/MIN/{1.73_M2}
GFR SERPL CREATININE-BSD FRML MDRD: ABNORMAL ML/MIN/{1.73_M2}
GLUCOSE BLD-MCNC: 106 MG/DL (ref 70–99)
GLUCOSE URINE: NEGATIVE
HCT VFR BLD CALC: 38.7 % (ref 40.7–50.3)
HEMOGLOBIN: 12.6 G/DL (ref 13–17)
IMMATURE GRANULOCYTES: 0 %
KETONES, URINE: ABNORMAL
LACTIC ACID, WHOLE BLOOD: 1.1 MMOL/L (ref 0.7–2.1)
LEUKOCYTE ESTERASE, URINE: NEGATIVE
LYMPHOCYTES # BLD: 20 % (ref 24–43)
MAGNESIUM: 2 MG/DL (ref 1.6–2.6)
MCH RBC QN AUTO: 31.6 PG (ref 25.2–33.5)
MCHC RBC AUTO-ENTMCNC: 32.6 G/DL (ref 28.4–34.8)
MCV RBC AUTO: 97 FL (ref 82.6–102.9)
MONOCYTES # BLD: 9 % (ref 3–12)
NITRITE, URINE: NEGATIVE
NRBC AUTOMATED: 0 PER 100 WBC
PDW BLD-RTO: 12.1 % (ref 11.8–14.4)
PH UA: 5.5 (ref 5–8)
PLATELET # BLD: 185 K/UL (ref 138–453)
PLATELET ESTIMATE: ABNORMAL
PMV BLD AUTO: 10.8 FL (ref 8.1–13.5)
POTASSIUM SERPL-SCNC: 3.1 MMOL/L (ref 3.7–5.3)
PROCALCITONIN: 0.05 NG/ML
PROTEIN UA: NEGATIVE
RBC # BLD: 3.99 M/UL (ref 4.21–5.77)
RBC # BLD: ABNORMAL 10*6/UL
SEG NEUTROPHILS: 69 % (ref 36–65)
SEGMENTED NEUTROPHILS ABSOLUTE COUNT: 3.83 K/UL (ref 1.5–8.1)
SODIUM BLD-SCNC: 137 MMOL/L (ref 135–144)
SPECIFIC GRAVITY UA: 1.01 (ref 1–1.03)
TURBIDITY: CLEAR
URINE HGB: NEGATIVE
UROBILINOGEN, URINE: NORMAL
WBC # BLD: 5.6 K/UL (ref 3.5–11.3)
WBC # BLD: ABNORMAL 10*3/UL

## 2019-06-15 PROCEDURE — 82533 TOTAL CORTISOL: CPT

## 2019-06-15 PROCEDURE — 94760 N-INVAS EAR/PLS OXIMETRY 1: CPT

## 2019-06-15 PROCEDURE — 82024 ASSAY OF ACTH: CPT

## 2019-06-15 PROCEDURE — 83605 ASSAY OF LACTIC ACID: CPT

## 2019-06-15 PROCEDURE — 6360000002 HC RX W HCPCS: Performed by: FAMILY MEDICINE

## 2019-06-15 PROCEDURE — 1200000000 HC SEMI PRIVATE

## 2019-06-15 PROCEDURE — 36415 COLL VENOUS BLD VENIPUNCTURE: CPT

## 2019-06-15 PROCEDURE — 84244 ASSAY OF RENIN: CPT

## 2019-06-15 PROCEDURE — 6360000002 HC RX W HCPCS: Performed by: PHYSICIAN ASSISTANT

## 2019-06-15 PROCEDURE — 2580000003 HC RX 258: Performed by: INTERNAL MEDICINE

## 2019-06-15 PROCEDURE — 74176 CT ABD & PELVIS W/O CONTRAST: CPT

## 2019-06-15 PROCEDURE — 6370000000 HC RX 637 (ALT 250 FOR IP): Performed by: INTERNAL MEDICINE

## 2019-06-15 PROCEDURE — C9113 INJ PANTOPRAZOLE SODIUM, VIA: HCPCS | Performed by: FAMILY MEDICINE

## 2019-06-15 PROCEDURE — 84145 PROCALCITONIN (PCT): CPT

## 2019-06-15 PROCEDURE — 85025 COMPLETE CBC W/AUTO DIFF WBC: CPT

## 2019-06-15 PROCEDURE — 87040 BLOOD CULTURE FOR BACTERIA: CPT

## 2019-06-15 PROCEDURE — 6370000000 HC RX 637 (ALT 250 FOR IP): Performed by: FAMILY MEDICINE

## 2019-06-15 PROCEDURE — 2580000003 HC RX 258: Performed by: NURSE PRACTITIONER

## 2019-06-15 PROCEDURE — 82088 ASSAY OF ALDOSTERONE: CPT

## 2019-06-15 PROCEDURE — 99232 SBSQ HOSP IP/OBS MODERATE 35: CPT | Performed by: INTERNAL MEDICINE

## 2019-06-15 PROCEDURE — 2580000003 HC RX 258: Performed by: FAMILY MEDICINE

## 2019-06-15 PROCEDURE — 6370000000 HC RX 637 (ALT 250 FOR IP): Performed by: PHYSICIAN ASSISTANT

## 2019-06-15 PROCEDURE — 83735 ASSAY OF MAGNESIUM: CPT

## 2019-06-15 PROCEDURE — 80048 BASIC METABOLIC PNL TOTAL CA: CPT

## 2019-06-15 PROCEDURE — 81003 URINALYSIS AUTO W/O SCOPE: CPT

## 2019-06-15 PROCEDURE — 6360000002 HC RX W HCPCS: Performed by: INTERNAL MEDICINE

## 2019-06-15 RX ORDER — PANTOPRAZOLE SODIUM 40 MG/1
40 TABLET, DELAYED RELEASE ORAL
Status: DISCONTINUED | OUTPATIENT
Start: 2019-06-15 | End: 2019-06-17 | Stop reason: HOSPADM

## 2019-06-15 RX ORDER — COSYNTROPIN 0.25 MG/ML
250 INJECTION, POWDER, FOR SOLUTION INTRAMUSCULAR; INTRAVENOUS ONCE
Status: COMPLETED | OUTPATIENT
Start: 2019-06-15 | End: 2019-06-15

## 2019-06-15 RX ORDER — 0.9 % SODIUM CHLORIDE 0.9 %
500 INTRAVENOUS SOLUTION INTRAVENOUS ONCE
Status: COMPLETED | OUTPATIENT
Start: 2019-06-15 | End: 2019-06-15

## 2019-06-15 RX ORDER — MIDODRINE HYDROCHLORIDE 2.5 MG/1
2.5 TABLET ORAL
Status: DISCONTINUED | OUTPATIENT
Start: 2019-06-15 | End: 2019-06-16

## 2019-06-15 RX ORDER — 0.9 % SODIUM CHLORIDE 0.9 %
1000 INTRAVENOUS SOLUTION INTRAVENOUS ONCE
Status: COMPLETED | OUTPATIENT
Start: 2019-06-15 | End: 2019-06-15

## 2019-06-15 RX ADMIN — TRAZODONE HYDROCHLORIDE 50 MG: 50 TABLET ORAL at 22:04

## 2019-06-15 RX ADMIN — LORAZEPAM 2 MG: 1 TABLET ORAL at 20:04

## 2019-06-15 RX ADMIN — SODIUM CHLORIDE 10 ML: 9 INJECTION INTRAMUSCULAR; INTRAVENOUS; SUBCUTANEOUS at 09:21

## 2019-06-15 RX ADMIN — LORAZEPAM 1 MG: 1 TABLET ORAL at 17:40

## 2019-06-15 RX ADMIN — CHLORDIAZEPOXIDE HYDROCHLORIDE 25 MG: 25 CAPSULE ORAL at 12:03

## 2019-06-15 RX ADMIN — BUPROPION HYDROCHLORIDE 150 MG: 150 TABLET, FILM COATED, EXTENDED RELEASE ORAL at 09:07

## 2019-06-15 RX ADMIN — Medication 100 MG: at 09:08

## 2019-06-15 RX ADMIN — ONDANSETRON 4 MG: 2 INJECTION INTRAMUSCULAR; INTRAVENOUS at 17:40

## 2019-06-15 RX ADMIN — QUETIAPINE FUMARATE 300 MG: 300 TABLET ORAL at 21:49

## 2019-06-15 RX ADMIN — LORAZEPAM 1 MG: 1 TABLET ORAL at 12:46

## 2019-06-15 RX ADMIN — LORAZEPAM 2 MG: 1 TABLET ORAL at 22:01

## 2019-06-15 RX ADMIN — THERA TABS 1 TABLET: TAB at 09:08

## 2019-06-15 RX ADMIN — ENOXAPARIN SODIUM 40 MG: 40 INJECTION, SOLUTION INTRAVENOUS; SUBCUTANEOUS at 09:07

## 2019-06-15 RX ADMIN — ACETAMINOPHEN 650 MG: 325 TABLET ORAL at 18:55

## 2019-06-15 RX ADMIN — PANTOPRAZOLE SODIUM 40 MG: 40 INJECTION, POWDER, LYOPHILIZED, FOR SOLUTION INTRAVENOUS at 09:07

## 2019-06-15 RX ADMIN — COSYNTROPIN 250 MCG: 0.25 INJECTION, POWDER, LYOPHILIZED, FOR SOLUTION INTRAMUSCULAR; INTRAVENOUS at 15:41

## 2019-06-15 RX ADMIN — PANTOPRAZOLE SODIUM 40 MG: 40 TABLET, DELAYED RELEASE ORAL at 15:48

## 2019-06-15 RX ADMIN — SODIUM CHLORIDE 500 ML: 9 INJECTION, SOLUTION INTRAVENOUS at 14:50

## 2019-06-15 RX ADMIN — MIDODRINE HYDROCHLORIDE 2.5 MG: 2.5 TABLET ORAL at 14:49

## 2019-06-15 RX ADMIN — LORAZEPAM 1 MG: 1 TABLET ORAL at 09:18

## 2019-06-15 RX ADMIN — QUETIAPINE FUMARATE 300 MG: 300 TABLET ORAL at 10:56

## 2019-06-15 RX ADMIN — CHLORDIAZEPOXIDE HYDROCHLORIDE 25 MG: 25 CAPSULE ORAL at 18:55

## 2019-06-15 RX ADMIN — LORAZEPAM 2 MG: 1 TABLET ORAL at 23:58

## 2019-06-15 RX ADMIN — POTASSIUM CHLORIDE 40 MEQ: 20 TABLET, EXTENDED RELEASE ORAL at 12:46

## 2019-06-15 RX ADMIN — SODIUM CHLORIDE 500 ML: 9 INJECTION, SOLUTION INTRAVENOUS at 01:08

## 2019-06-15 RX ADMIN — SODIUM CHLORIDE 1000 ML: 9 INJECTION, SOLUTION INTRAVENOUS at 12:18

## 2019-06-15 RX ADMIN — FOLIC ACID 1 MG: 1 TABLET ORAL at 09:07

## 2019-06-15 ASSESSMENT — PAIN SCALES - GENERAL
PAINLEVEL_OUTOF10: 8

## 2019-06-15 ASSESSMENT — ENCOUNTER SYMPTOMS
ABDOMINAL PAIN: 0
EYE PAIN: 0
COUGH: 0
BLOOD IN STOOL: 0
CONSTIPATION: 0
DIARRHEA: 0
ABDOMINAL DISTENTION: 0
WHEEZING: 0
TROUBLE SWALLOWING: 0
SHORTNESS OF BREATH: 0
VOMITING: 0
NAUSEA: 0
CHEST TIGHTNESS: 0

## 2019-06-15 NOTE — PROGRESS NOTES
[facility]    Progress Note    6/15/2019    12:44 PM    Name:   Amada Blankenship  MRN:     [id number]     Acct:      [de-identified]   Room:   [address] [place] [address] Day:  2  Admit Date:  6/13/2019  7:52 AM    PCP:   . None (Inactive)  Code Status:  Full Code    Subjective:     C/C:   Chief Complaint   Patient presents with    Withdrawal     Withdrawal symptoms. no drink for past 3 days     Interval History Status:  Patient is doing better today, he is not anxious, he is eating food, his epigastric pain has improved, no chest pain, no shortness of breath, blood pressure is low this morning, no bleeding, hemoglobin is stable    Brief History:     The patient is a 47year-old male with a history of alcohol dependence and previous seizures associated with alcohol withdrawal. Most recently he was admitted from 5/28/2019 - 5/30/2019 for alcohol withdrawal and hematemesis and underwent EGD, which was significant for gastritis. He signed out [facility] on 5/30 and has since been living at a rehabilitation facility ([facility]). He reports that he last had a fifth of alcohol 3 days ago and started experiencing withdrawal symptoms within 24 hours of cessation. This morning he had worsening tremors, diaphoresis, vomiting, and anxiety prompting him to seek medical attention.      ED evaluation was essentially unremarkable. Hemoglobin normal. Ethanol undetectable. Potassium 3.5 otherwise no significant electrolyte abnormalities. Admitted to medicine for management of his withdrawal.       Past Medical History:   has a past medical history of Alcohol abuse, GI bleed, Hematemesis, Hypertension, Intractable vomiting, and Suicidal behavior. Social History:   reports that he has never smoked. He has never used smokeless tobacco. He reports that he drank alcohol. He reports that he does not use drugs.      Family History:   Family History   Adopted: Yes   Problem Relation Age of Onset    Asthma Mother     Crohn's Disease Mother     No Known Problems Father     Other Sister         Fibromyalgia    No Known Problems Brother     No Known Problems Maternal Grandmother     No Known Problems Maternal Grandfather     No Known Problems Paternal Grandmother     No Known Problems Paternal Grandfather     No Known Problems Daughter     Depression Daughter     No Known Problems Son        Medications: Allergies: Allergies   Allergen Reactions    Pcn [Penicillins] Rash       Current Meds:   Scheduled Meds:   pantoprazole  40 mg Oral BID AC    sodium chloride  1,000 mL Intravenous Once    sodium chloride flush  10 mL Intravenous 2 times per day    thiamine  100 mg Oral Daily    folic acid  1 mg Oral Daily    multivitamin  1 tablet Oral Daily    sodium chloride flush  10 mL Intravenous 2 times per day    nicotine  1 patch Transdermal Daily    enoxaparin  40 mg Subcutaneous Daily    chlordiazePOXIDE  25 mg Oral TID    QUEtiapine  300 mg Oral BID    buPROPion  150 mg Oral Daily     Continuous Infusions:    sodium chloride 75 mL/hr (06/14/19 1337)     PRN Meds: sodium chloride flush, LORazepam **OR** LORazepam **OR** LORazepam **OR** LORazepam **OR** LORazepam **OR** LORazepam **OR** LORazepam **OR** LORazepam, docusate sodium, traZODone, sodium chloride flush, potassium chloride **OR** potassium alternative oral replacement **OR** potassium chloride, ondansetron, acetaminophen, labetalol      Review of Systems:     Review of Systems   Constitutional: Negative for chills, diaphoresis and fever. HENT: Negative for trouble swallowing. Eyes: Negative for pain and visual disturbance. Respiratory: Negative for cough, chest tightness, shortness of breath and wheezing. Cardiovascular: Negative for chest pain, palpitations and leg swelling. Gastrointestinal: Negative for abdominal distention, abdominal pain, blood in stool, constipation, diarrhea, nausea and vomiting. Genitourinary: Negative for dysuria and hematuria. Neurological: Negative for dizziness, tremors, light-headedness, numbness and headaches. Psychiatric/Behavioral: The patient is not nervous/anxious. Physical Examination:        Vitals:  BP (!) 73/43   Pulse 98   Temp 98.2 °F (36.8 °C) (Oral)   Resp 18   Ht 5' 9\" (1.753 m)   Wt 144 lb 8 oz (65.5 kg)   SpO2 93%   BMI 21.34 kg/m²   Temp (24hrs), Av.8 °F (36.6 °C), Min:97.3 °F (36.3 °C), Max:98.2 °F (36.8 °C)    No results for input(s): POCGLU in the last 72 hours. Physical Exam   Constitutional: He is oriented to person, place, and time. No distress. HENT:   Head: Normocephalic and atraumatic. Eyes: Conjunctivae are normal.   Cardiovascular: Regular rhythm and normal heart sounds. Pulmonary/Chest: Effort normal and breath sounds normal.   Abdominal: Soft. Bowel sounds are normal. He exhibits no distension. There is no tenderness. Musculoskeletal: Normal range of motion. He exhibits no edema. Neurological: He is alert and oriented to person, place, and time. Skin: Skin is warm and dry. Capillary refill takes less than 2 seconds. He is not diaphoretic. Data:     I/O (24Hr):     Intake/Output Summary (Last 24 hours) at 6/15/2019 1244  Last data filed at 6/15/2019 0707  Gross per 24 hour   Intake 1300 ml   Output 450 ml   Net 850 ml       Labs:    WBC 6.3 k/uL   RBC 3.24Low m/uL   Hemoglobin 10.1Low g/dL   Hematocrit 32.6Low %   .6 fL   MCH 31.2 pg   MCHC 31.0 g/dL   RDW 12.8 %   Platelets 790 k/uL   MPV 11.0 fL     Calcium, Ion 1.05Low mmol/L     TSH 1.14 mIU/L   Phosphorus 1.9Low mg/dL     Glucose 87 mg/dL   BUN 11 mg/dL   CREATININE 0.56Low mg/dL   Calcium 7.1Low mg/dL   Sodium 138 mmol/L   Potassium 3.5Low mmol/L   Chloride 109High mmol/L   CO2 20 mmol/L   Anion Gap 9 mmol/L   Alkaline Phosphatase 41 U/L   ALT 8 U/L   AST 12 U/L   Total Bilirubin 0.48 mg/dL   Total Protein 5.1Low g/dL   Alb 3.0Low g/dL Albumin/Globulin Ratio 1. GFR Non- >60 mL/min       Lab Results   Component Value Date/Time    SPECIAL L HAND 6ML 02/19/2019 10:35 AM    SPECIAL L HAND 6ML 02/19/2019 10:35 AM     Lab Results   Component Value Date/Time    CULTURE NO GROWTH 6 DAYS 02/19/2019 10:35 AM    CULTURE NO GROWTH 6 DAYS 02/19/2019 10:35 AM        Assessment:        Primary Problem  Principal Problem:    Alcohol withdrawal syndrome, uncomplicated (HCC)  Active Problems:    Gastritis    Anxiety    Essential hypertension    Hypokalemia    Severe major depression without psychotic features ([address]    Moderate malnutrition ([address]  Resolved Problems:    * No resolved hospital problems.  *      Plan       Patient is improving, tremors have improved, not anxious anymore, eating food today  No more abdominal pain  Continue IV fluids, continue thiamine folic acid  Continue Protonix  Replace potassium  Patient had issues with low blood pressure, no tachycardia, check cortisol, lactic acid, procalcitonin, cultures, no evidence of infection at this point        IP CONSULT TO HOSPITALIST  IP CONSULT TO DIETITIAN  IP CONSULT TO SOCIAL WORK  IP CONSULT TO Jadon Tuttle MD    6/15/2019    12:44 PM

## 2019-06-15 NOTE — FLOWSHEET NOTE
Assessment: Patient is a 47 y.o. male who was admitted to the hospital due to \"withdrawal.\" Per nurse, patient is \"on day four. \" Patient was sleeping, with the hospital bed sheet covering his face, when  visited. Patient woke upon  calling his name. Patient shared that he \"just wants to sleep,\" indicating that he is \"very tired. Patient indicated that he had no needs at time of visit. Per chart, patient's sister is his emergency contact, Klaudia Priest ([phone]). Intervention:  visited patient per initial rounding visits and per [facility]. \" Jose Peoples knocked on closed door and greeted patient in room.  introduced herself to patient and sat down in chair beside hospital bed.  offered support to him.  provided hospitality to patient and darkened patient's room for his comfort.  informed patient of the 24/7 spiritual care available to him throughout his hospitalization. Outcomes: Patient thanked  for visit. Recommendation: Chaplains can make follow-up visit, per request. Sharon Floyd can be reached 24/7 via [employer].     Bree Camp     06/14/19 1952   Encounter Summary   Services provided to: Patient   Referral/Consult From: Multi-disciplinary team  ([address] [place] [address])   Support System Family members   Continue Visiting   (6/14/2019)   Complexity of Encounter Low   Length of Encounter 15 minutes   Routine   Type Initial   Spiritual/Christian   Type Spiritual support   Assessment Approachable;Coping   Intervention Explored feelings, thoughts, concerns;Sustaining presence/ Ministry of presence   Outcome Coping;Receptive

## 2019-06-15 NOTE — PLAN OF CARE
Problem: Falls - Risk of:  Goal: Will remain free from falls  Outcome: Ongoing  Goal: Absence of physical injury  Outcome: Ongoing     Problem: Nutrition  Goal: Optimal nutrition therapy  Outcome: Ongoing

## 2019-06-16 PROBLEM — F10.930 ALCOHOL WITHDRAWAL SYNDROME, UNCOMPLICATED (HCC): Status: RESOLVED | Noted: 2019-06-13 | Resolved: 2019-06-16

## 2019-06-16 LAB
ABSOLUTE EOS #: 0.2 K/UL (ref 0–0.44)
ABSOLUTE IMMATURE GRANULOCYTE: <0.03 K/UL (ref 0–0.3)
ABSOLUTE LYMPH #: 1.39 K/UL (ref 1.1–3.7)
ABSOLUTE MONO #: 0.59 K/UL (ref 0.1–1.2)
ANION GAP SERPL CALCULATED.3IONS-SCNC: 10 MMOL/L (ref 9–17)
BASOPHILS # BLD: 0 % (ref 0–2)
BASOPHILS ABSOLUTE: <0.03 K/UL (ref 0–0.2)
BUN BLDV-MCNC: 6 MG/DL (ref 6–20)
BUN/CREAT BLD: ABNORMAL (ref 9–20)
CALCIUM SERPL-MCNC: 8.5 MG/DL (ref 8.6–10.4)
CHLORIDE BLD-SCNC: 107 MMOL/L (ref 98–107)
CO2: 23 MMOL/L (ref 20–31)
CREAT SERPL-MCNC: 0.59 MG/DL (ref 0.7–1.2)
DIFFERENTIAL TYPE: ABNORMAL
EOSINOPHILS RELATIVE PERCENT: 4 % (ref 1–4)
GFR AFRICAN AMERICAN: >60 ML/MIN
GFR NON-AFRICAN AMERICAN: >60 ML/MIN
GFR SERPL CREATININE-BSD FRML MDRD: ABNORMAL ML/MIN/{1.73_M2}
GFR SERPL CREATININE-BSD FRML MDRD: ABNORMAL ML/MIN/{1.73_M2}
GLUCOSE BLD-MCNC: 114 MG/DL (ref 70–99)
HCT VFR BLD CALC: 33.5 % (ref 40.7–50.3)
HEMOGLOBIN: 11 G/DL (ref 13–17)
IMMATURE GRANULOCYTES: 0 %
LYMPHOCYTES # BLD: 27 % (ref 24–43)
MAGNESIUM: 1.7 MG/DL (ref 1.6–2.6)
MCH RBC QN AUTO: 31.6 PG (ref 25.2–33.5)
MCHC RBC AUTO-ENTMCNC: 32.8 G/DL (ref 28.4–34.8)
MCV RBC AUTO: 96.3 FL (ref 82.6–102.9)
MONOCYTES # BLD: 11 % (ref 3–12)
NRBC AUTOMATED: 0 PER 100 WBC
PDW BLD-RTO: 12.3 % (ref 11.8–14.4)
PLATELET # BLD: 183 K/UL (ref 138–453)
PLATELET ESTIMATE: ABNORMAL
PMV BLD AUTO: 10.9 FL (ref 8.1–13.5)
POTASSIUM SERPL-SCNC: 2.8 MMOL/L (ref 3.7–5.3)
RBC # BLD: 3.48 M/UL (ref 4.21–5.77)
RBC # BLD: ABNORMAL 10*6/UL
SEG NEUTROPHILS: 58 % (ref 36–65)
SEGMENTED NEUTROPHILS ABSOLUTE COUNT: 2.99 K/UL (ref 1.5–8.1)
SODIUM BLD-SCNC: 140 MMOL/L (ref 135–144)
WBC # BLD: 5.2 K/UL (ref 3.5–11.3)
WBC # BLD: ABNORMAL 10*3/UL

## 2019-06-16 PROCEDURE — 85025 COMPLETE CBC W/AUTO DIFF WBC: CPT

## 2019-06-16 PROCEDURE — 2580000003 HC RX 258: Performed by: INTERNAL MEDICINE

## 2019-06-16 PROCEDURE — 84132 ASSAY OF SERUM POTASSIUM: CPT

## 2019-06-16 PROCEDURE — 99232 SBSQ HOSP IP/OBS MODERATE 35: CPT | Performed by: INTERNAL MEDICINE

## 2019-06-16 PROCEDURE — 1200000000 HC SEMI PRIVATE

## 2019-06-16 PROCEDURE — 36415 COLL VENOUS BLD VENIPUNCTURE: CPT

## 2019-06-16 PROCEDURE — 6370000000 HC RX 637 (ALT 250 FOR IP): Performed by: INTERNAL MEDICINE

## 2019-06-16 PROCEDURE — 80048 BASIC METABOLIC PNL TOTAL CA: CPT

## 2019-06-16 PROCEDURE — 94761 N-INVAS EAR/PLS OXIMETRY MLT: CPT

## 2019-06-16 PROCEDURE — 6360000002 HC RX W HCPCS: Performed by: PHYSICIAN ASSISTANT

## 2019-06-16 PROCEDURE — 83735 ASSAY OF MAGNESIUM: CPT

## 2019-06-16 PROCEDURE — 6360000002 HC RX W HCPCS: Performed by: INTERNAL MEDICINE

## 2019-06-16 PROCEDURE — 6370000000 HC RX 637 (ALT 250 FOR IP): Performed by: FAMILY MEDICINE

## 2019-06-16 PROCEDURE — 6370000000 HC RX 637 (ALT 250 FOR IP): Performed by: PHYSICIAN ASSISTANT

## 2019-06-16 RX ORDER — LANOLIN ALCOHOL/MO/W.PET/CERES
100 CREAM (GRAM) TOPICAL DAILY
Qty: 30 TABLET | Refills: 3 | Status: SHIPPED | OUTPATIENT
Start: 2019-06-17

## 2019-06-16 RX ORDER — MAGNESIUM SULFATE 1 G/100ML
1 INJECTION INTRAVENOUS ONCE
Status: COMPLETED | OUTPATIENT
Start: 2019-06-16 | End: 2019-06-16

## 2019-06-16 RX ORDER — DOCUSATE SODIUM 100 MG/1
100 CAPSULE, LIQUID FILLED ORAL DAILY PRN
Qty: 30 CAPSULE | Refills: 0 | Status: SHIPPED | OUTPATIENT
Start: 2019-06-16 | End: 2019-07-16

## 2019-06-16 RX ORDER — MAGNESIUM SULFATE 1 G/100ML
1 INJECTION INTRAVENOUS
Status: DISPENSED | OUTPATIENT
Start: 2019-06-16 | End: 2019-06-16

## 2019-06-16 RX ORDER — MIDODRINE HYDROCHLORIDE 5 MG/1
5 TABLET ORAL
Status: DISCONTINUED | OUTPATIENT
Start: 2019-06-16 | End: 2019-06-17 | Stop reason: HOSPADM

## 2019-06-16 RX ORDER — CHLORDIAZEPOXIDE HYDROCHLORIDE 5 MG/1
CAPSULE, GELATIN COATED ORAL
Qty: 22 CAPSULE | Refills: 0 | Status: SHIPPED | OUTPATIENT
Start: 2019-06-16 | End: 2019-06-23

## 2019-06-16 RX ORDER — FOLIC ACID 1 MG/1
1 TABLET ORAL DAILY
Qty: 30 TABLET | Refills: 3 | Status: SHIPPED | OUTPATIENT
Start: 2019-06-17

## 2019-06-16 RX ORDER — ONDANSETRON 4 MG/1
4 TABLET, FILM COATED ORAL EVERY 8 HOURS PRN
Qty: 30 TABLET | Refills: 0 | Status: SHIPPED | OUTPATIENT
Start: 2019-06-16

## 2019-06-16 RX ORDER — MIDODRINE HYDROCHLORIDE 5 MG/1
5 TABLET ORAL
Qty: 90 TABLET | Refills: 3 | Status: SHIPPED | OUTPATIENT
Start: 2019-06-17

## 2019-06-16 RX ADMIN — POTASSIUM CHLORIDE 10 MEQ: 10 INJECTION, SOLUTION INTRAVENOUS at 17:38

## 2019-06-16 RX ADMIN — CHLORDIAZEPOXIDE HYDROCHLORIDE 25 MG: 25 CAPSULE ORAL at 20:42

## 2019-06-16 RX ADMIN — POTASSIUM CHLORIDE 10 MEQ: 10 INJECTION, SOLUTION INTRAVENOUS at 06:55

## 2019-06-16 RX ADMIN — PANTOPRAZOLE SODIUM 40 MG: 40 TABLET, DELAYED RELEASE ORAL at 15:42

## 2019-06-16 RX ADMIN — MIDODRINE HYDROCHLORIDE 5 MG: 5 TABLET ORAL at 12:00

## 2019-06-16 RX ADMIN — FOLIC ACID 1 MG: 1 TABLET ORAL at 09:57

## 2019-06-16 RX ADMIN — POTASSIUM CHLORIDE 10 MEQ: 10 INJECTION, SOLUTION INTRAVENOUS at 13:32

## 2019-06-16 RX ADMIN — QUETIAPINE FUMARATE 300 MG: 300 TABLET ORAL at 09:58

## 2019-06-16 RX ADMIN — POTASSIUM CHLORIDE 10 MEQ: 10 INJECTION, SOLUTION INTRAVENOUS at 10:51

## 2019-06-16 RX ADMIN — POTASSIUM CHLORIDE 10 MEQ: 10 INJECTION, SOLUTION INTRAVENOUS at 09:03

## 2019-06-16 RX ADMIN — Medication 100 MG: at 09:58

## 2019-06-16 RX ADMIN — CHLORDIAZEPOXIDE HYDROCHLORIDE 25 MG: 25 CAPSULE ORAL at 03:02

## 2019-06-16 RX ADMIN — PANTOPRAZOLE SODIUM 40 MG: 40 TABLET, DELAYED RELEASE ORAL at 06:39

## 2019-06-16 RX ADMIN — LORAZEPAM 2 MG: 1 TABLET ORAL at 03:02

## 2019-06-16 RX ADMIN — LORAZEPAM 3 MG: 1 TABLET ORAL at 06:39

## 2019-06-16 RX ADMIN — ENOXAPARIN SODIUM 40 MG: 40 INJECTION, SOLUTION INTRAVENOUS; SUBCUTANEOUS at 09:57

## 2019-06-16 RX ADMIN — SODIUM CHLORIDE: 9 INJECTION, SOLUTION INTRAVENOUS at 00:04

## 2019-06-16 RX ADMIN — POTASSIUM CHLORIDE 10 MEQ: 10 INJECTION, SOLUTION INTRAVENOUS at 20:42

## 2019-06-16 RX ADMIN — CHLORDIAZEPOXIDE HYDROCHLORIDE 25 MG: 25 CAPSULE ORAL at 09:57

## 2019-06-16 RX ADMIN — THERA TABS 1 TABLET: TAB at 09:58

## 2019-06-16 RX ADMIN — LORAZEPAM 2 MG: 2 INJECTION INTRAMUSCULAR; INTRAVENOUS at 01:00

## 2019-06-16 RX ADMIN — MAGNESIUM SULFATE HEPTAHYDRATE 1 G: 1 INJECTION, SOLUTION INTRAVENOUS at 12:00

## 2019-06-16 RX ADMIN — QUETIAPINE FUMARATE 300 MG: 300 TABLET ORAL at 20:42

## 2019-06-16 RX ADMIN — BUPROPION HYDROCHLORIDE 150 MG: 150 TABLET, FILM COATED, EXTENDED RELEASE ORAL at 09:58

## 2019-06-16 RX ADMIN — MIDODRINE HYDROCHLORIDE 2.5 MG: 2.5 TABLET ORAL at 06:39

## 2019-06-16 RX ADMIN — MIDODRINE HYDROCHLORIDE 5 MG: 5 TABLET ORAL at 15:42

## 2019-06-16 ASSESSMENT — ENCOUNTER SYMPTOMS
ABDOMINAL PAIN: 0
EYE PAIN: 0
TROUBLE SWALLOWING: 0
ABDOMINAL DISTENTION: 0
NAUSEA: 0
VOMITING: 0
SHORTNESS OF BREATH: 0
DIARRHEA: 0
COUGH: 0
WHEEZING: 0
CHEST TIGHTNESS: 0
BLOOD IN STOOL: 0
CONSTIPATION: 0

## 2019-06-16 NOTE — PLAN OF CARE
Problem: Falls - Risk of:  Goal: Will remain free from falls  Description  Will remain free from falls  Outcome: Ongoing  Goal: Absence of physical injury  Description  Absence of physical injury  Outcome: Ongoing     Problem: Nutrition  Goal: Optimal nutrition therapy  Outcome: Ongoing     Problem: Pain:  Goal: Pain level will decrease  Description  Pain level will decrease  Outcome: Ongoing  Goal: Control of acute pain  Description  Control of acute pain  Outcome: Ongoing  Goal: Control of chronic pain  Description  Control of chronic pain  Outcome: Ongoing

## 2019-06-16 NOTE — PROGRESS NOTES
Known Problems Brother     No Known Problems Maternal Grandmother     No Known Problems Maternal Grandfather     No Known Problems Paternal Grandmother     No Known Problems Paternal Grandfather     No Known Problems Daughter     Depression Daughter     No Known Problems Son        Medications: Allergies: Allergies   Allergen Reactions    Pcn [Penicillins] Rash       Current Meds:   Scheduled Meds:   midodrine  5 mg Oral TID WC    magnesium sulfate  1 g Intravenous Once    pantoprazole  40 mg Oral BID AC    sodium chloride flush  10 mL Intravenous 2 times per day    thiamine  100 mg Oral Daily    folic acid  1 mg Oral Daily    multivitamin  1 tablet Oral Daily    sodium chloride flush  10 mL Intravenous 2 times per day    nicotine  1 patch Transdermal Daily    enoxaparin  40 mg Subcutaneous Daily    chlordiazePOXIDE  25 mg Oral TID    QUEtiapine  300 mg Oral BID    buPROPion  150 mg Oral Daily     Continuous Infusions:    sodium chloride 75 mL/hr at 06/16/19 0004     PRN Meds: sodium chloride flush, LORazepam **OR** LORazepam **OR** LORazepam **OR** LORazepam **OR** LORazepam **OR** LORazepam **OR** LORazepam **OR** LORazepam, docusate sodium, traZODone, sodium chloride flush, potassium chloride **OR** potassium alternative oral replacement **OR** potassium chloride, ondansetron, acetaminophen, labetalol      Review of Systems:     Review of Systems   Constitutional: Negative for chills, diaphoresis and fever. HENT: Negative for trouble swallowing. Eyes: Negative for pain and visual disturbance. Respiratory: Negative for cough, chest tightness, shortness of breath and wheezing. Cardiovascular: Negative for chest pain, palpitations and leg swelling. Gastrointestinal: Negative for abdominal distention, abdominal pain, blood in stool, constipation, diarrhea, nausea and vomiting. Genitourinary: Negative for dysuria and hematuria.    Neurological: Negative for dizziness, tremors, light-headedness, numbness and headaches. Psychiatric/Behavioral: The patient is not nervous/anxious. Physical Examination:        Vitals:  BP (!) 108/98   Pulse 77   Temp 98.3 °F (36.8 °C) (Oral)   Resp 16   Ht 5' 9\" (1.753 m)   Wt 144 lb 8 oz (65.5 kg)   SpO2 98%   BMI 21.34 kg/m²   Temp (24hrs), Av.1 °F (36.2 °C), Min:96.7 °F (35.9 °C), Max:98.3 °F (36.8 °C)    No results for input(s): POCGLU in the last 72 hours. Physical Exam   Constitutional: He is oriented to person, place, and time. No distress. HENT:   Head: Normocephalic and atraumatic. Eyes: Conjunctivae are normal.   Cardiovascular: Regular rhythm and normal heart sounds. Pulmonary/Chest: Effort normal and breath sounds normal.   Abdominal: Soft. Bowel sounds are normal. He exhibits no distension. There is no tenderness. Musculoskeletal: Normal range of motion. He exhibits no edema. Neurological: He is alert and oriented to person, place, and time. Skin: Skin is warm and dry. Capillary refill takes less than 2 seconds. He is not diaphoretic. Data:     I/O (24Hr): Intake/Output Summary (Last 24 hours) at 2019 1205  Last data filed at 2019 1102  Gross per 24 hour   Intake --   Output 1670 ml   Net -1670 ml       Labs:    WBC 6.3 k/uL   RBC 3.24Low m/uL   Hemoglobin 10.1Low g/dL   Hematocrit 32.6Low %   .6 fL   MCH 31.2 pg   MCHC 31.0 g/dL   RDW 12.8 %   Platelets 988 k/uL   MPV 11.0 fL     Calcium, Ion 1.05Low mmol/L     TSH 1.14 mIU/L   Phosphorus 1.9Low mg/dL     Glucose 87 mg/dL   BUN 11 mg/dL   CREATININE 0.56Low mg/dL   Calcium 7.1Low mg/dL   Sodium 138 mmol/L   Potassium 3.5Low mmol/L   Chloride 109High mmol/L   CO2 20 mmol/L   Anion Gap 9 mmol/L   Alkaline Phosphatase 41 U/L   ALT 8 U/L   AST 12 U/L   Total Bilirubin 0.48 mg/dL   Total Protein 5.1Low g/dL   Alb 3.0Low g/dL   Albumin/Globulin Ratio 1.   GFR Non-African American >60 mL/min       Lab Results   Component Value Date/Time SPECIAL LT HAND 2ML 06/15/2019 02:03 PM     Lab Results   Component Value Date/Time    CULTURE NO GROWTH 20 HOURS 06/15/2019 02:03 PM        Assessment:        Primary Problem  Principal Problem:    Alcohol withdrawal syndrome, uncomplicated (HCC)  Active Problems:    Gastritis    Anxiety    Essential hypertension    Hypokalemia    Severe major depression without psychotic features (HCC)    Moderate malnutrition ([address]  Resolved Problems:    * No resolved hospital problems.  *      Plan       Patient is improving, tremors have improved, feeling anxious, will get psychiatry input  No more abdominal pain  , continue thiamine folic acid  Continue Protonix  Replace potassium  Patient had low normal cortisol, cosyntropin stim elation test was negative,  Repeat cortisol in the morning  Continue midodrine   potassium and magnesium        IP CONSULT TO HOSPITALIST  IP CONSULT TO DIETITIAN  IP CONSULT TO SOCIAL WORK  IP CONSULT TO Frances  IP CONSULT TO Rosario Guerrero MD    6/16/2019    12:05 PM

## 2019-06-16 NOTE — PROGRESS NOTES
Spoke with Dr. Roxana Guillaume and was told to hang the mag sulfate after the third bag of potassium.

## 2019-06-17 VITALS
TEMPERATURE: 98 F | OXYGEN SATURATION: 96 % | HEART RATE: 94 BPM | HEIGHT: 69 IN | WEIGHT: 144.5 LBS | RESPIRATION RATE: 17 BRPM | DIASTOLIC BLOOD PRESSURE: 65 MMHG | BODY MASS INDEX: 21.4 KG/M2 | SYSTOLIC BLOOD PRESSURE: 111 MMHG

## 2019-06-17 LAB
ALDOSTERONE COMMENT: NORMAL
ALDOSTERONE: <3 NG/DL
ANION GAP SERPL CALCULATED.3IONS-SCNC: 17 MMOL/L (ref 9–17)
BUN BLDV-MCNC: 7 MG/DL (ref 6–20)
BUN/CREAT BLD: ABNORMAL (ref 9–20)
CALCIUM SERPL-MCNC: 9.1 MG/DL (ref 8.6–10.4)
CHLORIDE BLD-SCNC: 103 MMOL/L (ref 98–107)
CO2: 23 MMOL/L (ref 20–31)
CORTISOL COLLECTION INFO: NORMAL
CORTISOL: 6.2 UG/DL (ref 2.7–18.4)
CREAT SERPL-MCNC: 0.62 MG/DL (ref 0.7–1.2)
GFR AFRICAN AMERICAN: >60 ML/MIN
GFR NON-AFRICAN AMERICAN: >60 ML/MIN
GFR SERPL CREATININE-BSD FRML MDRD: ABNORMAL ML/MIN/{1.73_M2}
GFR SERPL CREATININE-BSD FRML MDRD: ABNORMAL ML/MIN/{1.73_M2}
GLUCOSE BLD-MCNC: 111 MG/DL (ref 70–99)
LV EF: 58 %
LVEF MODALITY: NORMAL
POTASSIUM SERPL-SCNC: 3.8 MMOL/L (ref 3.7–5.3)
POTASSIUM SERPL-SCNC: 4 MMOL/L (ref 3.7–5.3)
RENIN ACTIVITY: 0.5 NG/ML/HR
RENIN COMMENT: NORMAL
SODIUM BLD-SCNC: 143 MMOL/L (ref 135–144)

## 2019-06-17 PROCEDURE — 82533 TOTAL CORTISOL: CPT

## 2019-06-17 PROCEDURE — 6370000000 HC RX 637 (ALT 250 FOR IP): Performed by: INTERNAL MEDICINE

## 2019-06-17 PROCEDURE — 93306 TTE W/DOPPLER COMPLETE: CPT

## 2019-06-17 PROCEDURE — 6370000000 HC RX 637 (ALT 250 FOR IP): Performed by: FAMILY MEDICINE

## 2019-06-17 PROCEDURE — 99238 HOSP IP/OBS DSCHRG MGMT 30/<: CPT | Performed by: INTERNAL MEDICINE

## 2019-06-17 PROCEDURE — 6370000000 HC RX 637 (ALT 250 FOR IP): Performed by: PHYSICIAN ASSISTANT

## 2019-06-17 PROCEDURE — 90792 PSYCH DIAG EVAL W/MED SRVCS: CPT | Performed by: PSYCHIATRY & NEUROLOGY

## 2019-06-17 PROCEDURE — 36415 COLL VENOUS BLD VENIPUNCTURE: CPT

## 2019-06-17 PROCEDURE — 6360000002 HC RX W HCPCS: Performed by: PHYSICIAN ASSISTANT

## 2019-06-17 PROCEDURE — 80048 BASIC METABOLIC PNL TOTAL CA: CPT

## 2019-06-17 RX ORDER — CHLORDIAZEPOXIDE HYDROCHLORIDE 10 MG/1
10 CAPSULE, GELATIN COATED ORAL 2 TIMES DAILY
Status: DISCONTINUED | OUTPATIENT
Start: 2019-06-17 | End: 2019-06-17 | Stop reason: HOSPADM

## 2019-06-17 RX ORDER — CHLORDIAZEPOXIDE HYDROCHLORIDE 10 MG/1
10 CAPSULE, GELATIN COATED ORAL 2 TIMES DAILY
Status: DISCONTINUED | OUTPATIENT
Start: 2019-06-17 | End: 2019-06-17

## 2019-06-17 RX ADMIN — MIDODRINE HYDROCHLORIDE 5 MG: 5 TABLET ORAL at 16:24

## 2019-06-17 RX ADMIN — CHLORDIAZEPOXIDE HYDROCHLORIDE 10 MG: 10 CAPSULE ORAL at 12:36

## 2019-06-17 RX ADMIN — Medication 100 MG: at 09:57

## 2019-06-17 RX ADMIN — ENOXAPARIN SODIUM 40 MG: 40 INJECTION, SOLUTION INTRAVENOUS; SUBCUTANEOUS at 09:58

## 2019-06-17 RX ADMIN — PANTOPRAZOLE SODIUM 40 MG: 40 TABLET, DELAYED RELEASE ORAL at 16:20

## 2019-06-17 RX ADMIN — QUETIAPINE FUMARATE 300 MG: 300 TABLET ORAL at 09:57

## 2019-06-17 RX ADMIN — CHLORDIAZEPOXIDE HYDROCHLORIDE 25 MG: 25 CAPSULE ORAL at 03:20

## 2019-06-17 RX ADMIN — MIDODRINE HYDROCHLORIDE 5 MG: 5 TABLET ORAL at 12:36

## 2019-06-17 RX ADMIN — PANTOPRAZOLE SODIUM 40 MG: 40 TABLET, DELAYED RELEASE ORAL at 06:19

## 2019-06-17 RX ADMIN — THERA TABS 1 TABLET: TAB at 09:57

## 2019-06-17 RX ADMIN — MIDODRINE HYDROCHLORIDE 5 MG: 5 TABLET ORAL at 09:58

## 2019-06-17 RX ADMIN — FOLIC ACID 1 MG: 1 TABLET ORAL at 09:58

## 2019-06-17 RX ADMIN — BUPROPION HYDROCHLORIDE 150 MG: 150 TABLET, FILM COATED, EXTENDED RELEASE ORAL at 09:57

## 2019-06-17 ASSESSMENT — ENCOUNTER SYMPTOMS
COUGH: 0
NAUSEA: 0
ABDOMINAL PAIN: 0
VOMITING: 0
CHEST TIGHTNESS: 0
SHORTNESS OF BREATH: 0
DIARRHEA: 0

## 2019-06-17 NOTE — CARE COORDINATION
Discharge [id number] [facility]  Clinical Case Management Department  Written by:  Jeremy Llanos, RN    Patient Name: Amada Blankenship  Attending Provider: Abimbola Yu MD  Admit Date: 2019  7:52 AM  MRN: [id number]  Account: [de-identified]                     : 1970  Discharge Date: 19      Disposition: Return to [address] [place] [address],, RN

## 2019-06-17 NOTE — DISCHARGE SUMMARY
Roselia Martines 19    Discharge Summary     Patient ID: Virgilio Bond  :  1970   MRN: [id number]     ACCOUNT:  [de-identified]   Patient's PCP: . None (Inactive)  Admit Date: 2019   Discharge Date: 2019   Length of Stay: 4  Code Status:  Full Code  Admitting Physician: Aaron Houston MD  Discharge Physician: Sherwin Mckeon PA-C     Active Discharge Diagnoses:     Hospital Problem Lists:  Principal Problem (Resolved):    Alcohol withdrawal syndrome, uncomplicated ([address]  Active Problems:    Anxiety    Essential hypertension    Severe major depression without psychotic features ([address]    Moderate malnutrition ([facility]  Resolved Problems:    Gastritis    Hypokalemia      Admission Condition:  poor     Discharged Condition: fair    Hospital Stay:     Hospital Course: The patient is a 51 year-old male with a history of alcohol dependence and previous seizures associated with alcohol withdrawal. Most recently he was admitted from 2019 - 2019 for alcohol withdrawal and hematemesis and underwent EGD, which was significant for gastritis. He signed out [facility] on  and has since been living at a rehabilitation facility ([facility]). He reports that he last had a fifth of alcohol 3 days ago and started experiencing withdrawal symptoms within 24 hours of cessation. This morning he had worsening tremors, diaphoresis, vomiting, and anxiety prompting him to seek medical attention.      ED evaluation was essentially unremarkable. Hemoglobin normal. Ethanol undetectable. Potassium 3.5 otherwise no significant electrolyte abnormalities. Admitted to medicine for management of his withdrawal.     Potassium corrected. Started on protonix IV. Treated based on CIWA protocol and scheduled librium. Intermittent hypotension throughout hospitalization. Echo unremarkable. Corticotropin stimulation test unrevealing. Started on midodrine.  Discharged home with prescription for midodrine and librium taper. Psych consulted, but pt did not actively participate in interview; therefore, no further recommendations made. Recommend outpatient follow-up with endocrinology for further work-up of transient hypotension.     Significant therapeutic interventions: See above    Significant Diagnostic Studies:   Labs / Micro:  CBC:   Lab Results   Component Value Date    WBC 5.2 06/16/2019    RBC 3.48 06/16/2019    HGB 11.0 06/16/2019    HCT 33.5 06/16/2019    MCV 96.3 06/16/2019    MCH 31.6 06/16/2019    MCHC 32.8 06/16/2019    RDW 12.3 06/16/2019     06/16/2019     BMP:    Lab Results   Component Value Date    GLUCOSE 111 06/17/2019     06/17/2019    K 4.0 06/17/2019     06/17/2019    CO2 23 06/17/2019    ANIONGAP 17 06/17/2019    BUN 7 06/17/2019    CREATININE 0.62 06/17/2019    BUNCRER NOT REPORTED 06/17/2019    CALCIUM 9.1 06/17/2019    LABGLOM >60 06/17/2019    GFRAA >60 06/17/2019    GFR      06/17/2019    GFR NOT REPORTED 06/17/2019     HFP:    Lab Results   Component Value Date    PROT 5.1 06/14/2019     CMP:    Lab Results   Component Value Date    GLUCOSE 111 06/17/2019     06/17/2019    K 4.0 06/17/2019     06/17/2019    CO2 23 06/17/2019    BUN 7 06/17/2019    CREATININE 0.62 06/17/2019    ANIONGAP 17 06/17/2019    ALKPHOS 41 06/14/2019    ALT 8 06/14/2019    AST 12 06/14/2019    BILITOT 0.48 06/14/2019    LABALBU 3.0 06/14/2019    ALBUMIN 1.4 06/14/2019    LABGLOM >60 06/17/2019    GFRAA >60 06/17/2019    GFR      06/17/2019    GFR NOT REPORTED 06/17/2019    PROT 5.1 06/14/2019    CALCIUM 9.1 06/17/2019     PTT:   Lab Results   Component Value Date    APTT 22.4 06/13/2019     FLP:    Lab Results   Component Value Date    CHOL 195 04/11/2019    TRIG 62 04/11/2019    HDL 52 04/11/2019     U/A:    Lab Results   Component Value Date    COLORU YELLOW 06/15/2019    TURBIDITY CLEAR 06/15/2019    SPECGRAV 1.011 06/15/2019    HGBUR NEGATIVE 06/15/2019    PHUR 5.5 06/15/2019    PROTEINU NEGATIVE 06/15/2019    GLUCOSEU NEGATIVE 06/15/2019    KETUA SMALL 06/15/2019    BILIRUBINUR NEGATIVE 06/15/2019    UROBILINOGEN Normal 06/15/2019    NITRU NEGATIVE 06/15/2019    LEUKOCYTESUR NEGATIVE 06/15/2019     TSH:    Lab Results   Component Value Date    TSH 1.14 06/14/2019     Radiology:  Ct Abdomen Pelvis Wo Contrast Additional Contrast? None    Result Date: 6/15/2019  1. Small amount of free pelvic fluid of indeterminate etiology. There are also trace bilateral pleural effusions. 2. Otherwise unremarkable CT of the abdomen and pelvis without contrast.  No evidence of obstructive uropathy or appendicitis. 3. Mild distention of the distal esophagus, possibly gastroesophageal reflux. Consultations:    Consults:     Final Specialist Recommendations/Findings:   IP CONSULT TO HOSPITALIST  IP CONSULT TO DIETITIAN  IP CONSULT TO SOCIAL WORK  IP CONSULT TO SPIRITUAL SERVICES  IP CONSULT TO PSYCHIATRY      The patient was seen and examined on day of discharge and this discharge summary is in conjunction with any daily progress note from day of discharge. Discharge plan:     Disposition: Outpatient Therapy    Physician Follow Up:     .  None          Jas Martell MD  [address] [place] [address]  [phone]        Requiring Further Evaluation/Follow Up POST HOSPITALIZATION/Incidental Findings:     Diet: regular diet    Activity: As tolerated    Instructions to Patient:     Discharge Medications:      Medication List      START taking these medications    chlordiazePOXIDE 5 MG capsule  Commonly known as:  LIBRIUM  Take 2 capsule 2 times daily for 2 days, then 1 capsule 2 times daily for 2 days then 1 capsule daily for 2 days     folic acid 1 MG tablet  Commonly known as:  FOLVITE  Take 1 tablet by mouth daily     midodrine 5 MG tablet  Commonly known as:  PROAMATINE  Take 1 tablet by mouth 3 times daily (with meals) Hold if systolic blood pressure is more than 110     thiamine 100 MG tablet  Take 1 tablet by mouth daily        CHANGE how you take these medications    QUEtiapine 300 MG tablet  Commonly known as:  SEROQUEL  Take 1 tablet by mouth nightly  What changed:  when to take this        CONTINUE taking these medications    buPROPion 150 MG extended release tablet  Commonly known as:  WELLBUTRIN XL     docusate sodium 100 MG capsule  Commonly known as:  COLACE  Take 1 capsule by mouth daily as needed for Constipation     multivitamin tablet  Take 1 tablet by mouth daily     ondansetron 4 MG tablet  Commonly known as:  ZOFRAN  Take 1 tablet by mouth every 8 hours as needed for Nausea or Vomiting     pantoprazole 40 MG tablet  Commonly known as:  PROTONIX  Take 1 tablet by mouth every morning (before breakfast)     traZODone 50 MG tablet  Commonly known as:  DESYREL  Take 1 tablet by mouth nightly as needed for Sleep           Where to Get Your Medications      These medications were sent to [facility], [address] [place] [address], [address] [facility] [facility] [address]    Phone:  [phone]   · docusate sodium 100 MG capsule  · folic acid 1 MG tablet  · midodrine 5 MG tablet  · ondansetron 4 MG tablet  · thiamine 100 MG tablet     You can get these medications from any pharmacy    Bring a paper prescription for each of these medications  · chlordiazePOXIDE 5 MG capsule         Time Spent on discharge is  35 mins in patient examination, evaluation, counseling as well as medication reconciliation, prescriptions for required medications, discharge plan and follow up. Electronically signed by   Nabil Benoit PA-C  6/17/2019  5:09 PM      Thank you  . Chelsea (Inactive) for the opportunity to be involved in this patient's care.

## 2019-06-17 NOTE — PROGRESS NOTES
Occupational Therapy Not Seen Note    DATE: 2019  Name: Ally Lozada  : 1970  MRN: [id number]    Patient not available for Occupational Therapy due to:    Testing : pt off unit for test    Next Scheduled Treatment: check back later as able or 19    Electronically signed by Wava Apley, OTR/L on 2019 at 9:19 AM

## 2019-06-17 NOTE — PROGRESS NOTES
Pt's IV removed without complications, pt given discharged instructions, and verbalized understanding. Pt ambulated downstairs with nurse assistance with all belongings.  Pt waiting for ride to [facility]

## 2019-06-17 NOTE — VIRTUAL HEALTH
Inpatient consult to Psychiatry  Consult performed by: Danielle Correa MD  Consult ordered by: Jovon Irene MD  Reason for consult: Etoh Use Disorder, Anxiety        Patient Location:  [address] 4[address] Onc/Med Surg    Provider Location ([facility]/State):   [place], [place]    This virtual visit was conducted via interactive/real-time audio/video. Department of Psychiatry  Attending Psychiatric Assessment       CHIEF COMPLAINT:  RFC: Anxiety and ETOH use disorder    History obtained from:  patient, electronic medical record    HISTORY OF PRESENT ILLNESS:          The patient is a 47 y.o. male with significant past psych hx of Etoh use disorder, depression and anxiety. He was admitted on 6/13. He said that he hadn't had any etoh in 3 days prior to admission. He started having tremors and decided to come into the hospital.    Pt is currently going to [place] for psych services. He said that he has been going there for 2 months. Currently prescribed Seroquel 300mg BID, Trazodone 50mg HS and Wellbutrin XL 150mg QAM. Pt said that he feels like the medicine is helping. Pt said that he has been feeling better since being in the hospital. He said that his main problem right now is he is very tired. He said that he is getting woken up every 15 mins and can't get any sleep. He said that he is still vomiting some but denied other detox sxs. Dulce Maria Huber was not cooperative today in the interview. At first he had his back to me and was eating applesauce. Then he laid down in the bed and turned away from the camera. He said that he really didn't have anything to talk to me about today and just wanted to sleep. He said that he isn't depressed or anxious. He denies any SI/HI. Pt didn't wish to talk anymore with provider at that point    PSYCHIATRIC HISTORY:      The patient is currently receiving care for the above psychiatric illness. [facility] for the past two months.  In and out of treatment before that.    Pt didn't give anymore hx. Past Medical History:        Diagnosis Date    Alcohol abuse 6/5/2014    GI bleed 6/5/2014    Hematemesis 6/5/2014    Hypertension     Per patient BP only increases when he drinks alcohol.  Intractable vomiting 6/5/2014    Suicidal behavior 12/13/2018     Past Surgical History:        Procedure Laterality Date    HERNIA REPAIR      left    HERNIA REPAIR Right 5/16/2019    XI ROBOTIC LAPAROSCOPIC  INGUINAL HERNIA REPAIR WITH MESH, POSSIBLE BILATERAL, POSSIBLE OPEN performed by Trisha Hills IV, DO at [address] [place] [address]  05/2019    With Mesh    UPPER GASTROINTESTINAL ENDOSCOPY  6-5-14    gastritis, esophagitis, ulcers-koko    UPPER GASTROINTESTINAL ENDOSCOPY N/A 5/30/2019    EGD BIOPSY performed by Chanelle Reynolds MD at [place] [address]     Medications Prior to Admission:   Medications Prior to Admission: buPROPion (WELLBUTRIN XL) 150 MG extended release tablet, Take 150 mg by mouth every morning  pantoprazole (PROTONIX) 40 MG tablet, Take 1 tablet by mouth every morning (before breakfast)  traZODone (DESYREL) 50 MG tablet, Take 1 tablet by mouth nightly as needed for Sleep  QUEtiapine (SEROQUEL) 300 MG tablet, Take 1 tablet by mouth nightly (Patient taking differently: Take 300 mg by mouth 2 times daily )  Multiple Vitamin (MULTIVITAMIN) tablet, Take 1 tablet by mouth daily  [DISCONTINUED] docusate sodium (COLACE) 100 MG capsule, Take 1 capsule by mouth daily as needed for Constipation  [DISCONTINUED] QUEtiapine (SEROQUEL XR) 150 MG TB24 extended release tablet, Take 150 mg by mouth daily  [DISCONTINUED] escitalopram (LEXAPRO) 20 MG tablet, Take 1 tablet by mouth daily  Allergies:  Pcn [penicillins]    Social History:  Currently staying at [facility]. Denies any nicotine or drug use.  Was drinking a 1/5 of liquor daily    Family History:       Adopted: Yes   Problem Relation Age of Onset    Asthma Mother     Crohn's Disease Mother     No Known Problems Father     Other Sister         Fibromyalgia    No Known Problems Brother     No Known Problems Maternal Grandmother     No Known Problems Maternal Grandfather     No Known Problems Paternal Grandmother     No Known Problems Paternal Grandfather     No Known Problems Daughter     Depression Daughter     No Known Problems Son      Psychiatric Family History  Patient is unable to give family history at this time.     REVIEW OF SYSTEMS:  BEHAVIOR/PSYCH:  positive for decreased sleep    PHYSICAL EXAM:    Vitals:  /68   Pulse 75   Temp 98 °F (36.7 °C) (Oral)   Resp 17   Ht 5' 9\" (1.753 m)   Wt 144 lb 8 oz (65.5 kg)   SpO2 97%   BMI 21.34 kg/m²     Mental Status Examination:  Level of consciousness:  within normal limits  Appearance:  hospital attire and lying in bed  Behavior/Motor:  Stayed facing away from  Attitude toward examiner:  guarded and uncooperative, turned away from provider  Speech:  spontaneous, normal rate, normal volume and well articulated  Mood:  \"tired\"  Affect:  mood congruent  Thought processes:  linear, goal directed and coherent  Thought content:  Suicidal Ideation:  denies suicidal ideation  Cognition:  Insight:  poor  Judgment:  poor    CONSTITUTIONAL:  awake, alert, cooperative, no apparent distress, and appears stated age    DATA:  Labs:    CBC:   Lab Results   Component Value Date    WBC 5.2 06/16/2019    RBC 3.48 06/16/2019    HGB 11.0 06/16/2019    HCT 33.5 06/16/2019    MCV 96.3 06/16/2019    MCH 31.6 06/16/2019    MCHC 32.8 06/16/2019    RDW 12.3 06/16/2019     06/16/2019    MPV 10.9 06/16/2019     CMP:    Lab Results   Component Value Date     06/16/2019    K 3.8 06/16/2019     06/16/2019    CO2 23 06/16/2019    BUN 6 06/16/2019    CREATININE 0.59 06/16/2019    GFRAA >60 06/16/2019    LABGLOM >60 06/16/2019    GLUCOSE 114 06/16/2019    PROT 5.1 06/14/2019    LABALBU 3.0 06/14/2019    CALCIUM 8.5 06/16/2019    BILITOT 0.48 06/14/2019 ALKPHOS 41 06/14/2019    AST 12 06/14/2019    ALT 8 06/14/2019     HgBA1c:    Lab Results   Component Value Date    LABA1C 4.7 04/11/2019     FLP:    Lab Results   Component Value Date    TRIG 62 04/11/2019    HDL 52 04/11/2019     TSH:    Lab Results   Component Value Date    TSH 1.14 06/14/2019     VITAMIN B12: No components found for: B12  FOLATE:    Lab Results   Component Value Date    FOLATE >20.0 05/29/2019     Urine Toxicology:  No components found for: IAMMENTA, IBARBIT, IBENZO, ICOCAINE, IMARTHC, IOPIATES, IPHENCYC    DSM-V DIAGNOSIS:    Impression     ASSESSMENT AND PLAN:    Etoh Use Disorder, Severe  MDD  Unspecified Anxiety Disorder    1. Pt did not wish to participate in the evaluation past the first few minutes. As such I am unable to give much insight or recommendations. He is not suicidal or has any thoughts of self harm does not need emergency commitment. 2. He denies any current anxiety or depression, thus I do not recommend any changes in his medication at this time  3. Pt has outpt mental health provider at [facility] and has an appointment already scheduled for the end of the month according to prior documentation, so no new appointment needed    Thank you for the consult.  If you have any further questions or new issues arise please reconsult

## 2019-06-17 NOTE — PROGRESS NOTES
[address] [facility]    Progress Note    6/17/2019    4:59 PM    Name:   Jose Sheriff  MRN:     [id number]     Acct:      [de-identified]   Room:   [facility]8080[facility] Day:  4  Admit Date:  6/13/2019  7:52 AM    PCP:   . None (Inactive)  Code Status:  Full Code    Subjective:     C/C:   Chief Complaint   Patient presents with    Withdrawal     Withdrawal symptoms. no drink for past 3 days     Interval History Status:  No new complaints. No longer requiring ativan. Eating and drinking well. Ready for discharge. Brief History:     The patient is a 51 year-old male with a history of alcohol dependence and previous seizures associated with alcohol withdrawal. Most recently he was admitted from 5/28/2019 - 5/30/2019 for alcohol withdrawal and hematemesis and underwent EGD, which was significant for gastritis. He signed out [facility] [employer] on 5/30 and has since been living at a rehabilitation facility ([facility]). He reports that he last had a fifth of alcohol 3 days ago and started experiencing withdrawal symptoms within 24 hours of cessation. This morning he had worsening tremors, diaphoresis, vomiting, and anxiety prompting him to seek medical attention.      ED evaluation was essentially unremarkable. Hemoglobin normal. Ethanol undetectable. Potassium 3.5 otherwise no significant electrolyte abnormalities. Admitted to medicine for management of his withdrawal.    Potassium corrected. Treated based on CIWA protocol and scheduled librium. Intermittent hypotension throughout hospitalization. Echo unremarkable. Corticotropin stimulation test unrevealing. Started on midodrine. Discharged home with prescription for midodrine and librium taper. Psych consulted, but pt did not actively participate in interview; therefore, no further recommendations made. Recommend outpatient follow-up with endocrinology for further work-up of transient hypotension.     Past Medical History: has a past medical history of Alcohol abuse, GI bleed, Hematemesis, Hypertension, Intractable vomiting, and Suicidal behavior. Social History:   reports that he has never smoked. He has never used smokeless tobacco. He reports that he drank alcohol. He reports that he does not use drugs. Family History:   Family History   Adopted: Yes   Problem Relation Age of Onset    Asthma Mother     Crohn's Disease Mother     No Known Problems Father     Other Sister         Fibromyalgia    No Known Problems Brother     No Known Problems Maternal Grandmother     No Known Problems Maternal Grandfather     No Known Problems Paternal Grandmother     No Known Problems Paternal Grandfather     No Known Problems Daughter     Depression Daughter     No Known Problems Son        Medications: Allergies: Allergies   Allergen Reactions    Pcn [Penicillins] Rash       Current Meds:   Scheduled Meds:   chlordiazePOXIDE  10 mg Oral BID    midodrine  5 mg Oral TID WC    pantoprazole  40 mg Oral BID AC    sodium chloride flush  10 mL Intravenous 2 times per day    thiamine  100 mg Oral Daily    folic acid  1 mg Oral Daily    multivitamin  1 tablet Oral Daily    sodium chloride flush  10 mL Intravenous 2 times per day    nicotine  1 patch Transdermal Daily    enoxaparin  40 mg Subcutaneous Daily    QUEtiapine  300 mg Oral BID    buPROPion  150 mg Oral Daily     Continuous Infusions:   PRN Meds: sodium chloride flush, LORazepam **OR** LORazepam **OR** LORazepam **OR** LORazepam **OR** LORazepam **OR** LORazepam **OR** LORazepam **OR** LORazepam, docusate sodium, traZODone, sodium chloride flush, potassium chloride **OR** potassium alternative oral replacement **OR** potassium chloride, ondansetron, acetaminophen, labetalol      Review of Systems:     Review of Systems   Constitutional: Negative for chills, diaphoresis and fever. Respiratory: Negative for cough, chest tightness and shortness of breath. Cardiovascular: Negative for chest pain. Gastrointestinal: Negative for abdominal pain, diarrhea, nausea and vomiting. Genitourinary: Negative for dysuria and hematuria. Neurological: Negative for dizziness, light-headedness and headaches. Physical Examination:        Vitals:  /65   Pulse 94   Temp 98 °F (36.7 °C) (Oral)   Resp 17   Ht 5' 9\" (1.753 m)   Wt 144 lb 8 oz (65.5 kg)   SpO2 96%   BMI 21.34 kg/m²   Temp (24hrs), Av.8 °F (36.6 °C), Min:97.7 °F (36.5 °C), Max:98 °F (36.7 °C)    No results for input(s): POCGLU in the last 72 hours. Physical Exam   Constitutional: He is oriented to person, place, and time. No distress. HENT:   Mouth/Throat: Oropharynx is clear and moist.   Eyes: Conjunctivae are normal.   Neck: Neck supple. Cardiovascular: Normal rate and regular rhythm. Pulmonary/Chest: Effort normal and breath sounds normal.   Abdominal: Soft. Bowel sounds are normal. There is no tenderness. Musculoskeletal: He exhibits no edema. Neurological: He is alert and oriented to person, place, and time. Abnormal reflex:    Skin: Skin is warm and dry. Capillary refill takes less than 2 seconds. He is not diaphoretic. Psychiatric: He has a normal mood and affect. Data:     I/O (24Hr): Intake/Output Summary (Last 24 hours) at 2019 1659  Last data filed at 2019 0456  Gross per 24 hour   Intake 4260 ml   Output 1200 ml   Net 3060 ml       Lab Results   Component Value Date/Time    SPECIAL LT HAND 2ML 06/15/2019 02:03 PM     Lab Results   Component Value Date/Time    CULTURE NO GROWTH 2 DAYS 06/15/2019 02:03 PM       Radiology / Reji Robertson:    Transthoracic Echocardiography Report (TTE)       CONCLUSIONS    Summary  Low cardiac filling pressures suggestive of hypovolemia  Normal left and right ventricular size and function. Estimated ejection fraction is 55-60%. No significant valvular regurgitation or stenosis seen.   No evidence of pericardial

## 2019-06-18 LAB — ADRENOCORTICOTROPIC HORMONE: <5 PG/ML (ref 7–69)

## 2019-08-29 ENCOUNTER — OFFICE VISIT (OUTPATIENT)
Dept: PRIMARY CARE CLINIC | Age: 49
End: 2019-08-29
Payer: COMMERCIAL

## 2019-08-29 VITALS
WEIGHT: 135 LBS | HEART RATE: 82 BPM | OXYGEN SATURATION: 99 % | DIASTOLIC BLOOD PRESSURE: 89 MMHG | BODY MASS INDEX: 19.94 KG/M2 | SYSTOLIC BLOOD PRESSURE: 136 MMHG | TEMPERATURE: 97.5 F

## 2019-08-29 DIAGNOSIS — J01.00 ACUTE NON-RECURRENT MAXILLARY SINUSITIS: Primary | ICD-10-CM

## 2019-08-29 PROCEDURE — 99203 OFFICE O/P NEW LOW 30 MIN: CPT | Performed by: NURSE PRACTITIONER

## 2019-08-29 PROCEDURE — G8420 CALC BMI NORM PARAMETERS: HCPCS | Performed by: NURSE PRACTITIONER

## 2019-08-29 PROCEDURE — 1036F TOBACCO NON-USER: CPT | Performed by: NURSE PRACTITIONER

## 2019-08-29 PROCEDURE — G8427 DOCREV CUR MEDS BY ELIG CLIN: HCPCS | Performed by: NURSE PRACTITIONER

## 2019-08-29 PROCEDURE — 3017F COLORECTAL CA SCREEN DOC REV: CPT | Performed by: NURSE PRACTITIONER

## 2019-08-29 RX ORDER — AZITHROMYCIN 250 MG/1
TABLET, FILM COATED ORAL
Qty: 1 PACKET | Refills: 0 | Status: ON HOLD | OUTPATIENT
Start: 2019-08-29 | End: 2019-12-05 | Stop reason: HOSPADM

## 2019-08-29 RX ORDER — FLUTICASONE PROPIONATE 50 MCG
2 SPRAY, SUSPENSION (ML) NASAL DAILY
Qty: 1 BOTTLE | Refills: 0 | Status: SHIPPED | OUTPATIENT
Start: 2019-08-29

## 2019-08-29 RX ORDER — LORATADINE 10 MG/1
10 TABLET ORAL DAILY
Qty: 30 TABLET | Refills: 0 | Status: SHIPPED | OUTPATIENT
Start: 2019-08-29 | End: 2019-09-28

## 2019-08-29 ASSESSMENT — ENCOUNTER SYMPTOMS
HOARSE VOICE: 0
SINUS COMPLAINT: 1
COUGH: 0
SHORTNESS OF BREATH: 0
SORE THROAT: 0
SINUS PRESSURE: 1
SWOLLEN GLANDS: 0

## 2019-08-29 NOTE — PROGRESS NOTES
[address] [facility]  [address] [facility] [address]  Dept: [phone]  Dept Fax: [phone]    Jairo Martinez is a 47 y.o. male who presents to the urgent care today for his medical conditions/complaints as notedbelow. Jairo Martinez is c/o of Sinus Problem (ongoing for about 1 month )      HPI:     47 yr old male presents with c/o sinus pressure and congestion for 1 month. Hx seasonal allergies, tried benadryl - usually works well for him - no relief. Sinus Problem   This is a new problem. The current episode started 1 to 4 weeks ago. The problem has been gradually worsening since onset. There has been no fever. His pain is at a severity of 2/10. The pain is mild. Associated symptoms include congestion, ear pain and sinus pressure. Pertinent negatives include no chills, coughing, diaphoresis, headaches, hoarse voice, neck pain, shortness of breath, sneezing, sore throat or swollen glands. The treatment provided no relief. Past Medical History:   Diagnosis Date    Alcohol abuse 6/5/2014    GI bleed 6/5/2014    Hematemesis 6/5/2014    Hypertension     Per patient BP only increases when he drinks alcohol.     Intractable vomiting 6/5/2014    Suicidal behavior 12/13/2018        Current Outpatient Medications   Medication Sig Dispense Refill    azithromycin (ZITHROMAX Z-EVONNE) 250 MG tablet Take 2 tablets (500 mg) on Day 1, and then take 1 tablet (250 mg) on days 2 through 5. 1 packet 0    fluticasone (FLONASE) 50 MCG/ACT nasal spray 2 sprays by Nasal route daily 1 Bottle 0    loratadine (CLARITIN) 10 MG tablet Take 1 tablet by mouth daily 30 tablet 0    QUEtiapine (SEROQUEL) 300 MG tablet Take 1 tablet by mouth nightly (Patient taking differently: Take 200 mg by mouth 2 times daily ) 30 tablet 0    ondansetron (ZOFRAN) 4 MG tablet Take 1 tablet by mouth every 8 hours as needed for Nausea or Vomiting (Patient not taking: Reported on 8/29/2019) 30 tablet 0    folic acid (FOLVITE) 1 MG tablet Take 1 tablet by mouth daily (Patient not taking: Reported on 8/29/2019) 30 tablet 3    midodrine (PROAMATINE) 5 MG tablet Take 1 tablet by mouth 3 times daily (with meals) Hold if systolic blood pressure is more than 110 (Patient not taking: Reported on 8/29/2019) 90 tablet 3    vitamin B-1 100 MG tablet Take 1 tablet by mouth daily (Patient not taking: Reported on 8/29/2019) 30 tablet 3    buPROPion (WELLBUTRIN XL) 150 MG extended release tablet Take 150 mg by mouth every morning      Multiple Vitamin (MULTIVITAMIN) tablet Take 1 tablet by mouth daily (Patient not taking: Reported on 8/29/2019)  0    pantoprazole (PROTONIX) 40 MG tablet Take 1 tablet by mouth every morning (before breakfast) (Patient not taking: Reported on 8/29/2019) 30 tablet 5    traZODone (DESYREL) 50 MG tablet Take 1 tablet by mouth nightly as needed for Sleep (Patient not taking: Reported on 8/29/2019) 30 tablet 0     No current facility-administered medications for this visit. Allergies   Allergen Reactions    Pcn [Penicillins] Rash       Subjective:      Review of Systems   Constitutional: Negative for chills and diaphoresis. HENT: Positive for congestion, ear pain and sinus pressure. Negative for hoarse voice, sneezing and sore throat. Respiratory: Negative for cough and shortness of breath. Musculoskeletal: Negative for neck pain. Neurological: Negative for headaches. All other systems reviewed and are negative. 14 systems reviewed and negative except as listed in HPI. Objective:     Physical Exam   Constitutional: He is oriented to person, place, and time. He appears well-developed and well-nourished. No distress. nontoxic   HENT:   Head: Normocephalic and atraumatic. Right Ear: External ear normal.   Left Ear: External ear normal.   Mouth/Throat: Oropharynx is clear and moist. No oropharyngeal exudate.    Bilateral maxillary sinus tenderness, no facial swelling or erythema  Bilateral nasal turbinates are boggy  Left TM pearly grey with good cone light  Right TM pearly grey with good cone light    Swallows without difficulty   Eyes: Pupils are equal, round, and reactive to light. Conjunctivae and EOM are normal. Right eye exhibits no discharge. Left eye exhibits no discharge. Neck: Normal range of motion. Neck supple. Cardiovascular: Normal rate, regular rhythm and normal heart sounds. Exam reveals no gallop and no friction rub. No murmur heard. Pulmonary/Chest: Effort normal and breath sounds normal. No stridor. No respiratory distress. He has no wheezes. He has no rales. He exhibits no tenderness. Abdominal: Soft. Bowel sounds are normal. He exhibits no distension. There is no tenderness. Musculoskeletal: Normal range of motion. He exhibits no deformity. Ambulated to and from room, gait is steady  Moving all extremities without difficulty   Lymphadenopathy:     He has no cervical adenopathy. Neurological: He is alert and oriented to person, place, and time. He exhibits normal muscle tone. Coordination normal.   Skin: Skin is warm and dry. No rash noted. He is not diaphoretic. Psychiatric: He has a normal mood and affect. Nursing note and vitals reviewed. /89 (Site: Left Upper Arm, Position: Sitting, Cuff Size: Medium Adult)   Pulse 82   Temp 97.5 °F (36.4 °C) (Oral)   Wt 135 lb (61.2 kg)   SpO2 99%   BMI 19.94 kg/m²     Assessment:       Diagnosis Orders   1. Acute non-recurrent maxillary sinusitis         Plan:      Return for Make an Appt. with your Primary Care in 1 week. Orders Placed This Encounter   Medications    azithromycin (ZITHROMAX Z-EVONNE) 250 MG tablet     Sig: Take 2 tablets (500 mg) on Day 1, and then take 1 tablet (250 mg) on days 2 through 5.      Dispense:  1 packet     Refill:  0    fluticasone (FLONASE) 50 MCG/ACT nasal spray     Si sprays by Nasal route daily     Dispense:  1 Bottle

## 2019-11-10 ENCOUNTER — HOSPITAL ENCOUNTER (EMERGENCY)
Age: 49
Discharge: PSYCHIATRIC OR SUBSTANCE USE FACILITY | End: 2019-11-10
Attending: EMERGENCY MEDICINE
Payer: COMMERCIAL

## 2019-11-10 VITALS
BODY MASS INDEX: 19.94 KG/M2 | RESPIRATION RATE: 18 BRPM | DIASTOLIC BLOOD PRESSURE: 81 MMHG | SYSTOLIC BLOOD PRESSURE: 117 MMHG | WEIGHT: 135 LBS | OXYGEN SATURATION: 95 % | TEMPERATURE: 97.3 F | HEART RATE: 63 BPM

## 2019-11-10 DIAGNOSIS — R10.33 PERIUMBILICAL ABDOMINAL PAIN: ICD-10-CM

## 2019-11-10 DIAGNOSIS — F32.A DEPRESSION, UNSPECIFIED DEPRESSION TYPE: Primary | ICD-10-CM

## 2019-11-10 LAB
ABSOLUTE EOS #: 0.09 K/UL (ref 0–0.44)
ABSOLUTE IMMATURE GRANULOCYTE: <0.03 K/UL (ref 0–0.3)
ABSOLUTE LYMPH #: 1.2 K/UL (ref 1.1–3.7)
ABSOLUTE MONO #: 0.55 K/UL (ref 0.1–1.2)
ACETAMINOPHEN LEVEL: <5 UG/ML (ref 10–30)
ALBUMIN SERPL-MCNC: 4.6 G/DL (ref 3.5–5.2)
ALBUMIN/GLOBULIN RATIO: 1.6 (ref 1–2.5)
ALP BLD-CCNC: 61 U/L (ref 40–129)
ALT SERPL-CCNC: 25 U/L (ref 5–41)
ANION GAP SERPL CALCULATED.3IONS-SCNC: 16 MMOL/L (ref 9–17)
AST SERPL-CCNC: 26 U/L
BASOPHILS # BLD: 0 % (ref 0–2)
BASOPHILS ABSOLUTE: <0.03 K/UL (ref 0–0.2)
BILIRUB SERPL-MCNC: 0.8 MG/DL (ref 0.3–1.2)
BILIRUBIN DIRECT: 0.15 MG/DL
BILIRUBIN, INDIRECT: 0.65 MG/DL (ref 0–1)
BUN BLDV-MCNC: 10 MG/DL (ref 6–20)
BUN/CREAT BLD: ABNORMAL (ref 9–20)
CALCIUM SERPL-MCNC: 9.2 MG/DL (ref 8.6–10.4)
CHLORIDE BLD-SCNC: 98 MMOL/L (ref 98–107)
CO2: 25 MMOL/L (ref 20–31)
CREAT SERPL-MCNC: 0.64 MG/DL (ref 0.7–1.2)
DIFFERENTIAL TYPE: ABNORMAL
EOSINOPHILS RELATIVE PERCENT: 2 % (ref 1–4)
ETHANOL PERCENT: 0.01 %
ETHANOL: 13 MG/DL
GFR AFRICAN AMERICAN: >60 ML/MIN
GFR NON-AFRICAN AMERICAN: >60 ML/MIN
GFR SERPL CREATININE-BSD FRML MDRD: ABNORMAL ML/MIN/{1.73_M2}
GFR SERPL CREATININE-BSD FRML MDRD: ABNORMAL ML/MIN/{1.73_M2}
GLOBULIN: NORMAL G/DL (ref 1.5–3.8)
GLUCOSE BLD-MCNC: 111 MG/DL (ref 70–99)
HCT VFR BLD CALC: 43.7 % (ref 40.7–50.3)
HEMOGLOBIN: 15.2 G/DL (ref 13–17)
IMMATURE GRANULOCYTES: 0 %
LIPASE: 34 U/L (ref 13–60)
LYMPHOCYTES # BLD: 21 % (ref 24–43)
MCH RBC QN AUTO: 32.1 PG (ref 25.2–33.5)
MCHC RBC AUTO-ENTMCNC: 34.8 G/DL (ref 28.4–34.8)
MCV RBC AUTO: 92.2 FL (ref 82.6–102.9)
MONOCYTES # BLD: 9 % (ref 3–12)
NRBC AUTOMATED: 0 PER 100 WBC
PDW BLD-RTO: 12.1 % (ref 11.8–14.4)
PLATELET # BLD: 281 K/UL (ref 138–453)
PLATELET ESTIMATE: ABNORMAL
PMV BLD AUTO: 9.6 FL (ref 8.1–13.5)
POTASSIUM SERPL-SCNC: 3.6 MMOL/L (ref 3.7–5.3)
RBC # BLD: 4.74 M/UL (ref 4.21–5.77)
RBC # BLD: ABNORMAL 10*6/UL
SALICYLATE LEVEL: <1 MG/DL (ref 3–10)
SEG NEUTROPHILS: 68 % (ref 36–65)
SEGMENTED NEUTROPHILS ABSOLUTE COUNT: 3.97 K/UL (ref 1.5–8.1)
SODIUM BLD-SCNC: 139 MMOL/L (ref 135–144)
TOTAL PROTEIN: 7.4 G/DL (ref 6.4–8.3)
TOXIC TRICYCLIC SC,BLOOD: NEGATIVE
WBC # BLD: 5.8 K/UL (ref 3.5–11.3)
WBC # BLD: ABNORMAL 10*3/UL

## 2019-11-10 PROCEDURE — 85025 COMPLETE CBC W/AUTO DIFF WBC: CPT

## 2019-11-10 PROCEDURE — 80307 DRUG TEST PRSMV CHEM ANLYZR: CPT

## 2019-11-10 PROCEDURE — 2500000003 HC RX 250 WO HCPCS: Performed by: NURSE PRACTITIONER

## 2019-11-10 PROCEDURE — 83690 ASSAY OF LIPASE: CPT

## 2019-11-10 PROCEDURE — 99285 EMERGENCY DEPT VISIT HI MDM: CPT

## 2019-11-10 PROCEDURE — 80076 HEPATIC FUNCTION PANEL: CPT

## 2019-11-10 PROCEDURE — 80048 BASIC METABOLIC PNL TOTAL CA: CPT

## 2019-11-10 PROCEDURE — 96374 THER/PROPH/DIAG INJ IV PUSH: CPT

## 2019-11-10 PROCEDURE — 6370000000 HC RX 637 (ALT 250 FOR IP): Performed by: NURSE PRACTITIONER

## 2019-11-10 PROCEDURE — G0480 DRUG TEST DEF 1-7 CLASSES: HCPCS

## 2019-11-10 PROCEDURE — 2580000003 HC RX 258: Performed by: NURSE PRACTITIONER

## 2019-11-10 RX ORDER — LORAZEPAM 0.5 MG/1
1 TABLET ORAL ONCE
Status: COMPLETED | OUTPATIENT
Start: 2019-11-10 | End: 2019-11-10

## 2019-11-10 RX ORDER — 0.9 % SODIUM CHLORIDE 0.9 %
1000 INTRAVENOUS SOLUTION INTRAVENOUS ONCE
Status: COMPLETED | OUTPATIENT
Start: 2019-11-10 | End: 2019-11-10

## 2019-11-10 RX ADMIN — FAMOTIDINE 20 MG: 10 INJECTION, SOLUTION INTRAVENOUS at 13:01

## 2019-11-10 RX ADMIN — LORAZEPAM 1 MG: 0.5 TABLET ORAL at 13:31

## 2019-11-10 RX ADMIN — SODIUM CHLORIDE 1000 ML: 9 INJECTION, SOLUTION INTRAVENOUS at 13:01

## 2019-11-10 ASSESSMENT — ENCOUNTER SYMPTOMS
BLOOD IN STOOL: 0
PHOTOPHOBIA: 0
NAUSEA: 1
DIARRHEA: 0
VOMITING: 0
VOICE CHANGE: 0
SHORTNESS OF BREATH: 0
COUGH: 0
BACK PAIN: 0
ABDOMINAL DISTENTION: 0
CONSTIPATION: 0
CHEST TIGHTNESS: 0
ABDOMINAL PAIN: 1
SORE THROAT: 0
TROUBLE SWALLOWING: 0
FACIAL SWELLING: 0
EYE PAIN: 0

## 2019-12-04 ENCOUNTER — HOSPITAL ENCOUNTER (INPATIENT)
Age: 49
LOS: 2 days | Discharge: HOME | DRG: 253 | End: 2019-12-06
Attending: EMERGENCY MEDICINE | Admitting: FAMILY MEDICINE
Payer: COMMERCIAL

## 2019-12-04 ENCOUNTER — APPOINTMENT (OUTPATIENT)
Dept: CT IMAGING | Age: 49
DRG: 253 | End: 2019-12-04
Payer: COMMERCIAL

## 2019-12-04 DIAGNOSIS — R45.851 VERBALIZES SUICIDAL THOUGHTS: ICD-10-CM

## 2019-12-04 DIAGNOSIS — K92.0 HEMATEMESIS, PRESENCE OF NAUSEA NOT SPECIFIED: Primary | ICD-10-CM

## 2019-12-04 PROBLEM — K92.2 GI BLEED: Status: ACTIVE | Noted: 2019-12-04

## 2019-12-04 LAB
ABSOLUTE EOS #: 0.1 K/UL (ref 0–0.4)
ABSOLUTE IMMATURE GRANULOCYTE: ABNORMAL K/UL (ref 0–0.3)
ABSOLUTE LYMPH #: 1 K/UL (ref 1–4.8)
ABSOLUTE MONO #: 0.6 K/UL (ref 0.1–1.2)
ACETAMINOPHEN LEVEL: <5 UG/ML (ref 10–30)
ALBUMIN SERPL-MCNC: 4.6 G/DL (ref 3.5–5.2)
ALBUMIN/GLOBULIN RATIO: 1.8 (ref 1–2.5)
ALP BLD-CCNC: 58 U/L (ref 40–129)
ALT SERPL-CCNC: 12 U/L (ref 5–41)
AMYLASE: 62 U/L (ref 28–100)
ANION GAP SERPL CALCULATED.3IONS-SCNC: 15 MMOL/L (ref 9–17)
AST SERPL-CCNC: 16 U/L
BASOPHILS # BLD: 1 % (ref 0–2)
BASOPHILS ABSOLUTE: 0.1 K/UL (ref 0–0.2)
BILIRUB SERPL-MCNC: 0.35 MG/DL (ref 0.3–1.2)
BUN BLDV-MCNC: 12 MG/DL (ref 6–20)
BUN/CREAT BLD: 17 (ref 9–20)
CALCIUM SERPL-MCNC: 9.1 MG/DL (ref 8.6–10.4)
CHLORIDE BLD-SCNC: 104 MMOL/L (ref 98–107)
CO2: 22 MMOL/L (ref 20–31)
CREAT SERPL-MCNC: 0.69 MG/DL (ref 0.7–1.2)
DIFFERENTIAL TYPE: ABNORMAL
EOSINOPHILS RELATIVE PERCENT: 2 % (ref 1–8)
ETHANOL PERCENT: ABNORMAL %
ETHANOL: 99 MG/DL
GFR AFRICAN AMERICAN: >60 ML/MIN
GFR NON-AFRICAN AMERICAN: >60 ML/MIN
GFR SERPL CREATININE-BSD FRML MDRD: ABNORMAL ML/MIN/{1.73_M2}
GFR SERPL CREATININE-BSD FRML MDRD: ABNORMAL ML/MIN/{1.73_M2}
GLUCOSE BLD-MCNC: 79 MG/DL (ref 70–99)
HCT VFR BLD CALC: 42.7 % (ref 41–53)
HEMOGLOBIN: 14.5 G/DL (ref 13.5–17.5)
IMMATURE GRANULOCYTES: ABNORMAL %
INR BLD: 1
LACTIC ACID: 2.2 MMOL/L (ref 0.5–2.2)
LIPASE: 24 U/L (ref 13–60)
LYMPHOCYTES # BLD: 12 % (ref 15–43)
MCH RBC QN AUTO: 32.7 PG (ref 26–34)
MCHC RBC AUTO-ENTMCNC: 33.9 G/DL (ref 31–37)
MCV RBC AUTO: 96.4 FL (ref 80–100)
MONOCYTES # BLD: 7 % (ref 6–14)
NRBC AUTOMATED: ABNORMAL PER 100 WBC
PDW BLD-RTO: 13.7 % (ref 11–14.5)
PLATELET # BLD: 279 K/UL (ref 140–450)
PLATELET ESTIMATE: ABNORMAL
PMV BLD AUTO: 8 FL (ref 6–12)
POTASSIUM SERPL-SCNC: 3.8 MMOL/L (ref 3.7–5.3)
PROTHROMBIN TIME: 10 SEC (ref 9.4–11.3)
RBC # BLD: 4.43 M/UL (ref 4.5–5.9)
RBC # BLD: ABNORMAL 10*6/UL
SALICYLATE LEVEL: <1 MG/DL (ref 3–10)
SEG NEUTROPHILS: 78 % (ref 44–74)
SEGMENTED NEUTROPHILS ABSOLUTE COUNT: 6.4 K/UL (ref 1.8–7.7)
SODIUM BLD-SCNC: 141 MMOL/L (ref 135–144)
TOTAL PROTEIN: 7.2 G/DL (ref 6.4–8.3)
TOXIC TRICYCLIC SC,BLOOD: ABNORMAL
WBC # BLD: 8.2 K/UL (ref 3.5–11)
WBC # BLD: ABNORMAL 10*3/UL

## 2019-12-04 PROCEDURE — 85610 PROTHROMBIN TIME: CPT

## 2019-12-04 PROCEDURE — 82150 ASSAY OF AMYLASE: CPT

## 2019-12-04 PROCEDURE — 80307 DRUG TEST PRSMV CHEM ANLYZR: CPT

## 2019-12-04 PROCEDURE — 2580000003 HC RX 258: Performed by: NURSE PRACTITIONER

## 2019-12-04 PROCEDURE — 83605 ASSAY OF LACTIC ACID: CPT

## 2019-12-04 PROCEDURE — C9113 INJ PANTOPRAZOLE SODIUM, VIA: HCPCS | Performed by: EMERGENCY MEDICINE

## 2019-12-04 PROCEDURE — 36415 COLL VENOUS BLD VENIPUNCTURE: CPT

## 2019-12-04 PROCEDURE — 6360000002 HC RX W HCPCS: Performed by: EMERGENCY MEDICINE

## 2019-12-04 PROCEDURE — 6360000002 HC RX W HCPCS: Performed by: NURSE PRACTITIONER

## 2019-12-04 PROCEDURE — 2060000000 HC ICU INTERMEDIATE R&B

## 2019-12-04 PROCEDURE — 99285 EMERGENCY DEPT VISIT HI MDM: CPT

## 2019-12-04 PROCEDURE — G0480 DRUG TEST DEF 1-7 CLASSES: HCPCS

## 2019-12-04 PROCEDURE — 6360000004 HC RX CONTRAST MEDICATION: Performed by: EMERGENCY MEDICINE

## 2019-12-04 PROCEDURE — 6370000000 HC RX 637 (ALT 250 FOR IP): Performed by: NURSE PRACTITIONER

## 2019-12-04 PROCEDURE — 85018 HEMOGLOBIN: CPT

## 2019-12-04 PROCEDURE — 2580000003 HC RX 258: Performed by: EMERGENCY MEDICINE

## 2019-12-04 PROCEDURE — 74177 CT ABD & PELVIS W/CONTRAST: CPT

## 2019-12-04 PROCEDURE — 85025 COMPLETE CBC W/AUTO DIFF WBC: CPT

## 2019-12-04 PROCEDURE — 80053 COMPREHEN METABOLIC PANEL: CPT

## 2019-12-04 PROCEDURE — 96375 TX/PRO/DX INJ NEW DRUG ADDON: CPT

## 2019-12-04 PROCEDURE — 96374 THER/PROPH/DIAG INJ IV PUSH: CPT

## 2019-12-04 PROCEDURE — 83690 ASSAY OF LIPASE: CPT

## 2019-12-04 PROCEDURE — C9113 INJ PANTOPRAZOLE SODIUM, VIA: HCPCS | Performed by: NURSE PRACTITIONER

## 2019-12-04 RX ORDER — MIDODRINE HYDROCHLORIDE 5 MG/1
5 TABLET ORAL
Status: DISCONTINUED | OUTPATIENT
Start: 2019-12-05 | End: 2019-12-06 | Stop reason: HOSPADM

## 2019-12-04 RX ORDER — HYDROCODONE BITARTRATE AND ACETAMINOPHEN 5; 325 MG/1; MG/1
1 TABLET ORAL EVERY 4 HOURS PRN
Status: DISCONTINUED | OUTPATIENT
Start: 2019-12-04 | End: 2019-12-06 | Stop reason: HOSPADM

## 2019-12-04 RX ORDER — LORAZEPAM 2 MG/ML
2 INJECTION INTRAMUSCULAR
Status: DISCONTINUED | OUTPATIENT
Start: 2019-12-04 | End: 2019-12-06 | Stop reason: HOSPADM

## 2019-12-04 RX ORDER — TRAZODONE HYDROCHLORIDE 50 MG/1
50 TABLET ORAL NIGHTLY PRN
Status: DISCONTINUED | OUTPATIENT
Start: 2019-12-04 | End: 2019-12-06 | Stop reason: HOSPADM

## 2019-12-04 RX ORDER — LORAZEPAM 1 MG/1
1 TABLET ORAL
Status: DISCONTINUED | OUTPATIENT
Start: 2019-12-04 | End: 2019-12-06 | Stop reason: HOSPADM

## 2019-12-04 RX ORDER — SODIUM CHLORIDE 0.9 % (FLUSH) 0.9 %
10 SYRINGE (ML) INJECTION EVERY 12 HOURS SCHEDULED
Status: DISCONTINUED | OUTPATIENT
Start: 2019-12-04 | End: 2019-12-06 | Stop reason: HOSPADM

## 2019-12-04 RX ORDER — THIAMINE MONONITRATE (VIT B1) 100 MG
100 TABLET ORAL DAILY
Status: DISCONTINUED | OUTPATIENT
Start: 2019-12-05 | End: 2019-12-06 | Stop reason: HOSPADM

## 2019-12-04 RX ORDER — FLUTICASONE PROPIONATE 50 MCG
2 SPRAY, SUSPENSION (ML) NASAL DAILY
Status: DISCONTINUED | OUTPATIENT
Start: 2019-12-05 | End: 2019-12-06 | Stop reason: HOSPADM

## 2019-12-04 RX ORDER — BUPROPION HYDROCHLORIDE 150 MG/1
150 TABLET ORAL EVERY MORNING
Status: DISCONTINUED | OUTPATIENT
Start: 2019-12-05 | End: 2019-12-06 | Stop reason: HOSPADM

## 2019-12-04 RX ORDER — FOLIC ACID 1 MG/1
1 TABLET ORAL DAILY
Status: DISCONTINUED | OUTPATIENT
Start: 2019-12-05 | End: 2019-12-06 | Stop reason: HOSPADM

## 2019-12-04 RX ORDER — LORAZEPAM 1 MG/1
3 TABLET ORAL
Status: DISCONTINUED | OUTPATIENT
Start: 2019-12-04 | End: 2019-12-06 | Stop reason: HOSPADM

## 2019-12-04 RX ORDER — SODIUM CHLORIDE 0.9 % (FLUSH) 0.9 %
10 SYRINGE (ML) INJECTION PRN
Status: DISCONTINUED | OUTPATIENT
Start: 2019-12-04 | End: 2019-12-06 | Stop reason: HOSPADM

## 2019-12-04 RX ORDER — LORAZEPAM 2 MG/ML
3 INJECTION INTRAMUSCULAR
Status: DISCONTINUED | OUTPATIENT
Start: 2019-12-04 | End: 2019-12-06 | Stop reason: HOSPADM

## 2019-12-04 RX ORDER — LORAZEPAM 1 MG/1
2 TABLET ORAL
Status: DISCONTINUED | OUTPATIENT
Start: 2019-12-04 | End: 2019-12-06 | Stop reason: HOSPADM

## 2019-12-04 RX ORDER — QUETIAPINE FUMARATE 100 MG/1
200 TABLET, FILM COATED ORAL 2 TIMES DAILY
Status: DISCONTINUED | OUTPATIENT
Start: 2019-12-04 | End: 2019-12-06 | Stop reason: HOSPADM

## 2019-12-04 RX ORDER — SODIUM CHLORIDE 9 MG/ML
10 INJECTION INTRAVENOUS ONCE
Status: COMPLETED | OUTPATIENT
Start: 2019-12-04 | End: 2019-12-04

## 2019-12-04 RX ORDER — SODIUM CHLORIDE 9 MG/ML
INJECTION, SOLUTION INTRAVENOUS CONTINUOUS
Status: DISCONTINUED | OUTPATIENT
Start: 2019-12-04 | End: 2019-12-06 | Stop reason: HOSPADM

## 2019-12-04 RX ORDER — HYDROCODONE BITARTRATE AND ACETAMINOPHEN 5; 325 MG/1; MG/1
2 TABLET ORAL EVERY 4 HOURS PRN
Status: DISCONTINUED | OUTPATIENT
Start: 2019-12-04 | End: 2019-12-06 | Stop reason: HOSPADM

## 2019-12-04 RX ORDER — LORAZEPAM 2 MG/ML
4 INJECTION INTRAMUSCULAR
Status: DISCONTINUED | OUTPATIENT
Start: 2019-12-04 | End: 2019-12-06 | Stop reason: HOSPADM

## 2019-12-04 RX ORDER — MORPHINE SULFATE 4 MG/ML
4 INJECTION, SOLUTION INTRAMUSCULAR; INTRAVENOUS
Status: DISCONTINUED | OUTPATIENT
Start: 2019-12-04 | End: 2019-12-06 | Stop reason: HOSPADM

## 2019-12-04 RX ORDER — LORAZEPAM 2 MG/ML
1 INJECTION INTRAMUSCULAR
Status: DISCONTINUED | OUTPATIENT
Start: 2019-12-04 | End: 2019-12-06 | Stop reason: HOSPADM

## 2019-12-04 RX ORDER — ONDANSETRON 2 MG/ML
4 INJECTION INTRAMUSCULAR; INTRAVENOUS ONCE
Status: COMPLETED | OUTPATIENT
Start: 2019-12-04 | End: 2019-12-04

## 2019-12-04 RX ORDER — PANTOPRAZOLE SODIUM 40 MG/10ML
40 INJECTION, POWDER, LYOPHILIZED, FOR SOLUTION INTRAVENOUS EVERY 12 HOURS
Status: DISCONTINUED | OUTPATIENT
Start: 2019-12-04 | End: 2019-12-06 | Stop reason: HOSPADM

## 2019-12-04 RX ORDER — LORAZEPAM 2 MG/ML
1 INJECTION INTRAMUSCULAR ONCE
Status: COMPLETED | OUTPATIENT
Start: 2019-12-04 | End: 2019-12-04

## 2019-12-04 RX ORDER — MORPHINE SULFATE 2 MG/ML
2 INJECTION, SOLUTION INTRAMUSCULAR; INTRAVENOUS
Status: DISCONTINUED | OUTPATIENT
Start: 2019-12-04 | End: 2019-12-06 | Stop reason: HOSPADM

## 2019-12-04 RX ORDER — ACETAMINOPHEN 325 MG/1
650 TABLET ORAL EVERY 4 HOURS PRN
Status: DISCONTINUED | OUTPATIENT
Start: 2019-12-04 | End: 2019-12-06 | Stop reason: HOSPADM

## 2019-12-04 RX ORDER — 0.9 % SODIUM CHLORIDE 0.9 %
1000 INTRAVENOUS SOLUTION INTRAVENOUS ONCE
Status: COMPLETED | OUTPATIENT
Start: 2019-12-04 | End: 2019-12-04

## 2019-12-04 RX ORDER — MULTIVITAMIN WITH FOLIC ACID 400 MCG
1 TABLET ORAL DAILY
Status: DISCONTINUED | OUTPATIENT
Start: 2019-12-05 | End: 2019-12-06 | Stop reason: HOSPADM

## 2019-12-04 RX ORDER — SODIUM CHLORIDE 9 MG/ML
10 INJECTION INTRAVENOUS EVERY 12 HOURS
Status: DISCONTINUED | OUTPATIENT
Start: 2019-12-04 | End: 2019-12-06 | Stop reason: HOSPADM

## 2019-12-04 RX ORDER — PANTOPRAZOLE SODIUM 40 MG/10ML
40 INJECTION, POWDER, LYOPHILIZED, FOR SOLUTION INTRAVENOUS ONCE
Status: COMPLETED | OUTPATIENT
Start: 2019-12-04 | End: 2019-12-04

## 2019-12-04 RX ORDER — LORAZEPAM 1 MG/1
4 TABLET ORAL
Status: DISCONTINUED | OUTPATIENT
Start: 2019-12-04 | End: 2019-12-06 | Stop reason: HOSPADM

## 2019-12-04 RX ORDER — ONDANSETRON 2 MG/ML
4 INJECTION INTRAMUSCULAR; INTRAVENOUS EVERY 6 HOURS PRN
Status: DISCONTINUED | OUTPATIENT
Start: 2019-12-04 | End: 2019-12-06 | Stop reason: HOSPADM

## 2019-12-04 RX ADMIN — LORAZEPAM 1 MG: 2 INJECTION, SOLUTION INTRAMUSCULAR; INTRAVENOUS at 19:14

## 2019-12-04 RX ADMIN — SODIUM CHLORIDE 1000 ML: 9 INJECTION, SOLUTION INTRAVENOUS at 18:41

## 2019-12-04 RX ADMIN — IOPAMIDOL 100 ML: 755 INJECTION, SOLUTION INTRAVENOUS at 19:38

## 2019-12-04 RX ADMIN — ONDANSETRON 4 MG: 2 INJECTION INTRAMUSCULAR; INTRAVENOUS at 18:41

## 2019-12-04 RX ADMIN — SODIUM CHLORIDE: 9 INJECTION, SOLUTION INTRAVENOUS at 22:10

## 2019-12-04 RX ADMIN — QUETIAPINE FUMARATE 200 MG: 100 TABLET ORAL at 22:09

## 2019-12-04 RX ADMIN — ACETAMINOPHEN 650 MG: 325 TABLET ORAL at 22:09

## 2019-12-04 RX ADMIN — TRAZODONE HYDROCHLORIDE 50 MG: 50 TABLET ORAL at 22:09

## 2019-12-04 RX ADMIN — PANTOPRAZOLE SODIUM 40 MG: 40 INJECTION, POWDER, FOR SOLUTION INTRAVENOUS at 22:09

## 2019-12-04 RX ADMIN — PANTOPRAZOLE SODIUM 40 MG: 40 INJECTION, POWDER, FOR SOLUTION INTRAVENOUS at 18:42

## 2019-12-04 RX ADMIN — Medication 10 ML: at 18:45

## 2019-12-04 RX ADMIN — Medication 10 ML: at 22:11

## 2019-12-04 ASSESSMENT — PAIN SCALES - GENERAL
PAINLEVEL_OUTOF10: 3
PAINLEVEL_OUTOF10: 0
PAINLEVEL_OUTOF10: 3
PAINLEVEL_OUTOF10: 0

## 2019-12-04 ASSESSMENT — ENCOUNTER SYMPTOMS
BLOOD IN STOOL: 0
SORE THROAT: 0
DIARRHEA: 0
TROUBLE SWALLOWING: 0
CONSTIPATION: 0
WHEEZING: 0
NAUSEA: 1
BACK PAIN: 0
ABDOMINAL PAIN: 1
VOMITING: 1
SHORTNESS OF BREATH: 0

## 2019-12-04 ASSESSMENT — PAIN DESCRIPTION - DESCRIPTORS: DESCRIPTORS: BURNING

## 2019-12-04 ASSESSMENT — PAIN DESCRIPTION - ORIENTATION: ORIENTATION: UPPER

## 2019-12-04 ASSESSMENT — PAIN DESCRIPTION - PAIN TYPE: TYPE: ACUTE PAIN

## 2019-12-04 ASSESSMENT — PAIN DESCRIPTION - LOCATION: LOCATION: ABDOMEN

## 2019-12-05 ENCOUNTER — ANESTHESIA EVENT (OUTPATIENT)
Dept: OPERATING ROOM | Age: 49
DRG: 253 | End: 2019-12-05
Payer: COMMERCIAL

## 2019-12-05 ENCOUNTER — ANESTHESIA (OUTPATIENT)
Dept: OPERATING ROOM | Age: 49
DRG: 253 | End: 2019-12-05
Payer: COMMERCIAL

## 2019-12-05 VITALS — DIASTOLIC BLOOD PRESSURE: 51 MMHG | TEMPERATURE: 97.7 F | OXYGEN SATURATION: 97 % | SYSTOLIC BLOOD PRESSURE: 92 MMHG

## 2019-12-05 PROBLEM — E87.29 METABOLIC ACIDOSIS, INCREASED ANION GAP: Status: RESOLVED | Noted: 2019-05-29 | Resolved: 2019-12-05

## 2019-12-05 LAB
ANION GAP SERPL CALCULATED.3IONS-SCNC: 18 MMOL/L (ref 9–17)
BUN BLDV-MCNC: 20 MG/DL (ref 6–20)
BUN/CREAT BLD: 29 (ref 9–20)
CALCIUM SERPL-MCNC: 8.1 MG/DL (ref 8.6–10.4)
CHLORIDE BLD-SCNC: 105 MMOL/L (ref 98–107)
CO2: 16 MMOL/L (ref 20–31)
CREAT SERPL-MCNC: 0.7 MG/DL (ref 0.7–1.2)
ETHANOL PERCENT: NORMAL %
ETHANOL: <10 MG/DL
GFR AFRICAN AMERICAN: >60 ML/MIN
GFR NON-AFRICAN AMERICAN: >60 ML/MIN
GFR SERPL CREATININE-BSD FRML MDRD: ABNORMAL ML/MIN/{1.73_M2}
GFR SERPL CREATININE-BSD FRML MDRD: ABNORMAL ML/MIN/{1.73_M2}
GLUCOSE BLD-MCNC: 64 MG/DL (ref 70–99)
HCT VFR BLD CALC: 35.9 % (ref 41–53)
HEMOGLOBIN: 11.6 G/DL (ref 13.5–17.5)
HEMOGLOBIN: 12 G/DL (ref 13.5–17.5)
HEMOGLOBIN: 12 G/DL (ref 13.5–17.5)
HEMOGLOBIN: 12.6 G/DL (ref 13.5–17.5)
INR BLD: 1
MCH RBC QN AUTO: 32.4 PG (ref 26–34)
MCHC RBC AUTO-ENTMCNC: 33.6 G/DL (ref 31–37)
MCV RBC AUTO: 96.6 FL (ref 80–100)
NRBC AUTOMATED: ABNORMAL PER 100 WBC
PDW BLD-RTO: 13.4 % (ref 11–14.5)
PLATELET # BLD: 228 K/UL (ref 140–450)
PMV BLD AUTO: 8.5 FL (ref 6–12)
POTASSIUM SERPL-SCNC: 3.9 MMOL/L (ref 3.7–5.3)
PROTHROMBIN TIME: 10.2 SEC (ref 9.4–11.3)
RBC # BLD: 3.72 M/UL (ref 4.5–5.9)
SODIUM BLD-SCNC: 139 MMOL/L (ref 135–144)
WBC # BLD: 5.9 K/UL (ref 3.5–11)

## 2019-12-05 PROCEDURE — 6360000002 HC RX W HCPCS: Performed by: NURSE PRACTITIONER

## 2019-12-05 PROCEDURE — 85027 COMPLETE CBC AUTOMATED: CPT

## 2019-12-05 PROCEDURE — 3609017100 HC EGD: Performed by: SURGERY

## 2019-12-05 PROCEDURE — C9113 INJ PANTOPRAZOLE SODIUM, VIA: HCPCS | Performed by: NURSE PRACTITIONER

## 2019-12-05 PROCEDURE — C9113 INJ PANTOPRAZOLE SODIUM, VIA: HCPCS | Performed by: SURGERY

## 2019-12-05 PROCEDURE — 2580000003 HC RX 258: Performed by: NURSE PRACTITIONER

## 2019-12-05 PROCEDURE — 6370000000 HC RX 637 (ALT 250 FOR IP): Performed by: NURSE PRACTITIONER

## 2019-12-05 PROCEDURE — 36415 COLL VENOUS BLD VENIPUNCTURE: CPT

## 2019-12-05 PROCEDURE — 0DJ08ZZ INSPECTION OF UPPER INTESTINAL TRACT, VIA NATURAL OR ARTIFICIAL OPENING ENDOSCOPIC: ICD-10-PCS | Performed by: SURGERY

## 2019-12-05 PROCEDURE — 94761 N-INVAS EAR/PLS OXIMETRY MLT: CPT

## 2019-12-05 PROCEDURE — 94760 N-INVAS EAR/PLS OXIMETRY 1: CPT

## 2019-12-05 PROCEDURE — 7100000010 HC PHASE II RECOVERY - FIRST 15 MIN: Performed by: SURGERY

## 2019-12-05 PROCEDURE — 2709999900 HC NON-CHARGEABLE SUPPLY: Performed by: SURGERY

## 2019-12-05 PROCEDURE — 2580000003 HC RX 258: Performed by: SURGERY

## 2019-12-05 PROCEDURE — 2060000000 HC ICU INTERMEDIATE R&B

## 2019-12-05 PROCEDURE — 6360000002 HC RX W HCPCS: Performed by: NURSE ANESTHETIST, CERTIFIED REGISTERED

## 2019-12-05 PROCEDURE — 99238 HOSP IP/OBS DSCHRG MGMT 30/<: CPT | Performed by: INTERNAL MEDICINE

## 2019-12-05 PROCEDURE — 99252 IP/OBS CONSLTJ NEW/EST SF 35: CPT | Performed by: SURGERY

## 2019-12-05 PROCEDURE — 6360000002 HC RX W HCPCS: Performed by: SURGERY

## 2019-12-05 PROCEDURE — G0480 DRUG TEST DEF 1-7 CLASSES: HCPCS

## 2019-12-05 PROCEDURE — 3700000000 HC ANESTHESIA ATTENDED CARE: Performed by: SURGERY

## 2019-12-05 PROCEDURE — 7100000011 HC PHASE II RECOVERY - ADDTL 15 MIN: Performed by: SURGERY

## 2019-12-05 PROCEDURE — 85018 HEMOGLOBIN: CPT

## 2019-12-05 PROCEDURE — 2500000003 HC RX 250 WO HCPCS: Performed by: NURSE ANESTHETIST, CERTIFIED REGISTERED

## 2019-12-05 PROCEDURE — APPSS45 APP SPLIT SHARED TIME 31-45 MINUTES: Performed by: NURSE PRACTITIONER

## 2019-12-05 PROCEDURE — 3700000001 HC ADD 15 MINUTES (ANESTHESIA): Performed by: SURGERY

## 2019-12-05 PROCEDURE — 6370000000 HC RX 637 (ALT 250 FOR IP): Performed by: SURGERY

## 2019-12-05 PROCEDURE — 43235 EGD DIAGNOSTIC BRUSH WASH: CPT | Performed by: SURGERY

## 2019-12-05 PROCEDURE — 2580000003 HC RX 258: Performed by: NURSE ANESTHETIST, CERTIFIED REGISTERED

## 2019-12-05 PROCEDURE — 85610 PROTHROMBIN TIME: CPT

## 2019-12-05 PROCEDURE — 80048 BASIC METABOLIC PNL TOTAL CA: CPT

## 2019-12-05 RX ORDER — DEXTROSE MONOHYDRATE 50 MG/ML
100 INJECTION, SOLUTION INTRAVENOUS PRN
Status: DISCONTINUED | OUTPATIENT
Start: 2019-12-05 | End: 2019-12-06 | Stop reason: HOSPADM

## 2019-12-05 RX ORDER — GLYCOPYRROLATE 0.2 MG/ML
INJECTION INTRAMUSCULAR; INTRAVENOUS PRN
Status: DISCONTINUED | OUTPATIENT
Start: 2019-12-05 | End: 2019-12-05 | Stop reason: SDUPTHER

## 2019-12-05 RX ORDER — SODIUM CHLORIDE, SODIUM LACTATE, POTASSIUM CHLORIDE, CALCIUM CHLORIDE 600; 310; 30; 20 MG/100ML; MG/100ML; MG/100ML; MG/100ML
INJECTION, SOLUTION INTRAVENOUS CONTINUOUS PRN
Status: DISCONTINUED | OUTPATIENT
Start: 2019-12-05 | End: 2019-12-05 | Stop reason: SDUPTHER

## 2019-12-05 RX ORDER — PROPOFOL 10 MG/ML
INJECTION, EMULSION INTRAVENOUS PRN
Status: DISCONTINUED | OUTPATIENT
Start: 2019-12-05 | End: 2019-12-05 | Stop reason: SDUPTHER

## 2019-12-05 RX ORDER — DEXTROSE MONOHYDRATE 25 G/50ML
12.5 INJECTION, SOLUTION INTRAVENOUS PRN
Status: DISCONTINUED | OUTPATIENT
Start: 2019-12-05 | End: 2019-12-06 | Stop reason: HOSPADM

## 2019-12-05 RX ORDER — LIDOCAINE HYDROCHLORIDE 40 MG/ML
INJECTION, SOLUTION RETROBULBAR; TOPICAL PRN
Status: DISCONTINUED | OUTPATIENT
Start: 2019-12-05 | End: 2019-12-05 | Stop reason: SDUPTHER

## 2019-12-05 RX ORDER — NICOTINE POLACRILEX 4 MG
15 LOZENGE BUCCAL PRN
Status: DISCONTINUED | OUTPATIENT
Start: 2019-12-05 | End: 2019-12-06 | Stop reason: HOSPADM

## 2019-12-05 RX ORDER — SODIUM CHLORIDE, SODIUM LACTATE, POTASSIUM CHLORIDE, CALCIUM CHLORIDE 600; 310; 30; 20 MG/100ML; MG/100ML; MG/100ML; MG/100ML
INJECTION, SOLUTION INTRAVENOUS CONTINUOUS
Status: DISCONTINUED | OUTPATIENT
Start: 2019-12-05 | End: 2019-12-05

## 2019-12-05 RX ORDER — KETAMINE HCL IN NACL, ISO-OSM 100MG/10ML
SYRINGE (ML) INJECTION PRN
Status: DISCONTINUED | OUTPATIENT
Start: 2019-12-05 | End: 2019-12-05 | Stop reason: SDUPTHER

## 2019-12-05 RX ADMIN — PANTOPRAZOLE SODIUM 40 MG: 40 INJECTION, POWDER, FOR SOLUTION INTRAVENOUS at 10:40

## 2019-12-05 RX ADMIN — Medication 10 ML: at 23:03

## 2019-12-05 RX ADMIN — GLYCOPYRROLATE 0.2 MG: 0.2 INJECTION INTRAMUSCULAR; INTRAVENOUS at 14:59

## 2019-12-05 RX ADMIN — Medication 20 MG: at 15:03

## 2019-12-05 RX ADMIN — PANTOPRAZOLE SODIUM 40 MG: 40 INJECTION, POWDER, FOR SOLUTION INTRAVENOUS at 23:01

## 2019-12-05 RX ADMIN — SODIUM CHLORIDE: 9 INJECTION, SOLUTION INTRAVENOUS at 04:13

## 2019-12-05 RX ADMIN — SODIUM CHLORIDE, SODIUM LACTATE, POTASSIUM CHLORIDE, AND CALCIUM CHLORIDE: .6; .31; .03; .02 INJECTION, SOLUTION INTRAVENOUS at 14:45

## 2019-12-05 RX ADMIN — THERA TABS 1 TABLET: TAB at 10:40

## 2019-12-05 RX ADMIN — SODIUM CHLORIDE: 9 INJECTION, SOLUTION INTRAVENOUS at 10:56

## 2019-12-05 RX ADMIN — SODIUM CHLORIDE, POTASSIUM CHLORIDE, SODIUM LACTATE AND CALCIUM CHLORIDE: 600; 310; 30; 20 INJECTION, SOLUTION INTRAVENOUS at 14:54

## 2019-12-05 RX ADMIN — LORAZEPAM 2 MG: 2 INJECTION, SOLUTION INTRAMUSCULAR; INTRAVENOUS at 00:28

## 2019-12-05 RX ADMIN — QUETIAPINE FUMARATE 200 MG: 100 TABLET ORAL at 23:00

## 2019-12-05 RX ADMIN — Medication 10 ML: at 23:01

## 2019-12-05 RX ADMIN — LIDOCAINE HYDROCHLORIDE 100 MG: 40 INJECTION, SOLUTION RETROBULBAR; TOPICAL at 15:03

## 2019-12-05 RX ADMIN — BUPROPION HYDROCHLORIDE 150 MG: 150 TABLET, EXTENDED RELEASE ORAL at 10:40

## 2019-12-05 RX ADMIN — FOLIC ACID 1 MG: 1 TABLET ORAL at 10:40

## 2019-12-05 RX ADMIN — Medication 100 MG: at 10:40

## 2019-12-05 RX ADMIN — FLUTICASONE PROPIONATE 2 SPRAY: 50 SPRAY, METERED NASAL at 10:40

## 2019-12-05 RX ADMIN — PHENYLEPHRINE HYDROCHLORIDE 300 MCG: 10 INJECTION INTRAVENOUS at 15:15

## 2019-12-05 RX ADMIN — QUETIAPINE FUMARATE 200 MG: 100 TABLET ORAL at 10:42

## 2019-12-05 RX ADMIN — PROPOFOL 400 MG: 10 INJECTION, EMULSION INTRAVENOUS at 15:03

## 2019-12-05 ASSESSMENT — ENCOUNTER SYMPTOMS
WHEEZING: 0
TROUBLE SWALLOWING: 0
CONSTIPATION: 0
CHEST TIGHTNESS: 0
VOMITING: 1
COUGH: 0
BLOOD IN STOOL: 0
DIARRHEA: 0
CHOKING: 0
ABDOMINAL PAIN: 0

## 2019-12-05 ASSESSMENT — PAIN SCALES - GENERAL
PAINLEVEL_OUTOF10: 0

## 2019-12-06 VITALS
WEIGHT: 140.5 LBS | RESPIRATION RATE: 16 BRPM | SYSTOLIC BLOOD PRESSURE: 94 MMHG | HEIGHT: 69 IN | BODY MASS INDEX: 20.81 KG/M2 | OXYGEN SATURATION: 95 % | TEMPERATURE: 97.6 F | HEART RATE: 71 BPM | DIASTOLIC BLOOD PRESSURE: 58 MMHG

## 2019-12-06 LAB
HEMOGLOBIN: 11.5 G/DL (ref 13.5–17.5)
HEMOGLOBIN: 11.7 G/DL (ref 13.5–17.5)

## 2019-12-06 PROCEDURE — 85018 HEMOGLOBIN: CPT

## 2019-12-06 PROCEDURE — 36415 COLL VENOUS BLD VENIPUNCTURE: CPT

## 2019-12-10 ENCOUNTER — HOSPITAL ENCOUNTER (EMERGENCY)
Age: 49
Discharge: ANOTHER HOSPITAL | End: 2019-12-11
Attending: EMERGENCY MEDICINE
Payer: COMMERCIAL

## 2019-12-10 DIAGNOSIS — F10.920 ACUTE ALCOHOLIC INTOXICATION WITHOUT COMPLICATION (HCC): Primary | ICD-10-CM

## 2019-12-10 LAB
-: ABNORMAL
ABSOLUTE EOS #: 0.1 K/UL (ref 0–0.4)
ABSOLUTE IMMATURE GRANULOCYTE: ABNORMAL K/UL (ref 0–0.3)
ABSOLUTE LYMPH #: 1.2 K/UL (ref 1–4.8)
ABSOLUTE MONO #: 0.7 K/UL (ref 0.1–1.2)
ALBUMIN SERPL-MCNC: 4.5 G/DL (ref 3.5–5.2)
ALBUMIN/GLOBULIN RATIO: 1.9 (ref 1–2.5)
ALP BLD-CCNC: 73 U/L (ref 40–129)
ALT SERPL-CCNC: 30 U/L (ref 5–41)
AMMONIA: 53 UMOL/L (ref 16–60)
AMORPHOUS: ABNORMAL
AMPHETAMINE SCREEN URINE: NEGATIVE
ANION GAP SERPL CALCULATED.3IONS-SCNC: 16 MMOL/L (ref 9–17)
AST SERPL-CCNC: 31 U/L
BACTERIA: ABNORMAL
BARBITURATE SCREEN URINE: NEGATIVE
BASOPHILS # BLD: 1 % (ref 0–2)
BASOPHILS ABSOLUTE: 0.1 K/UL (ref 0–0.2)
BENZODIAZEPINE SCREEN, URINE: NEGATIVE
BILIRUB SERPL-MCNC: 0.46 MG/DL (ref 0.3–1.2)
BILIRUBIN DIRECT: 0.14 MG/DL
BILIRUBIN URINE: NEGATIVE
BILIRUBIN, INDIRECT: 0.32 MG/DL (ref 0–1)
BUN BLDV-MCNC: 8 MG/DL (ref 6–20)
BUN/CREAT BLD: 15 (ref 9–20)
BUPRENORPHINE URINE: NEGATIVE
CALCIUM SERPL-MCNC: 9.1 MG/DL (ref 8.6–10.4)
CANNABINOID SCREEN URINE: NEGATIVE
CASTS UA: ABNORMAL /LPF (ref 0–2)
CHLORIDE BLD-SCNC: 100 MMOL/L (ref 98–107)
CO2: 22 MMOL/L (ref 20–31)
COCAINE METABOLITE, URINE: NEGATIVE
COLOR: ABNORMAL
COMMENT UA: ABNORMAL
CREAT SERPL-MCNC: 0.53 MG/DL (ref 0.7–1.2)
CRYSTALS, UA: ABNORMAL /HPF
DIFFERENTIAL TYPE: ABNORMAL
EOSINOPHILS RELATIVE PERCENT: 2 % (ref 1–8)
EPITHELIAL CELLS UA: ABNORMAL /HPF (ref 0–5)
ETHANOL PERCENT: ABNORMAL %
ETHANOL: 128 MG/DL
GFR AFRICAN AMERICAN: >60 ML/MIN
GFR NON-AFRICAN AMERICAN: >60 ML/MIN
GFR SERPL CREATININE-BSD FRML MDRD: ABNORMAL ML/MIN/{1.73_M2}
GFR SERPL CREATININE-BSD FRML MDRD: ABNORMAL ML/MIN/{1.73_M2}
GLOBULIN: 2.4 G/DL (ref 1.5–3.8)
GLUCOSE BLD-MCNC: 83 MG/DL (ref 70–99)
GLUCOSE URINE: NEGATIVE
HCT VFR BLD CALC: 40.1 % (ref 41–53)
HEMOGLOBIN: 13.6 G/DL (ref 13.5–17.5)
IMMATURE GRANULOCYTES: ABNORMAL %
INR BLD: 1
KETONES, URINE: ABNORMAL
LEUKOCYTE ESTERASE, URINE: NEGATIVE
LIPASE: 28 U/L (ref 13–60)
LYMPHOCYTES # BLD: 19 % (ref 15–43)
MAGNESIUM: 1.8 MG/DL (ref 1.6–2.6)
MCH RBC QN AUTO: 32.4 PG (ref 26–34)
MCHC RBC AUTO-ENTMCNC: 33.9 G/DL (ref 31–37)
MCV RBC AUTO: 95.4 FL (ref 80–100)
MDMA URINE: NORMAL
METHADONE SCREEN, URINE: NEGATIVE
METHAMPHETAMINE, URINE: NEGATIVE
MONOCYTES # BLD: 11 % (ref 6–14)
MUCUS: ABNORMAL
NITRITE, URINE: NEGATIVE
NRBC AUTOMATED: ABNORMAL PER 100 WBC
OPIATES, URINE: NEGATIVE
OTHER OBSERVATIONS UA: ABNORMAL
OXYCODONE SCREEN URINE: NEGATIVE
PARTIAL THROMBOPLASTIN TIME: 25.5 SEC (ref 27–35)
PDW BLD-RTO: 13.3 % (ref 11–14.5)
PH UA: 6 (ref 5–6)
PHENCYCLIDINE, URINE: NEGATIVE
PLATELET # BLD: 311 K/UL (ref 140–450)
PLATELET ESTIMATE: ABNORMAL
PMV BLD AUTO: 7.9 FL (ref 6–12)
POTASSIUM SERPL-SCNC: 3.1 MMOL/L (ref 3.7–5.3)
PROPOXYPHENE, URINE: NEGATIVE
PROTEIN UA: NEGATIVE
PROTHROMBIN TIME: 10.2 SEC (ref 9.4–11.3)
RBC # BLD: 4.21 M/UL (ref 4.5–5.9)
RBC # BLD: ABNORMAL 10*6/UL
RBC UA: ABNORMAL /HPF (ref 0–4)
RENAL EPITHELIAL, UA: ABNORMAL /HPF
SEG NEUTROPHILS: 67 % (ref 44–74)
SEGMENTED NEUTROPHILS ABSOLUTE COUNT: 4.3 K/UL (ref 1.8–7.7)
SODIUM BLD-SCNC: 138 MMOL/L (ref 135–144)
SPECIFIC GRAVITY UA: 1.02 (ref 1.01–1.02)
TEST INFORMATION: NORMAL
TOTAL PROTEIN: 6.9 G/DL (ref 6.4–8.3)
TRICHOMONAS: ABNORMAL
TRICYCLIC ANTIDEPRESSANTS, UR: NEGATIVE
TURBIDITY: ABNORMAL
URINE HGB: NEGATIVE
UROBILINOGEN, URINE: NORMAL
WBC # BLD: 6.5 K/UL (ref 3.5–11)
WBC # BLD: ABNORMAL 10*3/UL
WBC UA: ABNORMAL /HPF (ref 0–4)
YEAST: ABNORMAL

## 2019-12-10 PROCEDURE — 85610 PROTHROMBIN TIME: CPT

## 2019-12-10 PROCEDURE — 81001 URINALYSIS AUTO W/SCOPE: CPT

## 2019-12-10 PROCEDURE — 80306 DRUG TEST PRSMV INSTRMNT: CPT

## 2019-12-10 PROCEDURE — 82140 ASSAY OF AMMONIA: CPT

## 2019-12-10 PROCEDURE — 80048 BASIC METABOLIC PNL TOTAL CA: CPT

## 2019-12-10 PROCEDURE — 6370000000 HC RX 637 (ALT 250 FOR IP): Performed by: EMERGENCY MEDICINE

## 2019-12-10 PROCEDURE — 80076 HEPATIC FUNCTION PANEL: CPT

## 2019-12-10 PROCEDURE — 83735 ASSAY OF MAGNESIUM: CPT

## 2019-12-10 PROCEDURE — 36415 COLL VENOUS BLD VENIPUNCTURE: CPT

## 2019-12-10 PROCEDURE — 85025 COMPLETE CBC W/AUTO DIFF WBC: CPT

## 2019-12-10 PROCEDURE — 99284 EMERGENCY DEPT VISIT MOD MDM: CPT

## 2019-12-10 PROCEDURE — 83690 ASSAY OF LIPASE: CPT

## 2019-12-10 PROCEDURE — G0480 DRUG TEST DEF 1-7 CLASSES: HCPCS

## 2019-12-10 PROCEDURE — 85730 THROMBOPLASTIN TIME PARTIAL: CPT

## 2019-12-10 RX ORDER — LORAZEPAM 0.5 MG/1
2 TABLET ORAL ONCE
Status: COMPLETED | OUTPATIENT
Start: 2019-12-10 | End: 2019-12-10

## 2019-12-10 RX ADMIN — LORAZEPAM 2 MG: 0.5 TABLET ORAL at 17:51

## 2019-12-11 VITALS
RESPIRATION RATE: 12 BRPM | BODY MASS INDEX: 20.67 KG/M2 | DIASTOLIC BLOOD PRESSURE: 77 MMHG | OXYGEN SATURATION: 97 % | TEMPERATURE: 98.6 F | WEIGHT: 140 LBS | SYSTOLIC BLOOD PRESSURE: 111 MMHG | HEART RATE: 86 BPM

## 2019-12-11 PROCEDURE — 6370000000 HC RX 637 (ALT 250 FOR IP): Performed by: EMERGENCY MEDICINE

## 2019-12-11 RX ORDER — LORAZEPAM 0.5 MG/1
2 TABLET ORAL ONCE
Status: COMPLETED | OUTPATIENT
Start: 2019-12-11 | End: 2019-12-11

## 2019-12-11 RX ADMIN — LORAZEPAM 2 MG: 0.5 TABLET ORAL at 02:07

## 2019-12-17 ENCOUNTER — OFFICE VISIT (OUTPATIENT)
Dept: PRIMARY CARE CLINIC | Age: 49
End: 2019-12-17
Payer: COMMERCIAL

## 2019-12-17 VITALS
BODY MASS INDEX: 22 KG/M2 | HEART RATE: 103 BPM | TEMPERATURE: 96.6 F | WEIGHT: 149 LBS | SYSTOLIC BLOOD PRESSURE: 108 MMHG | DIASTOLIC BLOOD PRESSURE: 70 MMHG | OXYGEN SATURATION: 95 %

## 2019-12-17 DIAGNOSIS — H65.92 LEFT NON-SUPPURATIVE OTITIS MEDIA: Primary | ICD-10-CM

## 2019-12-17 DIAGNOSIS — J06.9 VIRAL URI: ICD-10-CM

## 2019-12-17 PROCEDURE — 1111F DSCHRG MED/CURRENT MED MERGE: CPT | Performed by: NURSE PRACTITIONER

## 2019-12-17 PROCEDURE — G8427 DOCREV CUR MEDS BY ELIG CLIN: HCPCS | Performed by: NURSE PRACTITIONER

## 2019-12-17 PROCEDURE — 3017F COLORECTAL CA SCREEN DOC REV: CPT | Performed by: NURSE PRACTITIONER

## 2019-12-17 PROCEDURE — G8420 CALC BMI NORM PARAMETERS: HCPCS | Performed by: NURSE PRACTITIONER

## 2019-12-17 PROCEDURE — 1036F TOBACCO NON-USER: CPT | Performed by: NURSE PRACTITIONER

## 2019-12-17 PROCEDURE — 99213 OFFICE O/P EST LOW 20 MIN: CPT | Performed by: NURSE PRACTITIONER

## 2019-12-17 PROCEDURE — G8484 FLU IMMUNIZE NO ADMIN: HCPCS | Performed by: NURSE PRACTITIONER

## 2019-12-17 RX ORDER — CEPHALEXIN 500 MG/1
500 CAPSULE ORAL 4 TIMES DAILY
Qty: 40 CAPSULE | Refills: 0 | Status: SHIPPED | OUTPATIENT
Start: 2019-12-17 | End: 2019-12-27

## 2019-12-17 ASSESSMENT — ENCOUNTER SYMPTOMS
ABDOMINAL PAIN: 0
VOMITING: 0
DIARRHEA: 0
COUGH: 0
SORE THROAT: 0
FACIAL SWELLING: 0
RHINORRHEA: 1

## 2019-12-18 ENCOUNTER — HOSPITAL ENCOUNTER (EMERGENCY)
Age: 49
Discharge: HOME | End: 2019-12-18
Attending: EMERGENCY MEDICINE
Payer: COMMERCIAL

## 2019-12-18 VITALS
DIASTOLIC BLOOD PRESSURE: 72 MMHG | RESPIRATION RATE: 18 BRPM | TEMPERATURE: 98 F | HEART RATE: 86 BPM | OXYGEN SATURATION: 97 % | SYSTOLIC BLOOD PRESSURE: 117 MMHG

## 2019-12-18 DIAGNOSIS — F32.A DEPRESSION, UNSPECIFIED DEPRESSION TYPE: Primary | ICD-10-CM

## 2019-12-18 PROCEDURE — 99285 EMERGENCY DEPT VISIT HI MDM: CPT

## 2019-12-18 ASSESSMENT — ENCOUNTER SYMPTOMS
SHORTNESS OF BREATH: 0
SORE THROAT: 0
DIARRHEA: 0
ABDOMINAL DISTENTION: 0
WHEEZING: 0
NAUSEA: 0
COUGH: 0
RHINORRHEA: 0
VOMITING: 0
CONSTIPATION: 0

## 2020-03-25 PROBLEM — F10.930 ALCOHOL WITHDRAWAL SYNDROME WITHOUT COMPLICATION (HCC): Status: RESOLVED | Noted: 2019-02-19 | Resolved: 2020-03-24

## 2020-05-20 ENCOUNTER — HOSPITAL ENCOUNTER (INPATIENT)
Age: 50
LOS: 2 days | Discharge: HOME | DRG: 253 | End: 2020-05-22
Attending: EMERGENCY MEDICINE | Admitting: INTERNAL MEDICINE
Payer: COMMERCIAL

## 2020-05-20 ENCOUNTER — APPOINTMENT (OUTPATIENT)
Dept: CT IMAGING | Age: 50
DRG: 253 | End: 2020-05-20
Payer: COMMERCIAL

## 2020-05-20 LAB
ABSOLUTE EOS #: 0.22 K/UL (ref 0–0.44)
ABSOLUTE IMMATURE GRANULOCYTE: 0.03 K/UL (ref 0–0.3)
ABSOLUTE LYMPH #: 1.25 K/UL (ref 1.1–3.7)
ABSOLUTE MONO #: 0.49 K/UL (ref 0.1–1.2)
ALBUMIN SERPL-MCNC: 4.1 G/DL (ref 3.5–5.2)
ALBUMIN/GLOBULIN RATIO: 1.6 (ref 1–2.5)
ALP BLD-CCNC: 70 U/L (ref 40–129)
ALT SERPL-CCNC: 11 U/L (ref 5–41)
ANION GAP SERPL CALCULATED.3IONS-SCNC: 15 MMOL/L (ref 9–17)
AST SERPL-CCNC: 17 U/L
BASOPHILS # BLD: 0 % (ref 0–2)
BASOPHILS ABSOLUTE: <0.03 K/UL (ref 0–0.2)
BILIRUB SERPL-MCNC: 0.21 MG/DL (ref 0.3–1.2)
BUN BLDV-MCNC: 11 MG/DL (ref 6–20)
BUN/CREAT BLD: 17 (ref 9–20)
CALCIUM SERPL-MCNC: 9.4 MG/DL (ref 8.6–10.4)
CHLORIDE BLD-SCNC: 101 MMOL/L (ref 98–107)
CO2: 24 MMOL/L (ref 20–31)
CREAT SERPL-MCNC: 0.63 MG/DL (ref 0.7–1.2)
DIFFERENTIAL TYPE: ABNORMAL
EOSINOPHILS RELATIVE PERCENT: 4 % (ref 1–4)
GFR AFRICAN AMERICAN: >60 ML/MIN
GFR NON-AFRICAN AMERICAN: >60 ML/MIN
GFR SERPL CREATININE-BSD FRML MDRD: ABNORMAL ML/MIN/{1.73_M2}
GFR SERPL CREATININE-BSD FRML MDRD: ABNORMAL ML/MIN/{1.73_M2}
GLUCOSE BLD-MCNC: 96 MG/DL (ref 70–99)
HCT VFR BLD CALC: 39 % (ref 40.7–50.3)
HEMOGLOBIN: 13.3 G/DL (ref 13–17)
IMMATURE GRANULOCYTES: 1 %
INR BLD: 1
LYMPHOCYTES # BLD: 21 % (ref 24–43)
MCH RBC QN AUTO: 32 PG (ref 25.2–33.5)
MCHC RBC AUTO-ENTMCNC: 34.1 G/DL (ref 25.2–33.5)
MCV RBC AUTO: 94 FL (ref 82.6–102.9)
MONOCYTES # BLD: 8 % (ref 3–12)
NRBC AUTOMATED: 0 PER 100 WBC
PDW BLD-RTO: 12.8 % (ref 11.8–14.4)
PLATELET # BLD: 222 K/UL (ref 138–453)
PLATELET ESTIMATE: ABNORMAL
PMV BLD AUTO: 9.5 FL (ref 8.1–13.5)
POTASSIUM SERPL-SCNC: 4.1 MMOL/L (ref 3.7–5.3)
PROTHROMBIN TIME: 12.6 SEC (ref 11.5–14.2)
RBC # BLD: 4.15 M/UL (ref 4.21–5.77)
RBC # BLD: ABNORMAL 10*6/UL
SEG NEUTROPHILS: 66 % (ref 36–65)
SEGMENTED NEUTROPHILS ABSOLUTE COUNT: 3.97 K/UL (ref 1.5–8.1)
SODIUM BLD-SCNC: 140 MMOL/L (ref 135–144)
TOTAL PROTEIN: 6.7 G/DL (ref 6.4–8.3)
WBC # BLD: 6 K/UL (ref 3.5–11.3)
WBC # BLD: ABNORMAL 10*3/UL

## 2020-05-20 PROCEDURE — 6360000002 HC RX W HCPCS: Performed by: EMERGENCY MEDICINE

## 2020-05-20 PROCEDURE — 6370000000 HC RX 637 (ALT 250 FOR IP): Performed by: NURSE PRACTITIONER

## 2020-05-20 PROCEDURE — 96375 TX/PRO/DX INJ NEW DRUG ADDON: CPT

## 2020-05-20 PROCEDURE — 99285 EMERGENCY DEPT VISIT HI MDM: CPT

## 2020-05-20 PROCEDURE — 2709999900 CT ABDOMEN PELVIS W IV CONTRAST

## 2020-05-20 PROCEDURE — 85025 COMPLETE CBC W/AUTO DIFF WBC: CPT

## 2020-05-20 PROCEDURE — 94760 N-INVAS EAR/PLS OXIMETRY 1: CPT

## 2020-05-20 PROCEDURE — 2580000003 HC RX 258: Performed by: NURSE PRACTITIONER

## 2020-05-20 PROCEDURE — 96374 THER/PROPH/DIAG INJ IV PUSH: CPT

## 2020-05-20 PROCEDURE — 80307 DRUG TEST PRSMV CHEM ANLYZR: CPT

## 2020-05-20 PROCEDURE — 1200000000 HC SEMI PRIVATE

## 2020-05-20 PROCEDURE — 6360000002 HC RX W HCPCS: Performed by: NURSE PRACTITIONER

## 2020-05-20 PROCEDURE — 2580000003 HC RX 258: Performed by: EMERGENCY MEDICINE

## 2020-05-20 PROCEDURE — G0480 DRUG TEST DEF 1-7 CLASSES: HCPCS

## 2020-05-20 PROCEDURE — 85610 PROTHROMBIN TIME: CPT

## 2020-05-20 PROCEDURE — C9113 INJ PANTOPRAZOLE SODIUM, VIA: HCPCS | Performed by: EMERGENCY MEDICINE

## 2020-05-20 PROCEDURE — 6360000004 HC RX CONTRAST MEDICATION: Performed by: EMERGENCY MEDICINE

## 2020-05-20 PROCEDURE — 80053 COMPREHEN METABOLIC PANEL: CPT

## 2020-05-20 RX ORDER — LORAZEPAM 2 MG/ML
1 INJECTION INTRAMUSCULAR
Status: DISCONTINUED | OUTPATIENT
Start: 2020-05-20 | End: 2020-05-22 | Stop reason: HOSPADM

## 2020-05-20 RX ORDER — MORPHINE SULFATE 2 MG/ML
2 INJECTION, SOLUTION INTRAMUSCULAR; INTRAVENOUS
Status: DISCONTINUED | OUTPATIENT
Start: 2020-05-20 | End: 2020-05-22 | Stop reason: HOSPADM

## 2020-05-20 RX ORDER — QUETIAPINE FUMARATE 100 MG/1
300 TABLET, FILM COATED ORAL NIGHTLY
Status: DISCONTINUED | OUTPATIENT
Start: 2020-05-20 | End: 2020-05-22 | Stop reason: HOSPADM

## 2020-05-20 RX ORDER — PANTOPRAZOLE SODIUM 40 MG/10ML
40 INJECTION, POWDER, LYOPHILIZED, FOR SOLUTION INTRAVENOUS ONCE
Status: COMPLETED | OUTPATIENT
Start: 2020-05-20 | End: 2020-05-20

## 2020-05-20 RX ORDER — NICOTINE 21 MG/24HR
1 PATCH, TRANSDERMAL 24 HOURS TRANSDERMAL DAILY
Status: DISCONTINUED | OUTPATIENT
Start: 2020-05-21 | End: 2020-05-21

## 2020-05-20 RX ORDER — LORAZEPAM 2 MG/ML
2 INJECTION INTRAMUSCULAR
Status: DISCONTINUED | OUTPATIENT
Start: 2020-05-20 | End: 2020-05-22 | Stop reason: HOSPADM

## 2020-05-20 RX ORDER — TRAZODONE HYDROCHLORIDE 50 MG/1
50 TABLET ORAL NIGHTLY PRN
Status: DISCONTINUED | OUTPATIENT
Start: 2020-05-20 | End: 2020-05-22 | Stop reason: HOSPADM

## 2020-05-20 RX ORDER — ONDANSETRON 2 MG/ML
4 INJECTION INTRAMUSCULAR; INTRAVENOUS ONCE
Status: COMPLETED | OUTPATIENT
Start: 2020-05-20 | End: 2020-05-20

## 2020-05-20 RX ORDER — LORAZEPAM 1 MG/1
3 TABLET ORAL
Status: DISCONTINUED | OUTPATIENT
Start: 2020-05-20 | End: 2020-05-22 | Stop reason: HOSPADM

## 2020-05-20 RX ORDER — SODIUM CHLORIDE 9 MG/ML
INJECTION, SOLUTION INTRAVENOUS CONTINUOUS
Status: DISCONTINUED | OUTPATIENT
Start: 2020-05-20 | End: 2020-05-22 | Stop reason: HOSPADM

## 2020-05-20 RX ORDER — HYDROCODONE BITARTRATE AND ACETAMINOPHEN 5; 325 MG/1; MG/1
2 TABLET ORAL EVERY 4 HOURS PRN
Status: DISCONTINUED | OUTPATIENT
Start: 2020-05-20 | End: 2020-05-22 | Stop reason: HOSPADM

## 2020-05-20 RX ORDER — PROMETHAZINE HYDROCHLORIDE 25 MG/1
12.5 TABLET ORAL EVERY 6 HOURS PRN
Status: DISCONTINUED | OUTPATIENT
Start: 2020-05-20 | End: 2020-05-21

## 2020-05-20 RX ORDER — SODIUM CHLORIDE 0.9 % (FLUSH) 0.9 %
10 SYRINGE (ML) INJECTION PRN
Status: DISCONTINUED | OUTPATIENT
Start: 2020-05-20 | End: 2020-05-22 | Stop reason: HOSPADM

## 2020-05-20 RX ORDER — MORPHINE SULFATE 4 MG/ML
4 INJECTION, SOLUTION INTRAMUSCULAR; INTRAVENOUS
Status: DISCONTINUED | OUTPATIENT
Start: 2020-05-20 | End: 2020-05-22 | Stop reason: HOSPADM

## 2020-05-20 RX ORDER — SODIUM CHLORIDE 0.9 % (FLUSH) 0.9 %
10 SYRINGE (ML) INJECTION EVERY 12 HOURS SCHEDULED
Status: DISCONTINUED | OUTPATIENT
Start: 2020-05-20 | End: 2020-05-22 | Stop reason: HOSPADM

## 2020-05-20 RX ORDER — LORAZEPAM 2 MG/ML
0.5 INJECTION INTRAMUSCULAR ONCE
Status: COMPLETED | OUTPATIENT
Start: 2020-05-20 | End: 2020-05-20

## 2020-05-20 RX ORDER — LORAZEPAM 1 MG/1
1 TABLET ORAL
Status: DISCONTINUED | OUTPATIENT
Start: 2020-05-20 | End: 2020-05-22 | Stop reason: HOSPADM

## 2020-05-20 RX ORDER — LORAZEPAM 1 MG/1
2 TABLET ORAL
Status: DISCONTINUED | OUTPATIENT
Start: 2020-05-20 | End: 2020-05-22 | Stop reason: HOSPADM

## 2020-05-20 RX ORDER — SODIUM CHLORIDE 9 MG/ML
10 INJECTION INTRAVENOUS ONCE
Status: COMPLETED | OUTPATIENT
Start: 2020-05-20 | End: 2020-05-20

## 2020-05-20 RX ORDER — ACETAMINOPHEN 325 MG/1
650 TABLET ORAL EVERY 4 HOURS PRN
Status: DISCONTINUED | OUTPATIENT
Start: 2020-05-20 | End: 2020-05-22 | Stop reason: HOSPADM

## 2020-05-20 RX ORDER — LORAZEPAM 2 MG/ML
3 INJECTION INTRAMUSCULAR
Status: DISCONTINUED | OUTPATIENT
Start: 2020-05-20 | End: 2020-05-22 | Stop reason: HOSPADM

## 2020-05-20 RX ORDER — LORAZEPAM 2 MG/ML
1 INJECTION INTRAMUSCULAR ONCE
Status: COMPLETED | OUTPATIENT
Start: 2020-05-20 | End: 2020-05-20

## 2020-05-20 RX ORDER — LORAZEPAM 2 MG/ML
4 INJECTION INTRAMUSCULAR
Status: DISCONTINUED | OUTPATIENT
Start: 2020-05-20 | End: 2020-05-22 | Stop reason: HOSPADM

## 2020-05-20 RX ORDER — ONDANSETRON 2 MG/ML
4 INJECTION INTRAMUSCULAR; INTRAVENOUS EVERY 6 HOURS PRN
Status: DISCONTINUED | OUTPATIENT
Start: 2020-05-20 | End: 2020-05-22 | Stop reason: HOSPADM

## 2020-05-20 RX ORDER — LORAZEPAM 1 MG/1
4 TABLET ORAL
Status: DISCONTINUED | OUTPATIENT
Start: 2020-05-20 | End: 2020-05-22 | Stop reason: HOSPADM

## 2020-05-20 RX ORDER — 0.9 % SODIUM CHLORIDE 0.9 %
1000 INTRAVENOUS SOLUTION INTRAVENOUS ONCE
Status: COMPLETED | OUTPATIENT
Start: 2020-05-20 | End: 2020-05-20

## 2020-05-20 RX ORDER — HYDROCODONE BITARTRATE AND ACETAMINOPHEN 5; 325 MG/1; MG/1
1 TABLET ORAL EVERY 4 HOURS PRN
Status: DISCONTINUED | OUTPATIENT
Start: 2020-05-20 | End: 2020-05-22 | Stop reason: HOSPADM

## 2020-05-20 RX ADMIN — IOPAMIDOL 100 ML: 755 INJECTION, SOLUTION INTRAVENOUS at 17:32

## 2020-05-20 RX ADMIN — PANTOPRAZOLE SODIUM 40 MG: 40 INJECTION, POWDER, FOR SOLUTION INTRAVENOUS at 16:58

## 2020-05-20 RX ADMIN — LORAZEPAM 2 MG: 2 INJECTION INTRAMUSCULAR; INTRAVENOUS at 23:04

## 2020-05-20 RX ADMIN — LORAZEPAM 2 MG: 2 INJECTION INTRAMUSCULAR; INTRAVENOUS at 21:50

## 2020-05-20 RX ADMIN — SODIUM CHLORIDE 1000 ML: 9 INJECTION, SOLUTION INTRAVENOUS at 16:58

## 2020-05-20 RX ADMIN — ONDANSETRON 4 MG: 2 INJECTION INTRAMUSCULAR; INTRAVENOUS at 16:58

## 2020-05-20 RX ADMIN — QUETIAPINE FUMARATE 300 MG: 100 TABLET ORAL at 21:50

## 2020-05-20 RX ADMIN — LORAZEPAM 1 MG: 2 INJECTION INTRAMUSCULAR; INTRAVENOUS at 20:55

## 2020-05-20 RX ADMIN — LORAZEPAM 0.5 MG: 2 INJECTION INTRAMUSCULAR; INTRAVENOUS at 20:03

## 2020-05-20 RX ADMIN — Medication 10 ML: at 16:59

## 2020-05-20 RX ADMIN — SODIUM CHLORIDE: 9 INJECTION, SOLUTION INTRAVENOUS at 21:50

## 2020-05-20 ASSESSMENT — ENCOUNTER SYMPTOMS
SORE THROAT: 0
SHORTNESS OF BREATH: 0
WHEEZING: 0
ABDOMINAL PAIN: 1
BLOOD IN STOOL: 0
CONSTIPATION: 0
NAUSEA: 1
DIARRHEA: 0
VOMITING: 1
BACK PAIN: 0
TROUBLE SWALLOWING: 0

## 2020-05-20 ASSESSMENT — PAIN SCALES - GENERAL
PAINLEVEL_OUTOF10: 4
PAINLEVEL_OUTOF10: 0

## 2020-05-20 ASSESSMENT — PAIN DESCRIPTION - ORIENTATION: ORIENTATION: UPPER

## 2020-05-20 ASSESSMENT — PAIN DESCRIPTION - LOCATION: LOCATION: ABDOMEN

## 2020-05-20 ASSESSMENT — PAIN DESCRIPTION - PAIN TYPE: TYPE: ACUTE PAIN

## 2020-05-20 NOTE — ED PROVIDER NOTES
Hypertension, Intractable vomiting, Metabolic acidosis, increased anion gap, and Suicidal behavior. SURGICAL HISTORY      has a past surgical history that includes hernia repair; Upper gastrointestinal endoscopy (6-5-14); Inguinal hernia repair (05/2019); hernia repair (Right, 5/16/2019); Upper gastrointestinal endoscopy (N/A, 5/30/2019); and Upper gastrointestinal endoscopy (N/A, 12/5/2019). CURRENT MEDICATIONS       Previous Medications    BUPROPION (WELLBUTRIN XL) 150 MG EXTENDED RELEASE TABLET    Take 150 mg by mouth every morning    FLUTICASONE (FLONASE) 50 MCG/ACT NASAL SPRAY    2 sprays by Nasal route daily    FOLIC ACID (FOLVITE) 1 MG TABLET    Take 1 tablet by mouth daily    MIDODRINE (PROAMATINE) 5 MG TABLET    Take 1 tablet by mouth 3 times daily (with meals) Hold if systolic blood pressure is more than 110    MULTIPLE VITAMIN (MULTIVITAMIN) TABLET    Take 1 tablet by mouth daily    ONDANSETRON (ZOFRAN) 4 MG TABLET    Take 1 tablet by mouth every 8 hours as needed for Nausea or Vomiting    PANTOPRAZOLE (PROTONIX) 40 MG TABLET    Take 1 tablet by mouth every morning (before breakfast)    QUETIAPINE (SEROQUEL) 300 MG TABLET    Take 1 tablet by mouth nightly    TRAZODONE (DESYREL) 50 MG TABLET    Take 1 tablet by mouth nightly as needed for Sleep    VITAMIN B-1 100 MG TABLET    Take 1 tablet by mouth daily       ALLERGIES     is allergic to pcn [penicillins]. FAMILY HISTORY     is adopted. family history includes Asthma in his mother; Crohn's Disease in his mother; Depression in his daughter; No Known Problems in his brother, daughter, father, maternal grandfather, maternal grandmother, paternal grandfather, paternal grandmother, and son; Other in his sister. He was adopted. SOCIAL HISTORY      reports that he has never smoked. He has never used smokeless tobacco. He reports current alcohol use. He reports that he does not use drugs.     PHYSICAL EXAM     INITIAL VITALS:  height is 5' 9\" Immature Granulocytes 1 (*)     All other components within normal limits   COMPREHENSIVE METABOLIC PANEL - Abnormal; Notable for the following components:    CREATININE 0.63 (*)     Total Bilirubin 0.21 (*)     All other components within normal limits   TOX SCR, BLD, ED - Abnormal; Notable for the following components:    Ethanol 973 (*)     Salicylate Lvl <1 (*)     Acetaminophen Level <5 (*)     All other components within normal limits   PROTIME-INR           EMERGENCY DEPARTMENT COURSE:   Vitals:    Vitals:    05/20/20 1622 05/20/20 1801 05/20/20 1917   BP: 126/80 126/80 124/79   Pulse: 90 89 78   Resp: 14 14 14   Temp: 96.8 °F (36 °C)     TempSrc: Tympanic     SpO2: 95% 96% 98%   Weight: 150 lb (68 kg)     Height: 5' 9\" (1.753 m)       -------------------------  BP: 124/79, Temp: 96.8 °F (36 °C), Pulse: 78, Resp: 14      Re-evaluation Notes    Resting comfortably no further vomiting    CRITICAL CARE:   IP CONSULT TO GENERAL SURGERY  IP CONSULT TO HOSPITALIST        CONSULTS: The case was discussed with Dr. Maddie Prince:  None    FINAL IMPRESSION      1. Upper GI bleed    2. Acute alcoholic intoxication with complication ([facility] [address]          DISPOSITION/PLAN   DISPOSITION admitted    Condition on Disposition    Stable    PATIENT REFERRED TO:  No follow-up provider specified. DISCHARGE MEDICATIONS:  New Prescriptions    No medications on file       (Please note that portions of this note were completed with a voice recognition program.  Efforts were made to edit the dictations but occasionally words are mis-transcribed.)    Fontanez MD, F.A.A.E.M.   Attending Emergency Physician                          Yoselin Mcallister MD  05/20/20 5738

## 2020-05-21 LAB
ACETAMINOPHEN LEVEL: <5 UG/ML (ref 10–30)
AMMONIA: 22 UMOL/L (ref 16–60)
AMYLASE: 38 U/L (ref 28–100)
ANION GAP SERPL CALCULATED.3IONS-SCNC: 9 MMOL/L (ref 9–17)
BUN BLDV-MCNC: 11 MG/DL (ref 6–20)
BUN/CREAT BLD: 16 (ref 9–20)
CALCIUM IONIZED: 1.22 MMOL/L (ref 1.13–1.33)
CALCIUM SERPL-MCNC: 8.4 MG/DL (ref 8.6–10.4)
CHLORIDE BLD-SCNC: 106 MMOL/L (ref 98–107)
CO2: 25 MMOL/L (ref 20–31)
CREAT SERPL-MCNC: 0.67 MG/DL (ref 0.7–1.2)
ESTIMATED AVERAGE GLUCOSE: 103 MG/DL
ETHANOL PERCENT: ABNORMAL %
ETHANOL: 150 MG/DL
FIBRINOGEN: 234 MG/DL (ref 140–420)
GFR AFRICAN AMERICAN: >60 ML/MIN
GFR NON-AFRICAN AMERICAN: >60 ML/MIN
GFR SERPL CREATININE-BSD FRML MDRD: ABNORMAL ML/MIN/{1.73_M2}
GFR SERPL CREATININE-BSD FRML MDRD: ABNORMAL ML/MIN/{1.73_M2}
GLUCOSE BLD-MCNC: 82 MG/DL (ref 70–99)
HBA1C MFR BLD: 5.2 % (ref 4.8–5.9)
HCT VFR BLD CALC: 36 % (ref 40.7–50.3)
HEMOGLOBIN: 12 G/DL (ref 13–17)
HEMOGLOBIN: 12.2 G/DL (ref 13–17)
HEMOGLOBIN: 12.2 G/DL (ref 13–17)
HEMOGLOBIN: 12.5 G/DL (ref 13–17)
INR BLD: 1
LIPASE: 13 U/L (ref 13–60)
MCH RBC QN AUTO: 31.7 PG (ref 25.2–33.5)
MCHC RBC AUTO-ENTMCNC: 33.3 G/DL (ref 25.2–33.5)
MCV RBC AUTO: 95.2 FL (ref 82.6–102.9)
NRBC AUTOMATED: 0 PER 100 WBC
PDW BLD-RTO: 12.9 % (ref 11.8–14.4)
PHOSPHORUS: 3.3 MG/DL (ref 2.5–4.5)
PLATELET # BLD: 178 K/UL (ref 138–453)
PMV BLD AUTO: 9.6 FL (ref 8.1–13.5)
POTASSIUM SERPL-SCNC: 3.9 MMOL/L (ref 3.7–5.3)
PROTHROMBIN TIME: 13 SEC (ref 11.5–14.2)
RBC # BLD: 3.78 M/UL (ref 4.21–5.77)
SALICYLATE LEVEL: <1 MG/DL (ref 3–10)
SODIUM BLD-SCNC: 140 MMOL/L (ref 135–144)
TOXIC TRICYCLIC SC,BLOOD: NEGATIVE
TROPONIN INTERP: NORMAL
TROPONIN T: NORMAL NG/ML
TROPONIN, HIGH SENSITIVITY: <6 NG/L (ref 0–22)
TSH SERPL DL<=0.05 MIU/L-ACNC: 3.36 MIU/L (ref 0.3–5)
VITAMIN B-12: 298 PG/ML (ref 232–1245)
WBC # BLD: 4.5 K/UL (ref 3.5–11.3)

## 2020-05-21 PROCEDURE — 84100 ASSAY OF PHOSPHORUS: CPT

## 2020-05-21 PROCEDURE — 85018 HEMOGLOBIN: CPT

## 2020-05-21 PROCEDURE — 82607 VITAMIN B-12: CPT

## 2020-05-21 PROCEDURE — 85610 PROTHROMBIN TIME: CPT

## 2020-05-21 PROCEDURE — 85384 FIBRINOGEN ACTIVITY: CPT

## 2020-05-21 PROCEDURE — 84484 ASSAY OF TROPONIN QUANT: CPT

## 2020-05-21 PROCEDURE — 82330 ASSAY OF CALCIUM: CPT

## 2020-05-21 PROCEDURE — 83036 HEMOGLOBIN GLYCOSYLATED A1C: CPT

## 2020-05-21 PROCEDURE — APPSS30 APP SPLIT SHARED TIME 16-30 MINUTES: Performed by: NURSE PRACTITIONER

## 2020-05-21 PROCEDURE — 99253 IP/OBS CNSLTJ NEW/EST LOW 45: CPT | Performed by: SURGERY

## 2020-05-21 PROCEDURE — 94760 N-INVAS EAR/PLS OXIMETRY 1: CPT

## 2020-05-21 PROCEDURE — 80048 BASIC METABOLIC PNL TOTAL CA: CPT

## 2020-05-21 PROCEDURE — 2580000003 HC RX 258: Performed by: NURSE PRACTITIONER

## 2020-05-21 PROCEDURE — 1200000000 HC SEMI PRIVATE

## 2020-05-21 PROCEDURE — 6370000000 HC RX 637 (ALT 250 FOR IP): Performed by: NURSE PRACTITIONER

## 2020-05-21 PROCEDURE — 82150 ASSAY OF AMYLASE: CPT

## 2020-05-21 PROCEDURE — 6360000002 HC RX W HCPCS: Performed by: NURSE PRACTITIONER

## 2020-05-21 PROCEDURE — 85027 COMPLETE CBC AUTOMATED: CPT

## 2020-05-21 PROCEDURE — 6370000000 HC RX 637 (ALT 250 FOR IP): Performed by: SURGERY

## 2020-05-21 PROCEDURE — 99223 1ST HOSP IP/OBS HIGH 75: CPT | Performed by: INTERNAL MEDICINE

## 2020-05-21 PROCEDURE — 83690 ASSAY OF LIPASE: CPT

## 2020-05-21 PROCEDURE — 36415 COLL VENOUS BLD VENIPUNCTURE: CPT

## 2020-05-21 PROCEDURE — 84443 ASSAY THYROID STIM HORMONE: CPT

## 2020-05-21 PROCEDURE — 82140 ASSAY OF AMMONIA: CPT

## 2020-05-21 RX ORDER — PANTOPRAZOLE SODIUM 40 MG/1
40 TABLET, DELAYED RELEASE ORAL
Status: DISCONTINUED | OUTPATIENT
Start: 2020-05-21 | End: 2020-05-22 | Stop reason: HOSPADM

## 2020-05-21 RX ORDER — SUCRALFATE 1 G/1
1 TABLET ORAL EVERY 6 HOURS SCHEDULED
Status: DISCONTINUED | OUTPATIENT
Start: 2020-05-21 | End: 2020-05-22 | Stop reason: HOSPADM

## 2020-05-21 RX ADMIN — QUETIAPINE FUMARATE 300 MG: 100 TABLET ORAL at 21:10

## 2020-05-21 RX ADMIN — LORAZEPAM 3 MG: 2 INJECTION INTRAMUSCULAR; INTRAVENOUS at 01:25

## 2020-05-21 RX ADMIN — SUCRALFATE 1 G: 1 TABLET ORAL at 23:58

## 2020-05-21 RX ADMIN — SUCRALFATE 1 G: 1 TABLET ORAL at 17:10

## 2020-05-21 RX ADMIN — SODIUM CHLORIDE: 9 INJECTION, SOLUTION INTRAVENOUS at 08:34

## 2020-05-21 RX ADMIN — LORAZEPAM 3 MG: 2 INJECTION INTRAMUSCULAR; INTRAVENOUS at 00:13

## 2020-05-21 RX ADMIN — PANTOPRAZOLE SODIUM 40 MG: 40 TABLET, DELAYED RELEASE ORAL at 17:10

## 2020-05-21 RX ADMIN — SODIUM CHLORIDE: 9 INJECTION, SOLUTION INTRAVENOUS at 21:16

## 2020-05-21 RX ADMIN — SODIUM CHLORIDE: 9 INJECTION, SOLUTION INTRAVENOUS at 15:15

## 2020-05-21 ASSESSMENT — PAIN SCALES - GENERAL: PAINLEVEL_OUTOF10: 0

## 2020-05-21 NOTE — PROGRESS NOTES
Pt denies any symptoms at this time. Pt states that he is tolerating his liquid diet and is denying abd pain or nausea. Pt has refused carafate and is refusing protonix at this time.

## 2020-05-21 NOTE — H&P
HOSPITALIST ADMISSION H&P      REASON FOR ADMISSION: hematemesis -- epigastric pain -- alcohol abuse  ESTIMATED LENGTH OF STAY:>2 midnights, 3-4 days    ATTENDING/ADMITTING PHYSICIAN: Arpan Curry MD  PCP: . None (Inactive)    HISTORY OF PRESENT ILLNESS:      The patient is a 45 y.o. male patient of . None (Inactive) who presents from the ER with c/o vomiting blood, epigastric pain, and nausea and vomiting. History is obtained from the patient and chart due to patient drowsiness this morning. History of alcoholism -- was sober for several months and began heavy drinking again about 10 days ago. He described his abdominal pain as epigastric in location, constant, worse after vomiting, rated a 4 on a pain scale of 0-10, and non-radiating. He has a history of GI bleed, gastritis, esophagitis, and duodenitis. His last EGD was 12/2019 by Dr. Mon July. In ER, blood ethanol level was 150, hemoglobin 13.3 --> 12.2, CT of the abdomen/pelvis impression:   trace free fluid dependently within the pelvis on the right, stable. This is nonspecific but abnormal for a male patient. There may be a small hiatal hernia. See below for additional PMH. Patient vact-sjduexcrwi-xgfycrcc-available records reviewed, including, but not limited to ER records, imaging results, lab results, office records, personal records, and OARRS -- no signs of abuse or diversion. Past Medical History:   Diagnosis Date    Alcohol abuse 6/5/2014    GI bleed 6/5/2014    Hematemesis 6/5/2014    Hypertension     Per patient BP only increases when he drinks alcohol.     Intractable vomiting 3/5/4460    Metabolic acidosis, increased anion gap 5/29/2019    Suicidal behavior 12/13/2018           Past Surgical History:   Procedure Laterality Date    HERNIA REPAIR      left    HERNIA REPAIR Right 5/16/2019    XI ROBOTIC LAPAROSCOPIC  INGUINAL HERNIA REPAIR WITH MESH, POSSIBLE BILATERAL, POSSIBLE OPEN performed by [place] IV, DO at daughter, father, maternal grandfather, maternal grandmother, paternal grandfather, paternal grandmother, and son; Other in his sister. He was adopted. REVIEW OF SYSTEMS:  See HPI and problem list; otherwise no other new complaints with respect to eyes, ENT, neck, pulmonary, coronary, chest, GI, , endocrine, musculoskeletal, immune system/connective tissue disease, hematologic, neurologic, psychiatric, skin, lymphatics, or malignancies. Code status -- Full Code at this time.     PHYSICAL EXAM:  Vitals:  /71   Pulse 87   Temp 96.4 °F (35.8 °C) (Tympanic)   Resp 18   Ht 5' 9\" (1.753 m)   Wt 159 lb (72.1 kg)   SpO2 98%   BMI 23.48 kg/m²     General: drowsy  HEENT: PERRLA, Mucosa Pink, Moist, External nose normal, Normocephalic and Atraumatic  Neck: Supple, Carotid Pulses Present, No Masses, Tenderness, Nodularity and No Lymphadenopathy  Chest/Lungs: Clear to Auscultation without Rales, Rhonchi, or Wheezes and Respirations even and unlabored  Cardiac: Regular Rate and Rhythm without Rubs, Clicks, Gallops, or Murmurs and Pedal Pulses Palpable Bilaterally  GI/Abdomen: rounded, Bowel Sounds Present and Soft, Non-tender, without Guarding or Rebound Tenderness  : Not examined  Extremities/Musculoskeletal: All four extremities without edema and Generalized weakness (sedated)  Skin: No Cyanosis, No rash and Skin warm and dry  Neuro: drowsy -- sedated, No Localizing Signs/Symptoms and follows some simple commands  Psychiatric: poor concentration -- agitated at times      LABS:    CBC with Differential:    Lab Results   Component Value Date    WBC 4.5 05/21/2020    RBC 3.78 05/21/2020    HGB 12.0 05/21/2020    HCT 36.0 05/21/2020     05/21/2020    MCV 95.2 05/21/2020    MCH 31.7 05/21/2020    MCHC 33.3 05/21/2020    RDW 12.9 05/21/2020    LYMPHOPCT 21 05/20/2020    MONOPCT 8 05/20/2020    BASOPCT 0 05/20/2020    MONOSABS 0.49 05/20/2020    LYMPHSABS 1.25 05/20/2020    EOSABS 0.22 05/20/2020 resumed ETOH abuse--2020           Prior alcohol withdrawal syndrome           Alcohol intoxication---binge  Hypertension             2D ECHO----6.17.2019---NLVSF--NRVSF---trace TR--RVSP ~ 23 mm Hg---                              normal aortic root dimension---IVC small but normal inspiratory                              collapse--LVEF ~ 55-60%  Depression---anxiety   Hepatic steatosis  CKD---Stage 2  Prior:          EGD----12. 5.2019---small HH--no bleeding or evidence of bleeding--small pylorus          GI bleed---12.4.2019--nausea----hematemesis          Prior GI bleed---alcohol abuse---gastritis---duodenitis--esophagitis--                          intractable nausea-vomiting           CT abdomen-pelvis--12.4.2019---mild fat-stranding along cecum without                         thickening--normal appendix---small amount fluid deep pelvis---                         small HH----sigmoid diverticulosis  PMH:   see above, low serum aldosterone, suicidal ideation--12.4.2019--then denied,               seizure with alcohol withdrawal at least once   PSH:    EGD---5.30.2019, RIH--mesh--5.16.2019, EGD---2014, LI    Allergies:  penicillin---rash         PLAN:    1. Hematemesis and epigastric pain -- general surgery consult, NPO, PPI, monitor Hgb/Hct, no anticoagulation  2. Nausea and vomiting -- antiemetics prn, IV hydration  3. Alcohol abuse -- CIWA protocol, ativan prn, maintain patient safety  4. SCDs for DVT prophylaxis  5. Home medications reviewed  6.  See orders     Note that over 50 minutes was spent in evaluation of the patient, review of the chart and pertinent records, discussion with family/staff, etc    Marlyn CASTELLANOS, NP-C, FNP-BC  8:54 AM  5/21/2020

## 2020-05-21 NOTE — CONSULTS
includes Asthma in his mother; Crohn's Disease in his mother; Depression in his daughter; No Known Problems in his brother, daughter, father, maternal grandfather, maternal grandmother, paternal grandfather, paternal grandmother, and son; Other in his sister. He was adopted. Review of Systems:   Difficult to obtain due to somnolence (pt recently received Ativan as part of [facility] protocol).  Denies abdominal or chest pain    Allergies: Pcn [penicillins]    Current Meds:  Current Facility-Administered Medications:     QUEtiapine (SEROQUEL) tablet 300 mg, 300 mg, Oral, Nightly, Fredy Pulse, APRN - CNP, 300 mg at 05/20/20 2150    traZODone (DESYREL) tablet 50 mg, 50 mg, Oral, Nightly PRN, [place] Pulse, APRN - CNP    0.9 % sodium chloride infusion, , Intravenous, Continuous, Fredy Pulse, APRN - CNP, Last Rate: 150 mL/hr at 05/20/20 2150    sodium chloride flush 0.9 % injection 10 mL, 10 mL, Intravenous, 2 times per day, [place] Pulse, APRN - CNP    sodium chloride flush 0.9 % injection 10 mL, 10 mL, Intravenous, PRN, Fredy Pulse, APRN - CNP    acetaminophen (TYLENOL) tablet 650 mg, 650 mg, Oral, Q4H PRN, Fredy Pulse, APRN - CNP    HYDROcodone-acetaminophen (NORCO) 5-325 MG per tablet 1 tablet, 1 tablet, Oral, Q4H PRN **OR** HYDROcodone-acetaminophen (NORCO) 5-325 MG per tablet 2 tablet, 2 tablet, Oral, Q4H PRN, Fredy Pulse, APRN - CNP    morphine (PF) injection 2 mg, 2 mg, Intravenous, Q2H PRN **OR** morphine injection 4 mg, 4 mg, Intravenous, Q2H PRN, [place] Pulse, APRN - CNP    promethazine (PHENERGAN) tablet 12.5 mg, 12.5 mg, Oral, Q6H PRN **OR** ondansetron (ZOFRAN) injection 4 mg, 4 mg, Intravenous, Q6H PRN, [place] Pulse, APRN - CNP    LORazepam (ATIVAN) tablet 1 mg, 1 mg, Oral, Q1H PRN **OR** LORazepam (ATIVAN) injection 1 mg, 1 mg, Intravenous, Q1H PRN **OR** LORazepam (ATIVAN) tablet 2 mg, 2 mg, Oral, Q1H PRN **OR** LORazepam (ATIVAN) injection 2 mg, 2 mg, Intravenous, Q1H PRN, 2 mg at 05/20/20 2304 **OR** LORazepam (ATIVAN) tablet 3 mg, 3 mg, Oral, Q1H PRN **OR** LORazepam (ATIVAN) injection 3 mg, 3 mg, Intravenous, Q1H PRN, 3 mg at 05/21/20 0125 **OR** LORazepam (ATIVAN) tablet 4 mg, 4 mg, Oral, Q1H PRN **OR** LORazepam (ATIVAN) injection 4 mg, 4 mg, Intravenous, Q1H PRN, Claude Gain, APRN - CNP    Vital Signs:  Vitals:    05/21/20 0641   BP: 112/71   Pulse: 87   Resp: 18   Temp: 96.4 °F (35.8 °C)   SpO2: 98%       Physical Exam:  Vital signs and Nurse's note reviewed    General Appearance:  Somnolent, no acute distress, well developed, well nourished   Skin:  Skin color, texture, turgor normal. No rashes or lesions. Head/face:  NCAT, face symmetrical  Eyes:  PERRL, no evidence of conjunctivitis or ptosis bilaterally  Ears:  External ears and canals grossly normal, no evidence of otorrhea. Nose/Sinuses:  Nares normal. Septum midline. Mucosa normal. No external drainage noted. Mouth/Neck:  Mucosa moist.  No external oral lesions. Trachea midline. No visible masses. Lungs:  Normal chest expansion, unlabored breathing without accessory muscle use. No audible rales, rhonchi, or wheezing. Cardiovascular: S1S2. No evidence of JVD. No evidence of pulsatile masses in abdomen  Abdomen:  Soft, non-tender, no organomegaly, no masses. Musculoskeletal: No evidence of bony/muscular deformities, trauma, atrophy of either left/right upper/lower extremity. No evidence of digital clubbing or cyanosis.   Neurologic:  Unable to determine due to pt condition  Psychiatric: unable to determine due to pt condition    Labs:   CBC:   Recent Labs     05/20/20  1653 05/21/20  0213 05/21/20  0549   WBC 6.0  --  4.5   HGB 13.3 12.2* 12.0*     --  178     BMP:    Recent Labs     05/20/20  1653 05/21/20  0549    140   K 4.1 3.9    106   CO2 24 25   BUN 11 11   CREATININE 0.63* 0.67*   GLUCOSE 96 82     Hepatic:   Recent Labs

## 2020-05-22 VITALS
OXYGEN SATURATION: 96 % | WEIGHT: 160.1 LBS | TEMPERATURE: 97.8 F | DIASTOLIC BLOOD PRESSURE: 78 MMHG | SYSTOLIC BLOOD PRESSURE: 130 MMHG | BODY MASS INDEX: 23.71 KG/M2 | HEART RATE: 79 BPM | RESPIRATION RATE: 16 BRPM | HEIGHT: 69 IN

## 2020-05-22 LAB
ABSOLUTE EOS #: 0.2 K/UL (ref 0–0.44)
ABSOLUTE IMMATURE GRANULOCYTE: <0.03 K/UL (ref 0–0.3)
ABSOLUTE LYMPH #: 0.83 K/UL (ref 1.1–3.7)
ABSOLUTE MONO #: 0.36 K/UL (ref 0.1–1.2)
ANION GAP SERPL CALCULATED.3IONS-SCNC: 9 MMOL/L (ref 9–17)
BASOPHILS # BLD: 1 % (ref 0–2)
BASOPHILS ABSOLUTE: <0.03 K/UL (ref 0–0.2)
BUN BLDV-MCNC: 9 MG/DL (ref 6–20)
BUN/CREAT BLD: 16 (ref 9–20)
CALCIUM SERPL-MCNC: 7.6 MG/DL (ref 8.6–10.4)
CHLORIDE BLD-SCNC: 107 MMOL/L (ref 98–107)
CO2: 23 MMOL/L (ref 20–31)
CREAT SERPL-MCNC: 0.58 MG/DL (ref 0.7–1.2)
DIFFERENTIAL TYPE: ABNORMAL
EOSINOPHILS RELATIVE PERCENT: 5 % (ref 1–4)
GFR AFRICAN AMERICAN: >60 ML/MIN
GFR NON-AFRICAN AMERICAN: >60 ML/MIN
GFR SERPL CREATININE-BSD FRML MDRD: ABNORMAL ML/MIN/{1.73_M2}
GFR SERPL CREATININE-BSD FRML MDRD: ABNORMAL ML/MIN/{1.73_M2}
GLUCOSE BLD-MCNC: 82 MG/DL (ref 70–99)
HCT VFR BLD CALC: 34.6 % (ref 40.7–50.3)
HEMOGLOBIN: 11.8 G/DL (ref 13–17)
IMMATURE GRANULOCYTES: 0 %
LYMPHOCYTES # BLD: 22 % (ref 24–43)
MCH RBC QN AUTO: 32.2 PG (ref 25.2–33.5)
MCHC RBC AUTO-ENTMCNC: 34.1 G/DL (ref 25.2–33.5)
MCV RBC AUTO: 94.3 FL (ref 82.6–102.9)
MONOCYTES # BLD: 10 % (ref 3–12)
NRBC AUTOMATED: ABNORMAL PER 100 WBC
PDW BLD-RTO: 12.2 % (ref 11.8–14.4)
PLATELET # BLD: 195 K/UL (ref 138–453)
PLATELET ESTIMATE: ABNORMAL
PMV BLD AUTO: 10.1 FL (ref 8.1–13.5)
POTASSIUM SERPL-SCNC: 3.8 MMOL/L (ref 3.7–5.3)
RBC # BLD: 3.67 M/UL (ref 4.21–5.77)
RBC # BLD: ABNORMAL 10*6/UL
SEG NEUTROPHILS: 63 % (ref 36–65)
SEGMENTED NEUTROPHILS ABSOLUTE COUNT: 2.38 K/UL (ref 1.5–8.1)
SODIUM BLD-SCNC: 139 MMOL/L (ref 135–144)
WBC # BLD: 3.8 K/UL (ref 3.5–11.3)
WBC # BLD: ABNORMAL 10*3/UL

## 2020-05-22 PROCEDURE — 85025 COMPLETE CBC W/AUTO DIFF WBC: CPT

## 2020-05-22 PROCEDURE — 36415 COLL VENOUS BLD VENIPUNCTURE: CPT

## 2020-05-22 PROCEDURE — 2580000003 HC RX 258: Performed by: NURSE PRACTITIONER

## 2020-05-22 PROCEDURE — 80048 BASIC METABOLIC PNL TOTAL CA: CPT

## 2020-05-22 PROCEDURE — 6370000000 HC RX 637 (ALT 250 FOR IP): Performed by: SURGERY

## 2020-05-22 RX ADMIN — SUCRALFATE 1 G: 1 TABLET ORAL at 06:27

## 2020-05-22 RX ADMIN — PANTOPRAZOLE SODIUM 40 MG: 40 TABLET, DELAYED RELEASE ORAL at 06:27

## 2020-05-22 RX ADMIN — SODIUM CHLORIDE: 9 INJECTION, SOLUTION INTRAVENOUS at 04:21

## 2020-05-22 NOTE — DISCHARGE SUMMARY
[address]                 [address] [place] [address], 00 [facility]                               DISCHARGE SUMMARY    PATIENT NAME: Dimitrios ZENG                     :        1970  MED REC NO:   [id number]                             ROOM:       0203  ACCOUNT NO:   [de-identified]                           ADMIT DATE: 2020  PROVIDER:     Tresa Barrera. Jorje Ahumada, MD                  DISCHARGE DATE:  2020      The patient left AMA. ATTENDING PHYSICIAN OF HOSPITALIZATION:  Omayra Morocho MD    The patient seen by General Surgery this hospitalization. DIAGNOSES:  1. Hematemesis, 2020. CT scan of the abdomen and pelvis,  2020, trace free fluid dependently in the right pelvis, stable,  abnormal _____, small hiatal hernia. Alcohol intoxication, 2020.  2.  Alcoholism, brief abstinence then resumed alcohol abuse, . Prior alcohol withdrawal syndrome, alcohol intoxication, binge patterns  typically. 3.  Hypertension. 4.  Depression, anxiety. 5.  Hepatic steatosis. 6.  Chronic kidney disease, stage 2. Other medical problems set forth in the progress note of 2020,  incorporated for reference herein and/or H and P.    HISTORY  [address] COURSE:  A 58-year-old white  male, [place]. The patient presented with reported hematemesis,  2020. Alcohol intoxication, the patient was on CIWA; was seen by  General Surgery during the course of this hospitalization. Recommended  at this time, serial following of the hemoglobin which remained stable;  the patient is being at 12.2, 12, 12.2, 12.5, and 11.8       Early in the morning on 2020, the patient  abruptly stated he was leaving, signed out AMA. LABORATORY DATA:  On 2020, white cell count was 3.8, hemoglobin at  11.8, hematocrit 34.6, and platelets 686,474.   Sodium 139, potassium  3.8, chloride 107, CO2 of 23, BUN 9, creatinine 0.58, glucose was

## 2020-07-21 ENCOUNTER — HOSPITAL ENCOUNTER (INPATIENT)
Age: 50
LOS: 1 days | Discharge: HOME | DRG: 425 | End: 2020-07-22
Attending: EMERGENCY MEDICINE | Admitting: FAMILY MEDICINE
Payer: COMMERCIAL

## 2020-07-21 PROBLEM — E87.6 HYPOKALEMIA: Status: ACTIVE | Noted: 2020-07-21

## 2020-07-21 LAB
ABSOLUTE EOS #: 0.03 K/UL (ref 0–0.44)
ABSOLUTE IMMATURE GRANULOCYTE: <0.03 K/UL (ref 0–0.3)
ABSOLUTE LYMPH #: 1.17 K/UL (ref 1.1–3.7)
ABSOLUTE MONO #: 0.81 K/UL (ref 0.1–1.2)
ALBUMIN SERPL-MCNC: 4.2 G/DL (ref 3.5–5.2)
ALBUMIN/GLOBULIN RATIO: 1.5 (ref 1–2.5)
ALP BLD-CCNC: 72 U/L (ref 40–129)
ALT SERPL-CCNC: 13 U/L (ref 5–41)
AMYLASE: 63 U/L (ref 28–100)
ANION GAP SERPL CALCULATED.3IONS-SCNC: 16 MMOL/L (ref 9–17)
ANION GAP SERPL CALCULATED.3IONS-SCNC: 21 MMOL/L (ref 9–17)
AST SERPL-CCNC: 23 U/L
BASOPHILS # BLD: 1 % (ref 0–2)
BASOPHILS ABSOLUTE: 0.03 K/UL (ref 0–0.2)
BILIRUB SERPL-MCNC: 0.94 MG/DL (ref 0.3–1.2)
BUN BLDV-MCNC: 6 MG/DL (ref 6–20)
BUN BLDV-MCNC: 8 MG/DL (ref 6–20)
BUN/CREAT BLD: 10 (ref 9–20)
BUN/CREAT BLD: 12 (ref 9–20)
CALCIUM SERPL-MCNC: 7.7 MG/DL (ref 8.6–10.4)
CALCIUM SERPL-MCNC: 8.7 MG/DL (ref 8.6–10.4)
CHLORIDE BLD-SCNC: 100 MMOL/L (ref 98–107)
CHLORIDE BLD-SCNC: 95 MMOL/L (ref 98–107)
CO2: 21 MMOL/L (ref 20–31)
CO2: 22 MMOL/L (ref 20–31)
CREAT SERPL-MCNC: 0.6 MG/DL (ref 0.7–1.2)
CREAT SERPL-MCNC: 0.65 MG/DL (ref 0.7–1.2)
DIFFERENTIAL TYPE: ABNORMAL
EOSINOPHILS RELATIVE PERCENT: 1 % (ref 1–4)
GFR AFRICAN AMERICAN: >60 ML/MIN
GFR AFRICAN AMERICAN: >60 ML/MIN
GFR NON-AFRICAN AMERICAN: >60 ML/MIN
GFR NON-AFRICAN AMERICAN: >60 ML/MIN
GFR SERPL CREATININE-BSD FRML MDRD: ABNORMAL ML/MIN/{1.73_M2}
GLUCOSE BLD-MCNC: 101 MG/DL (ref 70–99)
GLUCOSE BLD-MCNC: 106 MG/DL (ref 70–99)
HCT VFR BLD CALC: 43.1 % (ref 40.7–50.3)
HEMOGLOBIN: 15.5 G/DL (ref 13–17)
IMMATURE GRANULOCYTES: 0 %
LIPASE: 38 U/L (ref 13–60)
LYMPHOCYTES # BLD: 21 % (ref 24–43)
MAGNESIUM: 2.1 MG/DL (ref 1.6–2.6)
MCH RBC QN AUTO: 33.2 PG (ref 25.2–33.5)
MCHC RBC AUTO-ENTMCNC: 36 G/DL (ref 25.2–33.5)
MCV RBC AUTO: 92.3 FL (ref 82.6–102.9)
MONOCYTES # BLD: 15 % (ref 3–12)
NRBC AUTOMATED: 0 PER 100 WBC
PDW BLD-RTO: 13 % (ref 11.8–14.4)
PLATELET # BLD: 251 K/UL (ref 138–453)
PLATELET ESTIMATE: ABNORMAL
PMV BLD AUTO: 9.5 FL (ref 8.1–13.5)
POTASSIUM SERPL-SCNC: 3 MMOL/L (ref 3.7–5.3)
POTASSIUM SERPL-SCNC: 3.4 MMOL/L (ref 3.7–5.3)
RBC # BLD: 4.67 M/UL (ref 4.21–5.77)
RBC # BLD: ABNORMAL 10*6/UL
SEG NEUTROPHILS: 62 % (ref 36–65)
SEGMENTED NEUTROPHILS ABSOLUTE COUNT: 3.41 K/UL (ref 1.5–8.1)
SODIUM BLD-SCNC: 137 MMOL/L (ref 135–144)
SODIUM BLD-SCNC: 138 MMOL/L (ref 135–144)
TOTAL PROTEIN: 7 G/DL (ref 6.4–8.3)
WBC # BLD: 5.5 K/UL (ref 3.5–11.3)
WBC # BLD: ABNORMAL 10*3/UL

## 2020-07-21 PROCEDURE — 2060000000 HC ICU INTERMEDIATE R&B

## 2020-07-21 PROCEDURE — 96365 THER/PROPH/DIAG IV INF INIT: CPT

## 2020-07-21 PROCEDURE — 96366 THER/PROPH/DIAG IV INF ADDON: CPT

## 2020-07-21 PROCEDURE — 80053 COMPREHEN METABOLIC PANEL: CPT

## 2020-07-21 PROCEDURE — C9113 INJ PANTOPRAZOLE SODIUM, VIA: HCPCS

## 2020-07-21 PROCEDURE — 6360000002 HC RX W HCPCS: Performed by: NURSE PRACTITIONER

## 2020-07-21 PROCEDURE — 6370000000 HC RX 637 (ALT 250 FOR IP): Performed by: NURSE PRACTITIONER

## 2020-07-21 PROCEDURE — 82150 ASSAY OF AMYLASE: CPT

## 2020-07-21 PROCEDURE — 6360000002 HC RX W HCPCS

## 2020-07-21 PROCEDURE — 36415 COLL VENOUS BLD VENIPUNCTURE: CPT

## 2020-07-21 PROCEDURE — 85025 COMPLETE CBC W/AUTO DIFF WBC: CPT

## 2020-07-21 PROCEDURE — 6360000002 HC RX W HCPCS: Performed by: EMERGENCY MEDICINE

## 2020-07-21 PROCEDURE — 96375 TX/PRO/DX INJ NEW DRUG ADDON: CPT

## 2020-07-21 PROCEDURE — 83690 ASSAY OF LIPASE: CPT

## 2020-07-21 PROCEDURE — 80048 BASIC METABOLIC PNL TOTAL CA: CPT

## 2020-07-21 PROCEDURE — 2580000003 HC RX 258: Performed by: EMERGENCY MEDICINE

## 2020-07-21 PROCEDURE — 83735 ASSAY OF MAGNESIUM: CPT

## 2020-07-21 PROCEDURE — 99285 EMERGENCY DEPT VISIT HI MDM: CPT

## 2020-07-21 PROCEDURE — 2500000003 HC RX 250 WO HCPCS: Performed by: NURSE PRACTITIONER

## 2020-07-21 RX ORDER — BUPROPION HYDROCHLORIDE 150 MG/1
150 TABLET ORAL EVERY MORNING
Status: DISCONTINUED | OUTPATIENT
Start: 2020-07-22 | End: 2020-07-22 | Stop reason: HOSPADM

## 2020-07-21 RX ORDER — LORAZEPAM 2 MG/ML
3 INJECTION INTRAMUSCULAR
Status: DISCONTINUED | OUTPATIENT
Start: 2020-07-21 | End: 2020-07-22 | Stop reason: HOSPADM

## 2020-07-21 RX ORDER — 0.9 % SODIUM CHLORIDE 0.9 %
1000 INTRAVENOUS SOLUTION INTRAVENOUS ONCE
Status: COMPLETED | OUTPATIENT
Start: 2020-07-21 | End: 2020-07-21

## 2020-07-21 RX ORDER — MULTIVITAMIN WITH IRON
1 TABLET ORAL DAILY
Status: DISCONTINUED | OUTPATIENT
Start: 2020-07-21 | End: 2020-07-22 | Stop reason: HOSPADM

## 2020-07-21 RX ORDER — SODIUM CHLORIDE 0.9 % (FLUSH) 0.9 %
10 SYRINGE (ML) INJECTION EVERY 12 HOURS SCHEDULED
Status: DISCONTINUED | OUTPATIENT
Start: 2020-07-21 | End: 2020-07-22 | Stop reason: HOSPADM

## 2020-07-21 RX ORDER — LORAZEPAM 2 MG/ML
1 INJECTION INTRAMUSCULAR ONCE
Status: COMPLETED | OUTPATIENT
Start: 2020-07-21 | End: 2020-07-21

## 2020-07-21 RX ORDER — SODIUM CHLORIDE 9 MG/ML
10 INJECTION INTRAVENOUS ONCE
Status: COMPLETED | OUTPATIENT
Start: 2020-07-21 | End: 2020-07-21

## 2020-07-21 RX ORDER — PROMETHAZINE HYDROCHLORIDE 25 MG/1
12.5 TABLET ORAL EVERY 6 HOURS PRN
Status: DISCONTINUED | OUTPATIENT
Start: 2020-07-21 | End: 2020-07-22 | Stop reason: HOSPADM

## 2020-07-21 RX ORDER — ACETAMINOPHEN 325 MG/1
650 TABLET ORAL EVERY 6 HOURS PRN
Status: DISCONTINUED | OUTPATIENT
Start: 2020-07-21 | End: 2020-07-22 | Stop reason: HOSPADM

## 2020-07-21 RX ORDER — THIAMINE MONONITRATE (VIT B1) 100 MG
100 TABLET ORAL DAILY
Status: DISCONTINUED | OUTPATIENT
Start: 2020-07-21 | End: 2020-07-22 | Stop reason: HOSPADM

## 2020-07-21 RX ORDER — PANTOPRAZOLE SODIUM 40 MG/10ML
40 INJECTION, POWDER, LYOPHILIZED, FOR SOLUTION INTRAVENOUS ONCE
Status: COMPLETED | OUTPATIENT
Start: 2020-07-21 | End: 2020-07-21

## 2020-07-21 RX ORDER — PANTOPRAZOLE SODIUM 40 MG/1
40 TABLET, DELAYED RELEASE ORAL
Status: DISCONTINUED | OUTPATIENT
Start: 2020-07-22 | End: 2020-07-22 | Stop reason: HOSPADM

## 2020-07-21 RX ORDER — FOLIC ACID 1 MG/1
1 TABLET ORAL DAILY
Status: DISCONTINUED | OUTPATIENT
Start: 2020-07-21 | End: 2020-07-22 | Stop reason: HOSPADM

## 2020-07-21 RX ORDER — LORAZEPAM 2 MG/ML
4 INJECTION INTRAMUSCULAR
Status: DISCONTINUED | OUTPATIENT
Start: 2020-07-21 | End: 2020-07-22 | Stop reason: HOSPADM

## 2020-07-21 RX ORDER — LORAZEPAM 2 MG/ML
1 INJECTION INTRAMUSCULAR
Status: DISCONTINUED | OUTPATIENT
Start: 2020-07-21 | End: 2020-07-22 | Stop reason: HOSPADM

## 2020-07-21 RX ORDER — MAGNESIUM SULFATE 1 G/100ML
1 INJECTION INTRAVENOUS PRN
Status: DISCONTINUED | OUTPATIENT
Start: 2020-07-21 | End: 2020-07-22 | Stop reason: HOSPADM

## 2020-07-21 RX ORDER — ONDANSETRON 2 MG/ML
4 INJECTION INTRAMUSCULAR; INTRAVENOUS ONCE
Status: COMPLETED | OUTPATIENT
Start: 2020-07-21 | End: 2020-07-21

## 2020-07-21 RX ORDER — LORAZEPAM 1 MG/1
4 TABLET ORAL
Status: DISCONTINUED | OUTPATIENT
Start: 2020-07-21 | End: 2020-07-22 | Stop reason: HOSPADM

## 2020-07-21 RX ORDER — SODIUM CHLORIDE AND POTASSIUM CHLORIDE .9; .15 G/100ML; G/100ML
SOLUTION INTRAVENOUS CONTINUOUS
Status: DISCONTINUED | OUTPATIENT
Start: 2020-07-21 | End: 2020-07-21

## 2020-07-21 RX ORDER — LORAZEPAM 1 MG/1
1 TABLET ORAL
Status: DISCONTINUED | OUTPATIENT
Start: 2020-07-21 | End: 2020-07-22 | Stop reason: HOSPADM

## 2020-07-21 RX ORDER — NICOTINE 21 MG/24HR
1 PATCH, TRANSDERMAL 24 HOURS TRANSDERMAL DAILY PRN
Status: DISCONTINUED | OUTPATIENT
Start: 2020-07-21 | End: 2020-07-22 | Stop reason: HOSPADM

## 2020-07-21 RX ORDER — DEXTROSE, SODIUM CHLORIDE, AND POTASSIUM CHLORIDE 5; .45; .15 G/100ML; G/100ML; G/100ML
INJECTION INTRAVENOUS CONTINUOUS
Status: DISCONTINUED | OUTPATIENT
Start: 2020-07-21 | End: 2020-07-22 | Stop reason: HOSPADM

## 2020-07-21 RX ORDER — ACETAMINOPHEN 650 MG/1
650 SUPPOSITORY RECTAL EVERY 6 HOURS PRN
Status: DISCONTINUED | OUTPATIENT
Start: 2020-07-21 | End: 2020-07-22 | Stop reason: HOSPADM

## 2020-07-21 RX ORDER — LORAZEPAM 1 MG/1
2 TABLET ORAL
Status: DISCONTINUED | OUTPATIENT
Start: 2020-07-21 | End: 2020-07-22 | Stop reason: HOSPADM

## 2020-07-21 RX ORDER — POTASSIUM CHLORIDE 20 MEQ/1
40 TABLET, EXTENDED RELEASE ORAL PRN
Status: DISCONTINUED | OUTPATIENT
Start: 2020-07-21 | End: 2020-07-22 | Stop reason: HOSPADM

## 2020-07-21 RX ORDER — POLYETHYLENE GLYCOL 3350 17 G/17G
17 POWDER, FOR SOLUTION ORAL DAILY PRN
Status: DISCONTINUED | OUTPATIENT
Start: 2020-07-21 | End: 2020-07-22 | Stop reason: HOSPADM

## 2020-07-21 RX ORDER — LORAZEPAM 1 MG/1
3 TABLET ORAL
Status: DISCONTINUED | OUTPATIENT
Start: 2020-07-21 | End: 2020-07-22 | Stop reason: HOSPADM

## 2020-07-21 RX ORDER — ONDANSETRON 4 MG/1
4 TABLET, FILM COATED ORAL EVERY 8 HOURS PRN
Status: DISCONTINUED | OUTPATIENT
Start: 2020-07-21 | End: 2020-07-22 | Stop reason: HOSPADM

## 2020-07-21 RX ORDER — QUETIAPINE FUMARATE 100 MG/1
300 TABLET, FILM COATED ORAL NIGHTLY
Status: DISCONTINUED | OUTPATIENT
Start: 2020-07-21 | End: 2020-07-22 | Stop reason: HOSPADM

## 2020-07-21 RX ORDER — PANTOPRAZOLE SODIUM 40 MG/10ML
INJECTION, POWDER, LYOPHILIZED, FOR SOLUTION INTRAVENOUS
Status: COMPLETED
Start: 2020-07-21 | End: 2020-07-21

## 2020-07-21 RX ORDER — POTASSIUM CHLORIDE 7.45 MG/ML
10 INJECTION INTRAVENOUS PRN
Status: DISCONTINUED | OUTPATIENT
Start: 2020-07-21 | End: 2020-07-22 | Stop reason: HOSPADM

## 2020-07-21 RX ORDER — SODIUM CHLORIDE 0.9 % (FLUSH) 0.9 %
10 SYRINGE (ML) INJECTION PRN
Status: DISCONTINUED | OUTPATIENT
Start: 2020-07-21 | End: 2020-07-22 | Stop reason: HOSPADM

## 2020-07-21 RX ORDER — ONDANSETRON 2 MG/ML
4 INJECTION INTRAMUSCULAR; INTRAVENOUS EVERY 6 HOURS PRN
Status: DISCONTINUED | OUTPATIENT
Start: 2020-07-21 | End: 2020-07-22 | Stop reason: HOSPADM

## 2020-07-21 RX ORDER — LORAZEPAM 2 MG/ML
2 INJECTION INTRAMUSCULAR
Status: DISCONTINUED | OUTPATIENT
Start: 2020-07-21 | End: 2020-07-22 | Stop reason: HOSPADM

## 2020-07-21 RX ORDER — FLUTICASONE PROPIONATE 50 MCG
2 SPRAY, SUSPENSION (ML) NASAL DAILY
Status: DISCONTINUED | OUTPATIENT
Start: 2020-07-21 | End: 2020-07-22 | Stop reason: HOSPADM

## 2020-07-21 RX ORDER — MIDODRINE HYDROCHLORIDE 5 MG/1
5 TABLET ORAL
Status: DISCONTINUED | OUTPATIENT
Start: 2020-07-22 | End: 2020-07-22 | Stop reason: HOSPADM

## 2020-07-21 RX ORDER — TRAZODONE HYDROCHLORIDE 50 MG/1
50 TABLET ORAL NIGHTLY PRN
Status: DISCONTINUED | OUTPATIENT
Start: 2020-07-21 | End: 2020-07-22 | Stop reason: HOSPADM

## 2020-07-21 RX ADMIN — POTASSIUM CHLORIDE, DEXTROSE MONOHYDRATE AND SODIUM CHLORIDE: 150; 5; 450 INJECTION, SOLUTION INTRAVENOUS at 20:34

## 2020-07-21 RX ADMIN — PANTOPRAZOLE SODIUM 40 MG: 40 INJECTION, POWDER, LYOPHILIZED, FOR SOLUTION INTRAVENOUS at 19:11

## 2020-07-21 RX ADMIN — POTASSIUM CHLORIDE AND SODIUM CHLORIDE: 900; 150 INJECTION, SOLUTION INTRAVENOUS at 17:28

## 2020-07-21 RX ADMIN — ENOXAPARIN SODIUM 40 MG: 40 INJECTION SUBCUTANEOUS at 20:24

## 2020-07-21 RX ADMIN — QUETIAPINE FUMARATE 300 MG: 100 TABLET ORAL at 20:24

## 2020-07-21 RX ADMIN — LORAZEPAM 1 MG: 2 INJECTION INTRAMUSCULAR; INTRAVENOUS at 16:44

## 2020-07-21 RX ADMIN — LORAZEPAM 1 MG: 2 INJECTION INTRAMUSCULAR; INTRAVENOUS at 19:40

## 2020-07-21 RX ADMIN — Medication 10 ML: at 19:11

## 2020-07-21 RX ADMIN — PANTOPRAZOLE SODIUM 40 MG: 40 INJECTION, POWDER, FOR SOLUTION INTRAVENOUS at 19:11

## 2020-07-21 RX ADMIN — SODIUM CHLORIDE 1000 ML: 9 INJECTION, SOLUTION INTRAVENOUS at 16:44

## 2020-07-21 RX ADMIN — ONDANSETRON 4 MG: 2 INJECTION INTRAMUSCULAR; INTRAVENOUS at 16:44

## 2020-07-21 ASSESSMENT — PAIN SCALES - GENERAL: PAINLEVEL_OUTOF10: 0

## 2020-07-21 ASSESSMENT — PATIENT HEALTH QUESTIONNAIRE - PHQ9: SUM OF ALL RESPONSES TO PHQ QUESTIONS 1-9: 20

## 2020-07-21 NOTE — ED PROVIDER NOTES
Genitourinary: Negative for decreased urine volume and difficulty urinating. Musculoskeletal: Negative for arthralgias and myalgias. Skin: Negative for color change and rash. Neurological: Positive for tremors. Negative for dizziness, weakness and headaches. Psychiatric/Behavioral: Negative for behavioral problems and confusion. The patient is nervous/anxious. Except as noted above the remainder of the review of systems was reviewed and negative. PAST MEDICAL HISTORY     Past Medical History:   Diagnosis Date    Alcohol abuse 6/5/2014    GI bleed 6/5/2014    Hematemesis 6/5/2014    Hypertension     Per patient BP only increases when he drinks alcohol.     Intractable vomiting 0/2/2331    Metabolic acidosis, increased anion gap 5/29/2019    Suicidal behavior 12/13/2018       SURGICAL HISTORY       Past Surgical History:   Procedure Laterality Date    HERNIA REPAIR      left    HERNIA REPAIR Right 5/16/2019    XI ROBOTIC LAPAROSCOPIC  INGUINAL HERNIA REPAIR WITH MESH, POSSIBLE BILATERAL, POSSIBLE OPEN performed by [place] IV, DO at [address] [place] [address]  6-5-14    gastritis, esophagitis, ulcers-koko    UPPER GASTROINTESTINAL ENDOSCOPY N/A 5/30/2019    EGD BIOPSY performed by Anthony Espinal MD at [address] [place] [address] 12/5/2019    EGD performed by Sherif Fang MD at [address] [place] [address]       Discharge Medication List as of 7/22/2020  2:31 PM      CONTINUE these medications which have NOT CHANGED    Details   fluticasone (FLONASE) 50 MCG/ACT nasal spray 2 sprays by Nasal route daily, Disp-1 Bottle, R-0Normal      ondansetron (ZOFRAN) 4 MG tablet Take 1 tablet by mouth every 8 hours as needed for Nausea or Vomiting, Disp-30 tablet, [id number]      folic acid (FOLVITE) 1 MG tablet Take 1 tablet by mouth daily, Disp-30 tablet, R-3Normal      midodrine (PROAMATINE) 5 MG tablet Take 1 tablet by mouth 3 times daily (with meals) Hold if systolic blood pressure is more than 110, Disp-90 tablet, R-3Normal      vitamin B-1 100 MG tablet Take 1 tablet by mouth daily, Disp-30 tablet, R-3Normal      buPROPion (WELLBUTRIN XL) 150 MG extended release tablet Take 150 mg by mouth every morningHistorical Med      Multiple Vitamin (MULTIVITAMIN) tablet Take 1 tablet by mouth daily, R-0OTC      pantoprazole (PROTONIX) 40 MG tablet Take 1 tablet by mouth every morning (before breakfast), Disp-30 tablet, R-5Normal      traZODone (DESYREL) 50 MG tablet Take 1 tablet by mouth nightly as needed for Sleep, Disp-30 tablet, R-0Normal      QUEtiapine (SEROQUEL) 300 MG tablet Take 1 tablet by mouth nightly, Disp-30 tablet, R-0Normal             ALLERGIES     Pcn [penicillins]    FAMILY HISTORY           Adopted: Yes   Problem Relation Age of Onset    Asthma Mother     Crohn's Disease Mother     No Known Problems Father     Other Sister         Fibromyalgia    No Known Problems Brother     No Known Problems Maternal Grandmother     No Known Problems Maternal Grandfather     No Known Problems Paternal Grandmother     No Known Problems Paternal Grandfather     No Known Problems Daughter     Depression Daughter     No Known Problems Son      Family Status   Relation Name Status    Mother      Father      Sister  Alive    Brother  Alive    MGM      MGF      PGM      PGF      [place]  Alive    P[place]illa  Alive    Son  Alive        SOCIAL HISTORY      reports that he has never smoked. He has never used smokeless tobacco. He reports current alcohol use. He reports that he does not use drugs. PHYSICAL EXAM    (up to 7 for level 4, 8 or more for level 5)     ED Triage Vitals [20 1612]   BP Temp Temp Source Pulse Resp SpO2 Height Weight   (!) 151/109 99.7 °F (37.6 °C) Tympanic 97 16 97 % 5' 9\" (1.753 m) 150 lb (68 kg)   Physical Exam  Constitutional:       General: He is in acute distress. Appearance: He is well-developed. He is not ill-appearing, toxic-appearing or diaphoretic. HENT:      Head: Normocephalic and atraumatic. Right Ear: External ear normal.      Left Ear: External ear normal.      Nose: Nose normal.      Mouth/Throat:      Mouth: Mucous membranes are moist.   Eyes:      General:         Right eye: No discharge. Left eye: No discharge. Conjunctiva/sclera: Conjunctivae normal.      Pupils: Pupils are equal, round, and reactive to light. Neck:      Musculoskeletal: Normal range of motion and neck supple. Cardiovascular:      Rate and Rhythm: Normal rate and regular rhythm. Heart sounds: Normal heart sounds. No murmur. Pulmonary:      Effort: Pulmonary effort is normal. No respiratory distress. Breath sounds: Normal breath sounds. No wheezing, rhonchi or rales. Chest:      Chest wall: No tenderness. Abdominal:      General: Bowel sounds are normal. There is no distension. Palpations: Abdomen is soft. There is no mass. Tenderness: There is no abdominal tenderness. There is no guarding or rebound. Musculoskeletal: Normal range of motion. Right lower leg: No edema. Left lower leg: No edema. Lymphadenopathy:      Cervical: No cervical adenopathy. Skin:     General: Skin is warm. Coloration: Skin is not pale. Findings: No rash. Neurological:      General: No focal deficit present. Mental Status: He is alert and oriented to person, place, and time. Cranial Nerves: No cranial nerve deficit. Sensory: No sensory deficit. Motor: Tremor present. No weakness or abnormal muscle tone. Gait: Gait normal.   Psychiatric:         Mood and Affect: Mood is anxious.          Behavior: Behavior normal.         DIAGNOSTIC RESULTS     EKG: All EKG's are interpreted by the Emergency Department Physician who either signs or Co-signs this chart in the absence of a cardiologist.    none    RADIOLOGY:   Non-plain film images such as CT, Ultrasound and MRI are read by the radiologist. Plain radiographic images are visualized and preliminarily interpreted by the emergency physician with the below findings:    Interpretation per the Radiologist below, if available at the time of this note:    No orders to display         ED BEDSIDE ULTRASOUND:   Performed by ED Physician - none    LABS:  Labs Reviewed   CBC WITH AUTO DIFFERENTIAL - Abnormal; Notable for the following components:       Result Value    MCHC 36.0 (*)     Lymphocytes 21 (*)     Monocytes 15 (*)     All other components within normal limits   COMPREHENSIVE METABOLIC PANEL W/ REFLEX TO MG FOR LOW K - Abnormal; Notable for the following components:    Glucose 106 (*)     CREATININE 0.65 (*)     Potassium 3.0 (*)     Chloride 95 (*)     Anion Gap 21 (*)     All other components within normal limits   BASIC METABOLIC PANEL W/ REFLEX TO MG FOR LOW K - Abnormal; Notable for the following components:    Glucose 134 (*)     Bun/Cre Ratio 8 (*)     Calcium 7.8 (*)     Potassium 3.4 (*)     All other components within normal limits   CBC - Abnormal; Notable for the following components:    RBC 3.88 (*)     Hemoglobin 12.9 (*)     Hematocrit 37.0 (*)     MCHC 34.9 (*)     All other components within normal limits   BASIC METABOLIC PANEL - Abnormal; Notable for the following components:    Glucose 101 (*)     CREATININE 0.60 (*)     Calcium 7.7 (*)     Potassium 3.4 (*)     All other components within normal limits   BASIC METABOLIC PANEL - Abnormal; Notable for the following components:    Glucose 118 (*)     Bun/Cre Ratio 8 (*)     Calcium 8.0 (*)     Potassium 3.4 (*)     All other components within normal limits   BASIC METABOLIC PANEL - Abnormal; Notable for the following components:    Glucose 116 (*)     BUN 4 (*)     Bun/Cre Ratio 5 (*)     Calcium 7.9 (*)     All other components within normal limits   LIPASE   AMYLASE   MAGNESIUM   PROTIME-INR   MAGNESIUM   ETHANOL

## 2020-07-22 VITALS
OXYGEN SATURATION: 99 % | HEART RATE: 81 BPM | HEIGHT: 69 IN | TEMPERATURE: 97 F | DIASTOLIC BLOOD PRESSURE: 69 MMHG | RESPIRATION RATE: 18 BRPM | WEIGHT: 159.8 LBS | BODY MASS INDEX: 23.67 KG/M2 | SYSTOLIC BLOOD PRESSURE: 111 MMHG

## 2020-07-22 LAB
ANION GAP SERPL CALCULATED.3IONS-SCNC: 10 MMOL/L (ref 9–17)
ANION GAP SERPL CALCULATED.3IONS-SCNC: 11 MMOL/L (ref 9–17)
ANION GAP SERPL CALCULATED.3IONS-SCNC: 14 MMOL/L (ref 9–17)
BUN BLDV-MCNC: 4 MG/DL (ref 6–20)
BUN BLDV-MCNC: 6 MG/DL (ref 6–20)
BUN BLDV-MCNC: 7 MG/DL (ref 6–20)
BUN/CREAT BLD: 5 (ref 9–20)
BUN/CREAT BLD: 8 (ref 9–20)
BUN/CREAT BLD: 8 (ref 9–20)
CALCIUM SERPL-MCNC: 7.8 MG/DL (ref 8.6–10.4)
CALCIUM SERPL-MCNC: 7.9 MG/DL (ref 8.6–10.4)
CALCIUM SERPL-MCNC: 8 MG/DL (ref 8.6–10.4)
CHLORIDE BLD-SCNC: 104 MMOL/L (ref 98–107)
CHLORIDE BLD-SCNC: 104 MMOL/L (ref 98–107)
CHLORIDE BLD-SCNC: 106 MMOL/L (ref 98–107)
CO2: 22 MMOL/L (ref 20–31)
CO2: 22 MMOL/L (ref 20–31)
CO2: 25 MMOL/L (ref 20–31)
CREAT SERPL-MCNC: 0.73 MG/DL (ref 0.7–1.2)
CREAT SERPL-MCNC: 0.76 MG/DL (ref 0.7–1.2)
CREAT SERPL-MCNC: 0.84 MG/DL (ref 0.7–1.2)
ETHANOL PERCENT: NORMAL %
ETHANOL: <10 MG/DL
GFR AFRICAN AMERICAN: >60 ML/MIN
GFR NON-AFRICAN AMERICAN: >60 ML/MIN
GFR SERPL CREATININE-BSD FRML MDRD: ABNORMAL ML/MIN/{1.73_M2}
GLUCOSE BLD-MCNC: 116 MG/DL (ref 70–99)
GLUCOSE BLD-MCNC: 118 MG/DL (ref 70–99)
GLUCOSE BLD-MCNC: 134 MG/DL (ref 70–99)
HCT VFR BLD CALC: 37 % (ref 40.7–50.3)
HEMOGLOBIN: 12.9 G/DL (ref 13–17)
INR BLD: 1.1
MAGNESIUM: 1.9 MG/DL (ref 1.6–2.6)
MCH RBC QN AUTO: 33.2 PG (ref 25.2–33.5)
MCHC RBC AUTO-ENTMCNC: 34.9 G/DL (ref 25.2–33.5)
MCV RBC AUTO: 95.4 FL (ref 82.6–102.9)
NRBC AUTOMATED: 0 PER 100 WBC
PDW BLD-RTO: 13.3 % (ref 11.8–14.4)
PLATELET # BLD: 173 K/UL (ref 138–453)
PMV BLD AUTO: 9.5 FL (ref 8.1–13.5)
POTASSIUM SERPL-SCNC: 3.4 MMOL/L (ref 3.7–5.3)
POTASSIUM SERPL-SCNC: 3.4 MMOL/L (ref 3.7–5.3)
POTASSIUM SERPL-SCNC: 3.8 MMOL/L (ref 3.7–5.3)
PROTHROMBIN TIME: 13.3 SEC (ref 11.5–14.2)
RBC # BLD: 3.88 M/UL (ref 4.21–5.77)
SODIUM BLD-SCNC: 139 MMOL/L (ref 135–144)
SODIUM BLD-SCNC: 139 MMOL/L (ref 135–144)
SODIUM BLD-SCNC: 140 MMOL/L (ref 135–144)
WBC # BLD: 3.8 K/UL (ref 3.5–11.3)

## 2020-07-22 PROCEDURE — 2500000003 HC RX 250 WO HCPCS: Performed by: NURSE PRACTITIONER

## 2020-07-22 PROCEDURE — 36415 COLL VENOUS BLD VENIPUNCTURE: CPT

## 2020-07-22 PROCEDURE — 99238 HOSP IP/OBS DSCHRG MGMT 30/<: CPT | Performed by: INTERNAL MEDICINE

## 2020-07-22 PROCEDURE — 83735 ASSAY OF MAGNESIUM: CPT

## 2020-07-22 PROCEDURE — 6360000002 HC RX W HCPCS: Performed by: NURSE PRACTITIONER

## 2020-07-22 PROCEDURE — 6370000000 HC RX 637 (ALT 250 FOR IP): Performed by: NURSE PRACTITIONER

## 2020-07-22 PROCEDURE — 6370000000 HC RX 637 (ALT 250 FOR IP): Performed by: INTERNAL MEDICINE

## 2020-07-22 PROCEDURE — G0480 DRUG TEST DEF 1-7 CLASSES: HCPCS

## 2020-07-22 PROCEDURE — 85027 COMPLETE CBC AUTOMATED: CPT

## 2020-07-22 PROCEDURE — 80048 BASIC METABOLIC PNL TOTAL CA: CPT

## 2020-07-22 PROCEDURE — 85610 PROTHROMBIN TIME: CPT

## 2020-07-22 RX ORDER — NICOTINE 21 MG/24HR
1 PATCH, TRANSDERMAL 24 HOURS TRANSDERMAL DAILY PRN
Qty: 30 PATCH | Refills: 0 | COMMUNITY
Start: 2020-07-22

## 2020-07-22 RX ORDER — POTASSIUM CHLORIDE 20 MEQ/1
40 TABLET, EXTENDED RELEASE ORAL ONCE
Status: COMPLETED | OUTPATIENT
Start: 2020-07-22 | End: 2020-07-22

## 2020-07-22 RX ORDER — POTASSIUM CHLORIDE 20 MEQ/1
40 TABLET, EXTENDED RELEASE ORAL ONCE
Status: DISCONTINUED | OUTPATIENT
Start: 2020-07-22 | End: 2020-07-22 | Stop reason: HOSPADM

## 2020-07-22 RX ADMIN — POTASSIUM CHLORIDE, DEXTROSE MONOHYDRATE AND SODIUM CHLORIDE: 150; 5; 450 INJECTION, SOLUTION INTRAVENOUS at 09:29

## 2020-07-22 RX ADMIN — POTASSIUM CHLORIDE 40 MEQ: 20 TABLET, EXTENDED RELEASE ORAL at 14:41

## 2020-07-22 RX ADMIN — LORAZEPAM 4 MG: 2 INJECTION INTRAMUSCULAR; INTRAVENOUS at 02:08

## 2020-07-22 RX ADMIN — POTASSIUM CHLORIDE 40 MEQ: 20 TABLET, EXTENDED RELEASE ORAL at 09:23

## 2020-07-22 RX ADMIN — Medication 100 MG: at 09:23

## 2020-07-22 RX ADMIN — LORAZEPAM 2 MG: 2 INJECTION INTRAMUSCULAR; INTRAVENOUS at 01:03

## 2020-07-22 RX ADMIN — MIDODRINE HYDROCHLORIDE 5 MG: 5 TABLET ORAL at 09:22

## 2020-07-22 RX ADMIN — THERA TABS 1 TABLET: TAB at 09:22

## 2020-07-22 RX ADMIN — POTASSIUM CHLORIDE, DEXTROSE MONOHYDRATE AND SODIUM CHLORIDE: 150; 5; 450 INJECTION, SOLUTION INTRAVENOUS at 09:23

## 2020-07-22 RX ADMIN — BUPROPION HYDROCHLORIDE 150 MG: 150 TABLET, EXTENDED RELEASE ORAL at 09:23

## 2020-07-22 RX ADMIN — FOLIC ACID 1 MG: 1 TABLET ORAL at 09:23

## 2020-07-22 RX ADMIN — PANTOPRAZOLE SODIUM 40 MG: 40 TABLET, DELAYED RELEASE ORAL at 09:29

## 2020-07-22 RX ADMIN — LORAZEPAM 4 MG: 2 INJECTION INTRAMUSCULAR; INTRAVENOUS at 03:13

## 2020-07-22 RX ADMIN — POTASSIUM CHLORIDE, DEXTROSE MONOHYDRATE AND SODIUM CHLORIDE: 150; 5; 450 INJECTION, SOLUTION INTRAVENOUS at 00:47

## 2020-07-22 RX ADMIN — ENOXAPARIN SODIUM 40 MG: 40 INJECTION SUBCUTANEOUS at 09:22

## 2020-07-22 ASSESSMENT — PAIN SCALES - GENERAL
PAINLEVEL_OUTOF10: 0
PAINLEVEL_OUTOF10: 0

## 2020-07-22 NOTE — H&P
HOSPITALIST ADMISSION H&P      REASON FOR ADMISSION:    Hypokalemia----alcoholism  ESTIMATED LENGTH OF STAY:  < 2 midnights---1-2 days    ATTENDING/ADMITTING PHYSICIAN: Jodie Hsu MD  PCP: . None (Inactive)    HISTORY OF PRESENT ILLNESS:      The patient is a 45 y.o. male patient of . None (Inactive) who presents with known alcoholism--plans on entering [facility] for ETOH rehab--7.24.2020. In ER--7.21.2020---K = 3.0---patient had some nausea--vomiting ---> potassium replacement    BAL---7.22.2020 < 10---no withdrawal signs symptoms----last ETOH 1/5 vodka on 7.22.2020           See below for additional PMH. Patient shcz-kxflnnojza-zorzitez-available records reviewed, including, but not limited to,  ER reports--labs--imaging--office records---personal notes       Past Medical History:   Diagnosis Date    Alcohol abuse 6/5/2014    GI bleed 6/5/2014    Hematemesis 6/5/2014    Hypertension     Per patient BP only increases when he drinks alcohol.     Intractable vomiting 1/7/1661    Metabolic acidosis, increased anion gap 5/29/2019    Suicidal behavior 12/13/2018           Past Surgical History:   Procedure Laterality Date    HERNIA REPAIR      left    HERNIA REPAIR Right 5/16/2019    XI ROBOTIC LAPAROSCOPIC  INGUINAL HERNIA REPAIR WITH MESH, POSSIBLE BILATERAL, POSSIBLE OPEN performed by Jovon Hills IV, DO at [address]  6-5-14    gastritis, esophagitis, ulcers-koko    UPPER GASTROINTESTINAL ENDOSCOPY N/A 5/30/2019    EGD BIOPSY performed by Kishore Howard MD at [address] 12/5/2019    EGD performed by Jeff Perez MD at [facility]       Medications Prior to Admission:    Medications Prior to Admission: fluticasone (FLONASE) 50 MCG/ACT nasal spray, 2 sprays by Nasal route daily (Patient not taking: Reported on 12/17/2019)  ondansetron (ZOFRAN) 4 MG tablet, Take 1 tablet by mouth every 8 hours as needed for Nausea or Vomiting (Patient not taking: Reported on 89/72/2389)  folic acid (FOLVITE) 1 MG tablet, Take 1 tablet by mouth daily (Patient not taking: Reported on 12/17/2019)  midodrine (PROAMATINE) 5 MG tablet, Take 1 tablet by mouth 3 times daily (with meals) Hold if systolic blood pressure is more than 110 (Patient not taking: Reported on 12/17/2019)  vitamin B-1 100 MG tablet, Take 1 tablet by mouth daily (Patient not taking: Reported on 12/17/2019)  buPROPion (WELLBUTRIN XL) 150 MG extended release tablet, Take 150 mg by mouth every morning  Multiple Vitamin (MULTIVITAMIN) tablet, Take 1 tablet by mouth daily (Patient not taking: Reported on 12/17/2019)  pantoprazole (PROTONIX) 40 MG tablet, Take 1 tablet by mouth every morning (before breakfast) (Patient not taking: Reported on 12/17/2019)  traZODone (DESYREL) 50 MG tablet, Take 1 tablet by mouth nightly as needed for Sleep  QUEtiapine (SEROQUEL) 300 MG tablet, Take 1 tablet by mouth nightly    Allergies:    Pcn [penicillins]    Social History:    reports that he has never smoked. He has never used smokeless tobacco. He reports current alcohol use. He reports that he does not use drugs. Family History:   family history includes Asthma in his mother; Crohn's Disease in his mother; Depression in his daughter; No Known Problems in his brother, daughter, father, maternal grandfather, maternal grandmother, paternal grandfather, paternal grandmother, and son; Other in his sister. He was adopted. REVIEW OF SYSTEMS:  See HPI and problem list; otherwise no other new complaints with respect to HEENT, neck, pulmonary, coronary, GI, , endocrine, musculoskeletal, immune system/connective tissue disease, hematologic, neuropsych, skin, lymphatics, or malignancies.      PHYSICAL EXAM:  Vitals:  /69   Pulse 81   Temp 97 °F (36.1 °C) (Tympanic)   Resp 18   Ht 5' 9\" (1.753 m)   Wt 159 lb 12.8 oz (72.5 kg)   SpO2 99%   BMI 23.60 kg/m²     HEENT: Normocephalic and Atraumatic  Neck: Supple, No Masses, Tenderness, Nodularity and No Lymphadenopathy  Chest/Lungs: Clear to Auscultation without Rales, Rhonchi, or Wheezes  Cardiac: Regular Rate and Rhythm without Rubs, Clicks, Gallops, or Murmurs  GI/Abdomen:  Bowel Sounds Present and Soft, Non-tender, without Guarding or Rebound Tenderness  : Not examined  EXT/Skin: No Edema, No Cyanosis and No Clubbing  Neuro: Alert and Oriented and No Localizing Signs/Symptoms        LABS:    CBC with Differential:    Lab Results   Component Value Date    WBC 3.8 07/22/2020    RBC 3.88 07/22/2020    HGB 12.9 07/22/2020    HCT 37.0 07/22/2020     07/22/2020    MCV 95.4 07/22/2020    MCH 33.2 07/22/2020    MCHC 34.9 07/22/2020    RDW 13.3 07/22/2020    LYMPHOPCT 21 07/21/2020    MONOPCT 15 07/21/2020    BASOPCT 1 07/21/2020    MONOSABS 0.81 07/21/2020    LYMPHSABS 1.17 07/21/2020    EOSABS 0.03 07/21/2020    BASOSABS 0.03 07/21/2020    DIFFTYPE NOT REPORTED 07/21/2020     BMP:    Lab Results   Component Value Date     07/22/2020    K 3.8 07/22/2020     07/22/2020    CO2 25 07/22/2020    BUN 4 07/22/2020    LABALBU 4.2 07/21/2020    CREATININE 0.73 07/22/2020    CALCIUM 7.9 07/22/2020    GFRAA >60 07/22/2020    LABGLOM >60 07/22/2020    GLUCOSE 116 07/22/2020     Potassium:    Lab Results   Component Value Date    K 3.8 07/22/2020       ASSESSMENT:      Patient Active Problem List   Diagnosis    Hematemesis    Alcohol abuse    Esophagitis    Intractable nausea and vomiting    Anxiety    Essential hypertension    Coffee ground emesis    Severe major depression without psychotic features ([address]    History of esophagitis    Major depressive disorder, recurrent (HCC)    Severe major depression (HCC)    Alcohol withdrawal syndrome without complication ([address]    Hepatic steatosis    Acute superficial gastritis without hemorrhage    Duodenitis    Moderate malnutrition (HCC)    Upper GI bleed    Hypokalemia     ROSA ZENG 45  WM  [[facility];  Josiah Kim, ]  FULL CODE    NO ANTICOAGULATION    CIWA--Ar    Hypokalemia---7.21.2020      Alcoholism           Alcohol intoxication----5.20.2020           Brief abstinence--then resumed ETOH abuse--2020           Prior alcohol withdrawal syndrome           Alcohol intoxication---binge  Hypertension             2D ECHO----6.17.2019---NLVSF--NRVSF---trace TR--RVSP ~ 23 mm Hg---                              normal aortic root dimension---IVC small but normal inspiratory                              collapse--LVEF ~ 55-60%  Depression---anxiety   Hepatic steatosis  CKD---Stage 2  Prior:          Hematemesis---5.20.2020            EGD----12. 5.2019---small HH--no bleeding or evidence of bleeding--small pylorus          GI bleed---12.4.2019--nausea----hematemesis          Prior GI bleed---alcohol abuse---gastritis---duodenitis--esophagitis--                          intractable nausea-vomiting           CT abdomen-pelvis--12.4.2019---mild fat-stranding along cecum without                         thickening--normal appendix---small amount fluid deep pelvis---                         small HH----sigmoid diverticulosis  PMH:   see above, low serum aldosterone, suicidal ideation--12.4.2019--then denied,               seizure with alcohol withdrawal at least once   PSH:    EGD---5.30.2019, RIH--mesh--5.16.2019, EGD---2014, LI    Allergies:  penicillin---rash     Code Status:   FULL CODE  OARRS---7.22.2020---[-]    PLAN:    1. Potassium replacement  2. ETOH---CIWA-Ar---withdrawal regimen  3. Home medications reviewed  4.   See orders     Note that over 50 minutes was spent in evaluation of the patient, review of the chart and pertinent records, discussion with family/staff, etc    Jodie Hsu MD  1:32 PM  7/22/2020     Add:  Potassium corrected           Magnesium normal           No withdrawal signs---symptoms           Home---7.22.2020           [facility] rehab---7.24.2020           See orders

## 2020-07-22 NOTE — PLAN OF CARE
Problem: Safety:  Goal: Free from accidental physical injury  Description: Free from accidental physical injury  7/22/2020 1421 by Kiana Alvarez RN  Outcome: Completed  7/22/2020 0710 by Kiana Alvarez RN  Outcome: Ongoing  Goal: Free from intentional harm  Description: Free from intentional harm  7/22/2020 1421 by Kiana Alvarez RN  Outcome: Completed  7/22/2020 0710 by Kiana Alvarez RN  Outcome: Ongoing     Problem: Daily Care:  Goal: Daily care needs are met  Description: Daily care needs are met  7/22/2020 1421 by Kiana Alvarez RN  Outcome: Completed  7/22/2020 0710 by Kiana Alvarez RN  Outcome: Ongoing     Problem: Discharge Planning:  Goal: Discharged to appropriate level of care  Description: Discharged to appropriate level of care  7/22/2020 1421 by Kiana Alvarez RN  Outcome: Completed  7/22/2020 0710 by Kiana Alvarez RN  Outcome: Ongoing     Problem: Sleep Pattern Disturbance:  Goal: Appears well-rested  Description: Appears well-rested  7/22/2020 1421 by Kiana Alvarez RN  Outcome: Completed  7/22/2020 0710 by Kiana Alvarez RN  Outcome: Ongoing     Problem: Violence - Risk of, Self/Other-Directed:  Goal: Knowledge of developmental care interventions  Description: Absence of violence  7/22/2020 1421 by Kiana Alvarez RN  Outcome: Completed  7/22/2020 0710 by Kiana Alvarez RN  Outcome: Ongoing

## 2020-07-22 NOTE — PLAN OF CARE
Problem: Safety:  Goal: Free from accidental physical injury  Description: Free from accidental physical injury  7/22/2020 0710 by Chidi Flores RN  Outcome: Ongoing  7/21/2020 2040 by Emilee Nichols RN  Outcome: Ongoing  Goal: Free from intentional harm  Description: Free from intentional harm  7/22/2020 0710 by Chidi Flores RN  Outcome: Ongoing  7/21/2020 2040 by Emilee Nichols RN  Outcome: Ongoing     Problem: Daily Care:  Goal: Daily care needs are met  Description: Daily care needs are met  7/22/2020 0710 by Chidi Flores RN  Outcome: Ongoing  7/21/2020 2040 by Emilee Nichols RN  Outcome: Ongoing     Problem: Discharge Planning:  Goal: Discharged to appropriate level of care  Description: Discharged to appropriate level of care  7/22/2020 0710 by Chidi Flores RN  Outcome: Ongoing  7/21/2020 2040 by Emilee Nichols RN  Outcome: Ongoing     Problem: Sleep Pattern Disturbance:  Goal: Appears well-rested  Description: Appears well-rested  7/22/2020 0710 by Chidi Flores RN  Outcome: Ongoing  7/21/2020 2040 by Emilee Nichols RN  Outcome: Ongoing     Problem: Violence - Risk of, Self/Other-Directed:  Goal: Knowledge of developmental care interventions  Description: Absence of violence  7/22/2020 0710 by Chidi Flores RN  Outcome: Ongoing  7/21/2020 2040 by Emilee Nichols RN  Outcome: Ongoing

## 2020-07-22 NOTE — PROGRESS NOTES
SW consulted. SW attempted to complete Psychosocial Assessment on patient. Patient asleep at this time. SW discussed with nurse. SW will continue to monitor needs and assist as appropriate.           DANIELE Feng  7/22/2020

## 2020-07-23 ASSESSMENT — ENCOUNTER SYMPTOMS
CONSTIPATION: 0
SHORTNESS OF BREATH: 0
VOMITING: 1
NAUSEA: 1
EYE PAIN: 0
DIARRHEA: 0
WHEEZING: 0
STRIDOR: 0
EYE REDNESS: 0
COLOR CHANGE: 0
EYE DISCHARGE: 0
ABDOMINAL PAIN: 1
SORE THROAT: 0
COUGH: 0

## 2020-07-23 NOTE — DISCHARGE SUMMARY
[address]                 [address] [place] [address], [address] [place] [address]                               DISCHARGE SUMMARY    PATIENT NAME: Jose ZENG                     :        1970  MED REC NO:   [id number]                             ROOM:       8998  ACCOUNT NO:   [de-identified]                           ADMIT DATE: 2020  PROVIDER:     Lara Hogdes. Justo Pandey MD                  DISCHARGE DATE:  2020    ATTENDING PHYSICIAN OF HOSPITALIZATION:  Graciela Espinoza MD    The patient has been previously [facility]. DIAGNOSES:  1. Hypokalemia, 2020.  2.  Alcoholism with most recent use, 2020, one-fifth of vodka. 3.  Prior history of alcohol intoxication. The patient's prior alcohol  withdrawal syndrome, often binge pattern. The patient is to be seen in  [facility], 2020.  4.  Depression, anxiety. 5.  Hepatic steatosis, chronic. 6.  Chronic kidney disease, stage 2. Other medical problems set forth in the progress note of 2020  incorporated for reference herein. HOSPITAL COURSE:  A 59-year-old white male patient, [facility]. The  patient presented with complaints of  alcoholism; found to have a  low potassium, apparently around 3.0. The patient had stated his last  drink was a fifth of vodka, which he apparently consumes on a daily  Basis. Cessation--abstinence advised---to enter [facility] for rehab. The patient's potassium was corrected during this hospitalization. The  patient back to baseline, able to be discharged and will follow up with  [facility] for alcohol treatment on 2020. Hypertension,  variable. Blood pressure is actually controlled, 98/66. potassium was up to 3.8, was given additional potassium around the time  of discharge. Magnesium = 1.9. LABORATORY DATA:  Around the time of discharge, white cell count 3.8,  hemoglobin 12.9, hematocrit 37.0 and platelets 235,174.   Sodium 140,  potassium 3.8, chloride 104, CO2 of 22, BUN 6, creatinine of 0.7,  glucose 134, calcium 7.8 and his GFR greater than 60. The patient at this time denies tobacco, drugs, marijuana use or vaping. DISCHARGE INSTRUCTIONS/FOLLOWUP:  Home, 07/22/2020. DIET:  General.    ACTIVITY:  As tolerated. CONDITION AT DISCHARGE:  Fair. , improved. MEDICATIONS, NEW:  None. CONTINUE THE FOLLOWING MEDICATIONS:  Wellbutrin XL (bupropion HCL) 150  mg p.o. q. p.m.; fluticasone (Flonase) 50 mcg two sprays each nostril  daily; folic acid 1 mg p.o. daily; midodrine (ProAmatine) 5 mg three  times a day, hold for systolic pressure less than 110. Multivitamin one  daily. Ondansetron (Zofran) 4 mg p.o. q.8 hours p.r.n. nausea,  vomiting. Pantoprazole (Protonix) 40 mg p.o. daily. Quetiapine  (Seroquel) 300 mg p.o. nightly; thiamine 100 mg p.o. daily and trazodone  (Desyrel) 50 mg p.o. nightly p.r.n. sleep. Followup, [facility]. The patient to be seen by [facility],  07/24/2020. Any aspect of the patient's care not discussed in the chart and/or  dictation will be addressed and treated as an outpatient. The patient's medications have been reviewed including, but not limited  to, pre-hospital, hospital and discharge medications. The patient  and/or the patient's personal representatives were specifically advised  the only medications to be taken are those set forth in the discharge  orders and no other medications should be taken. Any prior medications  not on the discharge orders are specifically discontinued.         Varun Cruz MD    D: 07/22/2020 18:08:16       T: 07/23/2020 15:07:38     [email or web address]  Job#: [id number]     Doc#: [id number]    CC:

## 2020-08-13 ENCOUNTER — HOSPITAL ENCOUNTER (INPATIENT)
Age: 50
LOS: 1 days | Discharge: HOME | DRG: 242 | End: 2020-08-14
Attending: EMERGENCY MEDICINE | Admitting: INTERNAL MEDICINE
Payer: COMMERCIAL

## 2020-08-13 ENCOUNTER — ANESTHESIA EVENT (OUTPATIENT)
Dept: ENDOSCOPY | Age: 50
DRG: 242 | End: 2020-08-13
Payer: COMMERCIAL

## 2020-08-13 ENCOUNTER — APPOINTMENT (OUTPATIENT)
Dept: GENERAL RADIOLOGY | Age: 50
DRG: 242 | End: 2020-08-13
Payer: COMMERCIAL

## 2020-08-13 PROBLEM — K29.80 DUODENITIS: Status: RESOLVED | Noted: 2019-05-30 | Resolved: 2020-08-13

## 2020-08-13 PROBLEM — F10.930 ALCOHOL WITHDRAWAL SYNDROME WITHOUT COMPLICATION (HCC): Status: RESOLVED | Noted: 2019-05-28 | Resolved: 2020-08-13

## 2020-08-13 PROBLEM — Z87.19 HISTORY OF ESOPHAGITIS: Status: RESOLVED | Noted: 2019-02-19 | Resolved: 2020-08-13

## 2020-08-13 PROBLEM — K29.00 ACUTE SUPERFICIAL GASTRITIS WITHOUT HEMORRHAGE: Status: RESOLVED | Noted: 2019-05-30 | Resolved: 2020-08-13

## 2020-08-13 PROBLEM — K92.2 UPPER GI BLEED: Status: RESOLVED | Noted: 2019-12-04 | Resolved: 2020-08-13

## 2020-08-13 PROBLEM — K76.0 HEPATIC STEATOSIS: Status: RESOLVED | Noted: 2019-05-29 | Resolved: 2020-08-13

## 2020-08-13 PROBLEM — F32.2 SEVERE MAJOR DEPRESSION (HCC): Status: RESOLVED | Noted: 2019-04-10 | Resolved: 2020-08-13

## 2020-08-13 PROBLEM — E87.6 HYPOKALEMIA: Status: RESOLVED | Noted: 2020-07-21 | Resolved: 2020-08-13

## 2020-08-13 PROBLEM — F10.930 ALCOHOL WITHDRAWAL SYNDROME, UNCOMPLICATED (HCC): Status: RESOLVED | Noted: 2019-06-13 | Resolved: 2020-08-13

## 2020-08-13 PROBLEM — E44.0 MODERATE MALNUTRITION (HCC): Status: RESOLVED | Noted: 2019-06-13 | Resolved: 2020-08-13

## 2020-08-13 LAB
ABSOLUTE EOS #: 0.06 K/UL (ref 0–0.44)
ABSOLUTE IMMATURE GRANULOCYTE: 0.05 K/UL (ref 0–0.3)
ABSOLUTE LYMPH #: 0.95 K/UL (ref 1.1–3.7)
ABSOLUTE MONO #: 0.43 K/UL (ref 0.1–1.2)
ACETAMINOPHEN LEVEL: <5 UG/ML (ref 10–30)
ALBUMIN SERPL-MCNC: 4.5 G/DL (ref 3.5–5.2)
ALBUMIN/GLOBULIN RATIO: 1.5 (ref 1–2.5)
ALP BLD-CCNC: 59 U/L (ref 40–129)
ALT SERPL-CCNC: 18 U/L (ref 5–41)
ANION GAP SERPL CALCULATED.3IONS-SCNC: 21 MMOL/L (ref 9–17)
AST SERPL-CCNC: 22 U/L
BASOPHILS # BLD: 1 % (ref 0–2)
BASOPHILS ABSOLUTE: 0.05 K/UL (ref 0–0.2)
BILIRUB SERPL-MCNC: 0.25 MG/DL (ref 0.3–1.2)
BILIRUBIN DIRECT: <0.08 MG/DL
BILIRUBIN, INDIRECT: ABNORMAL MG/DL (ref 0–1)
BUN BLDV-MCNC: 19 MG/DL (ref 6–20)
BUN/CREAT BLD: ABNORMAL (ref 9–20)
CALCIUM SERPL-MCNC: 8.7 MG/DL (ref 8.6–10.4)
CHLORIDE BLD-SCNC: 101 MMOL/L (ref 98–107)
CO2: 19 MMOL/L (ref 20–31)
CREAT SERPL-MCNC: 0.6 MG/DL (ref 0.7–1.2)
DIFFERENTIAL TYPE: ABNORMAL
EKG ATRIAL RATE: 93 BPM
EKG P AXIS: 53 DEGREES
EKG P-R INTERVAL: 162 MS
EKG Q-T INTERVAL: 350 MS
EKG QRS DURATION: 94 MS
EKG QTC CALCULATION (BAZETT): 435 MS
EKG R AXIS: 55 DEGREES
EKG T AXIS: 36 DEGREES
EKG VENTRICULAR RATE: 93 BPM
EOSINOPHILS RELATIVE PERCENT: 1 % (ref 1–4)
ETHANOL PERCENT: 0.07 %
ETHANOL PERCENT: <0.01 %
ETHANOL: 70 MG/DL
ETHANOL: <10 MG/DL
GFR AFRICAN AMERICAN: >60 ML/MIN
GFR NON-AFRICAN AMERICAN: >60 ML/MIN
GFR SERPL CREATININE-BSD FRML MDRD: ABNORMAL ML/MIN/{1.73_M2}
GFR SERPL CREATININE-BSD FRML MDRD: ABNORMAL ML/MIN/{1.73_M2}
GLOBULIN: ABNORMAL G/DL (ref 1.5–3.8)
GLUCOSE BLD-MCNC: 109 MG/DL (ref 70–99)
GLUCOSE BLD-MCNC: 95 MG/DL (ref 75–110)
HCT VFR BLD CALC: 40 % (ref 40.7–50.3)
HCT VFR BLD CALC: 45 % (ref 40.7–50.3)
HEMOGLOBIN: 13.1 G/DL (ref 13–17)
HEMOGLOBIN: 14.7 G/DL (ref 13–17)
IMMATURE GRANULOCYTES: 1 %
INR BLD: 0.9
LYMPHOCYTES # BLD: 10 % (ref 24–43)
MAGNESIUM: 2.2 MG/DL (ref 1.6–2.6)
MCH RBC QN AUTO: 32.3 PG (ref 25.2–33.5)
MCHC RBC AUTO-ENTMCNC: 32.7 G/DL (ref 28.4–34.8)
MCV RBC AUTO: 98.9 FL (ref 82.6–102.9)
MONOCYTES # BLD: 4 % (ref 3–12)
NRBC AUTOMATED: 0 PER 100 WBC
PARTIAL THROMBOPLASTIN TIME: 24.2 SEC (ref 20.5–30.5)
PDW BLD-RTO: 13.2 % (ref 11.8–14.4)
PLATELET # BLD: 344 K/UL (ref 138–453)
PLATELET ESTIMATE: ABNORMAL
PMV BLD AUTO: 10.5 FL (ref 8.1–13.5)
POTASSIUM SERPL-SCNC: 4.4 MMOL/L (ref 3.7–5.3)
PROTHROMBIN TIME: 9.5 SEC (ref 9–12)
RBC # BLD: 4.55 M/UL (ref 4.21–5.77)
RBC # BLD: ABNORMAL 10*6/UL
SALICYLATE LEVEL: <1 MG/DL (ref 3–10)
SARS-COV-2, PCR: NORMAL
SARS-COV-2, RAPID: NORMAL
SARS-COV-2: NOT DETECTED
SEG NEUTROPHILS: 83 % (ref 36–65)
SEGMENTED NEUTROPHILS ABSOLUTE COUNT: 8.43 K/UL (ref 1.5–8.1)
SODIUM BLD-SCNC: 141 MMOL/L (ref 135–144)
SOURCE: NORMAL
TOTAL PROTEIN: 7.5 G/DL (ref 6.4–8.3)
TOXIC TRICYCLIC SC,BLOOD: NEGATIVE
TROPONIN INTERP: NORMAL
TROPONIN INTERP: NORMAL
TROPONIN T: NORMAL NG/ML
TROPONIN T: NORMAL NG/ML
TROPONIN, HIGH SENSITIVITY: 6 NG/L (ref 0–22)
TROPONIN, HIGH SENSITIVITY: 7 NG/L (ref 0–22)
WBC # BLD: 10 K/UL (ref 3.5–11.3)
WBC # BLD: ABNORMAL 10*3/UL

## 2020-08-13 PROCEDURE — 85018 HEMOGLOBIN: CPT

## 2020-08-13 PROCEDURE — U0003 INFECTIOUS AGENT DETECTION BY NUCLEIC ACID (DNA OR RNA); SEVERE ACUTE RESPIRATORY SYNDROME CORONAVIRUS 2 (SARS-COV-2) (CORONAVIRUS DISEASE [COVID-19]), AMPLIFIED PROBE TECHNIQUE, MAKING USE OF HIGH THROUGHPUT TECHNOLOGIES AS DESCRIBED BY CMS-2020-01-R: HCPCS

## 2020-08-13 PROCEDURE — C9113 INJ PANTOPRAZOLE SODIUM, VIA: HCPCS | Performed by: NURSE PRACTITIONER

## 2020-08-13 PROCEDURE — 85610 PROTHROMBIN TIME: CPT

## 2020-08-13 PROCEDURE — 93005 ELECTROCARDIOGRAM TRACING: CPT | Performed by: STUDENT IN AN ORGANIZED HEALTH CARE EDUCATION/TRAINING PROGRAM

## 2020-08-13 PROCEDURE — 84484 ASSAY OF TROPONIN QUANT: CPT

## 2020-08-13 PROCEDURE — 2580000003 HC RX 258: Performed by: NURSE PRACTITIONER

## 2020-08-13 PROCEDURE — 96374 THER/PROPH/DIAG INJ IV PUSH: CPT

## 2020-08-13 PROCEDURE — 85014 HEMATOCRIT: CPT

## 2020-08-13 PROCEDURE — 6370000000 HC RX 637 (ALT 250 FOR IP): Performed by: NURSE PRACTITIONER

## 2020-08-13 PROCEDURE — 85730 THROMBOPLASTIN TIME PARTIAL: CPT

## 2020-08-13 PROCEDURE — 99223 1ST HOSP IP/OBS HIGH 75: CPT | Performed by: INTERNAL MEDICINE

## 2020-08-13 PROCEDURE — 6360000002 HC RX W HCPCS: Performed by: NURSE PRACTITIONER

## 2020-08-13 PROCEDURE — APPSS60 APP SPLIT SHARED TIME 46-60 MINUTES: Performed by: NURSE PRACTITIONER

## 2020-08-13 PROCEDURE — 36415 COLL VENOUS BLD VENIPUNCTURE: CPT

## 2020-08-13 PROCEDURE — 96375 TX/PRO/DX INJ NEW DRUG ADDON: CPT

## 2020-08-13 PROCEDURE — 80076 HEPATIC FUNCTION PANEL: CPT

## 2020-08-13 PROCEDURE — 85025 COMPLETE CBC W/AUTO DIFF WBC: CPT

## 2020-08-13 PROCEDURE — 80048 BASIC METABOLIC PNL TOTAL CA: CPT

## 2020-08-13 PROCEDURE — 99253 IP/OBS CNSLTJ NEW/EST LOW 45: CPT | Performed by: NURSE PRACTITIONER

## 2020-08-13 PROCEDURE — C9113 INJ PANTOPRAZOLE SODIUM, VIA: HCPCS | Performed by: STUDENT IN AN ORGANIZED HEALTH CARE EDUCATION/TRAINING PROGRAM

## 2020-08-13 PROCEDURE — 80307 DRUG TEST PRSMV CHEM ANLYZR: CPT

## 2020-08-13 PROCEDURE — 2060000000 HC ICU INTERMEDIATE R&B

## 2020-08-13 PROCEDURE — 71045 X-RAY EXAM CHEST 1 VIEW: CPT

## 2020-08-13 PROCEDURE — 82947 ASSAY GLUCOSE BLOOD QUANT: CPT

## 2020-08-13 PROCEDURE — 99285 EMERGENCY DEPT VISIT HI MDM: CPT

## 2020-08-13 PROCEDURE — 2580000003 HC RX 258: Performed by: STUDENT IN AN ORGANIZED HEALTH CARE EDUCATION/TRAINING PROGRAM

## 2020-08-13 PROCEDURE — 93010 ELECTROCARDIOGRAM REPORT: CPT | Performed by: INTERNAL MEDICINE

## 2020-08-13 PROCEDURE — G0480 DRUG TEST DEF 1-7 CLASSES: HCPCS

## 2020-08-13 PROCEDURE — 6360000002 HC RX W HCPCS: Performed by: STUDENT IN AN ORGANIZED HEALTH CARE EDUCATION/TRAINING PROGRAM

## 2020-08-13 PROCEDURE — 83735 ASSAY OF MAGNESIUM: CPT

## 2020-08-13 RX ORDER — LORAZEPAM 2 MG/1
2 TABLET ORAL
Status: DISCONTINUED | OUTPATIENT
Start: 2020-08-13 | End: 2020-08-14 | Stop reason: HOSPADM

## 2020-08-13 RX ORDER — SODIUM CHLORIDE 9 MG/ML
INJECTION, SOLUTION INTRAVENOUS CONTINUOUS
Status: DISCONTINUED | OUTPATIENT
Start: 2020-08-13 | End: 2020-08-14 | Stop reason: HOSPADM

## 2020-08-13 RX ORDER — ACETAMINOPHEN 325 MG/1
650 TABLET ORAL EVERY 6 HOURS PRN
Status: DISCONTINUED | OUTPATIENT
Start: 2020-08-13 | End: 2020-08-14 | Stop reason: HOSPADM

## 2020-08-13 RX ORDER — SODIUM CHLORIDE 0.9 % (FLUSH) 0.9 %
10 SYRINGE (ML) INJECTION EVERY 12 HOURS SCHEDULED
Status: DISCONTINUED | OUTPATIENT
Start: 2020-08-13 | End: 2020-08-14 | Stop reason: HOSPADM

## 2020-08-13 RX ORDER — THIAMINE HYDROCHLORIDE 100 MG/ML
100 INJECTION, SOLUTION INTRAMUSCULAR; INTRAVENOUS DAILY
Status: DISCONTINUED | OUTPATIENT
Start: 2020-08-13 | End: 2020-08-14 | Stop reason: HOSPADM

## 2020-08-13 RX ORDER — PANTOPRAZOLE SODIUM 40 MG/10ML
40 INJECTION, POWDER, LYOPHILIZED, FOR SOLUTION INTRAVENOUS ONCE
Status: COMPLETED | OUTPATIENT
Start: 2020-08-13 | End: 2020-08-13

## 2020-08-13 RX ORDER — POTASSIUM CHLORIDE 20 MEQ/1
40 TABLET, EXTENDED RELEASE ORAL PRN
Status: DISCONTINUED | OUTPATIENT
Start: 2020-08-13 | End: 2020-08-14 | Stop reason: HOSPADM

## 2020-08-13 RX ORDER — MULTIVITAMIN WITH IRON
1 TABLET ORAL DAILY
Status: DISCONTINUED | OUTPATIENT
Start: 2020-08-13 | End: 2020-08-14 | Stop reason: HOSPADM

## 2020-08-13 RX ORDER — PROMETHAZINE HYDROCHLORIDE 25 MG/1
12.5 TABLET ORAL EVERY 6 HOURS PRN
Status: DISCONTINUED | OUTPATIENT
Start: 2020-08-13 | End: 2020-08-14 | Stop reason: HOSPADM

## 2020-08-13 RX ORDER — LORAZEPAM 2 MG/ML
1 INJECTION INTRAMUSCULAR
Status: DISCONTINUED | OUTPATIENT
Start: 2020-08-13 | End: 2020-08-14 | Stop reason: HOSPADM

## 2020-08-13 RX ORDER — LORAZEPAM 2 MG/ML
4 INJECTION INTRAMUSCULAR
Status: DISCONTINUED | OUTPATIENT
Start: 2020-08-13 | End: 2020-08-14 | Stop reason: HOSPADM

## 2020-08-13 RX ORDER — SODIUM CHLORIDE 0.9 % (FLUSH) 0.9 %
10 SYRINGE (ML) INJECTION PRN
Status: DISCONTINUED | OUTPATIENT
Start: 2020-08-13 | End: 2020-08-14 | Stop reason: HOSPADM

## 2020-08-13 RX ORDER — SODIUM CHLORIDE 9 MG/ML
10 INJECTION INTRAVENOUS ONCE
Status: COMPLETED | OUTPATIENT
Start: 2020-08-13 | End: 2020-08-13

## 2020-08-13 RX ORDER — POTASSIUM CHLORIDE 7.45 MG/ML
10 INJECTION INTRAVENOUS PRN
Status: DISCONTINUED | OUTPATIENT
Start: 2020-08-13 | End: 2020-08-14 | Stop reason: HOSPADM

## 2020-08-13 RX ORDER — ONDANSETRON 2 MG/ML
4 INJECTION INTRAMUSCULAR; INTRAVENOUS EVERY 6 HOURS PRN
Status: DISCONTINUED | OUTPATIENT
Start: 2020-08-13 | End: 2020-08-14 | Stop reason: HOSPADM

## 2020-08-13 RX ORDER — QUETIAPINE FUMARATE 300 MG/1
300 TABLET, FILM COATED ORAL NIGHTLY
Status: DISCONTINUED | OUTPATIENT
Start: 2020-08-13 | End: 2020-08-14 | Stop reason: HOSPADM

## 2020-08-13 RX ORDER — POLYETHYLENE GLYCOL 3350 17 G/17G
17 POWDER, FOR SOLUTION ORAL DAILY PRN
Status: DISCONTINUED | OUTPATIENT
Start: 2020-08-13 | End: 2020-08-14 | Stop reason: HOSPADM

## 2020-08-13 RX ORDER — ONDANSETRON 2 MG/ML
4 INJECTION INTRAMUSCULAR; INTRAVENOUS ONCE
Status: COMPLETED | OUTPATIENT
Start: 2020-08-13 | End: 2020-08-13

## 2020-08-13 RX ORDER — ACETAMINOPHEN 650 MG/1
650 SUPPOSITORY RECTAL EVERY 6 HOURS PRN
Status: DISCONTINUED | OUTPATIENT
Start: 2020-08-13 | End: 2020-08-14 | Stop reason: HOSPADM

## 2020-08-13 RX ORDER — LORAZEPAM 2 MG/ML
2 INJECTION INTRAMUSCULAR
Status: DISCONTINUED | OUTPATIENT
Start: 2020-08-13 | End: 2020-08-14 | Stop reason: HOSPADM

## 2020-08-13 RX ORDER — NICOTINE 21 MG/24HR
1 PATCH, TRANSDERMAL 24 HOURS TRANSDERMAL DAILY PRN
Status: DISCONTINUED | OUTPATIENT
Start: 2020-08-13 | End: 2020-08-14 | Stop reason: HOSPADM

## 2020-08-13 RX ORDER — BUPROPION HYDROCHLORIDE 150 MG/1
150 TABLET ORAL EVERY MORNING
Status: DISCONTINUED | OUTPATIENT
Start: 2020-08-13 | End: 2020-08-14 | Stop reason: HOSPADM

## 2020-08-13 RX ORDER — LORAZEPAM 2 MG/ML
3 INJECTION INTRAMUSCULAR
Status: DISCONTINUED | OUTPATIENT
Start: 2020-08-13 | End: 2020-08-14 | Stop reason: HOSPADM

## 2020-08-13 RX ORDER — LORAZEPAM 2 MG/1
4 TABLET ORAL
Status: DISCONTINUED | OUTPATIENT
Start: 2020-08-13 | End: 2020-08-14 | Stop reason: HOSPADM

## 2020-08-13 RX ORDER — LORAZEPAM 1 MG/1
1 TABLET ORAL
Status: DISCONTINUED | OUTPATIENT
Start: 2020-08-13 | End: 2020-08-14 | Stop reason: HOSPADM

## 2020-08-13 RX ADMIN — QUETIAPINE FUMARATE 300 MG: 300 TABLET ORAL at 21:10

## 2020-08-13 RX ADMIN — SODIUM CHLORIDE 8 MG/HR: 9 INJECTION, SOLUTION INTRAVENOUS at 12:30

## 2020-08-13 RX ADMIN — OCTREOTIDE ACETATE 50 MCG/HR: 500 INJECTION, SOLUTION INTRAVENOUS; SUBCUTANEOUS at 20:51

## 2020-08-13 RX ADMIN — SODIUM CHLORIDE 8 MG/HR: 9 INJECTION, SOLUTION INTRAVENOUS at 20:48

## 2020-08-13 RX ADMIN — SODIUM CHLORIDE 80 MG: 9 INJECTION, SOLUTION INTRAVENOUS at 12:00

## 2020-08-13 RX ADMIN — SODIUM CHLORIDE: 9 INJECTION, SOLUTION INTRAVENOUS at 10:52

## 2020-08-13 RX ADMIN — PANTOPRAZOLE SODIUM 40 MG: 40 INJECTION, POWDER, FOR SOLUTION INTRAVENOUS at 01:14

## 2020-08-13 RX ADMIN — ONDANSETRON 4 MG: 2 INJECTION INTRAMUSCULAR; INTRAVENOUS at 01:15

## 2020-08-13 RX ADMIN — LORAZEPAM 3 MG: 2 TABLET ORAL at 12:00

## 2020-08-13 RX ADMIN — OCTREOTIDE ACETATE 50 MCG/HR: 500 INJECTION, SOLUTION INTRAVENOUS; SUBCUTANEOUS at 12:06

## 2020-08-13 RX ADMIN — Medication 10 ML: at 21:11

## 2020-08-13 RX ADMIN — BUPROPION HYDROCHLORIDE 150 MG: 150 TABLET, EXTENDED RELEASE ORAL at 12:00

## 2020-08-13 RX ADMIN — SODIUM CHLORIDE 10 ML: 9 INJECTION, SOLUTION INTRAMUSCULAR; INTRAVENOUS; SUBCUTANEOUS at 01:14

## 2020-08-13 ASSESSMENT — ENCOUNTER SYMPTOMS
CONSTIPATION: 0
SINUS PAIN: 0
COUGH: 0
BLOOD IN STOOL: 0
SHORTNESS OF BREATH: 0
ABDOMINAL PAIN: 0
WHEEZING: 0
ABDOMINAL DISTENTION: 0
EYE PAIN: 0
VOMITING: 1
SORE THROAT: 0
STRIDOR: 0
EYE ITCHING: 0
SINUS PRESSURE: 0
NAUSEA: 1
DIARRHEA: 0

## 2020-08-13 ASSESSMENT — PAIN SCALES - GENERAL
PAINLEVEL_OUTOF10: 0
PAINLEVEL_OUTOF10: 0

## 2020-08-13 NOTE — ED PROVIDER NOTES
Social Needs    Financial resource strain: Not on file    Food insecurity     Worry: Not on file     Inability: Not on file    Transportation needs     Medical: Not on file     Non-medical: Not on file   Tobacco Use    Smoking status: Never Smoker    Smokeless tobacco: Never Used    Tobacco comment: pt is a nonsmioker   Substance and Sexual Activity    Alcohol use: Yes     Comment: a fifth a day vodka/day    Drug use: No    Sexual activity: Not Currently   Lifestyle    Physical activity     Days per week: Not on file     Minutes per session: Not on file    Stress: Not on file   Relationships    Social connections     Talks on phone: Not on file     Gets together: Not on file     Attends Hindu service: Not on file     Active member of club or organization: Not on file     Attends meetings of clubs or organizations: Not on file     Relationship status: Not on file    Intimate partner violence     Fear of current or ex partner: Not on file     Emotionally abused: Not on file     Physically abused: Not on file     Forced sexual activity: Not on file   Other Topics Concern    Not on file   Social History Narrative    Not on file       Family History   Adopted: Yes   Problem Relation Age of Onset    Asthma Mother     Crohn's Disease Mother     No Known Problems Father     Other Sister         Fibromyalgia    No Known Problems Brother     No Known Problems Maternal Grandmother     No Known Problems Maternal Grandfather     No Known Problems Paternal Grandmother     No Known Problems Paternal Grandfather     No Known Problems Daughter     Depression Daughter     No Known Problems Son        Allergies:  Pcn [penicillins]    Home Medications:  Prior to Admission medications    Medication Sig Start Date End Date Taking?  Authorizing Provider   QUEtiapine (SEROQUEL) 300 MG tablet Take 1 tablet by mouth nightly 4/15/19  Yes Aranza Case MD   nicotine (NICODERM CQ) 21 MG/24HR Place 1 patch onto the skin daily as needed (if patient is a smoker and requests nicotine replacemnt therapy) 7/22/20   Donnell Pal   fluticasone [facility]) 50 MCG/ACT nasal spray 2 sprays by Nasal route daily  Patient not taking: Reported on 12/17/2019 8/29/19   EMANUEL Ronquillo CNP   ondansetron (ZOFRAN) 4 MG tablet Take 1 tablet by mouth every 8 hours as needed for Nausea or Vomiting  Patient not taking: Reported on 12/17/2019 6/16/19   Blank Hays MD   folic acid (FOLVITE) 1 MG tablet Take 1 tablet by mouth daily  Patient not taking: Reported on 12/17/2019 6/17/19   Blank Hays MD   midodrine (PROAMATINE) 5 MG tablet Take 1 tablet by mouth 3 times daily (with meals) Hold if systolic blood pressure is more than 110  Patient not taking: Reported on 12/17/2019 6/17/19   Blank Hays MD   vitamin B-1 100 MG tablet Take 1 tablet by mouth daily  Patient not taking: Reported on 12/17/2019 6/17/19   Blnak Hays MD   buPROPion (WELLBUTRIN XL) 150 MG extended release tablet Take 150 mg by mouth every morning    Historical Provider, MD   Multiple Vitamin (MULTIVITAMIN) tablet Take 1 tablet by mouth daily  Patient not taking: Reported on 12/17/2019 5/30/19   Khadijah Hope DO   pantoprazole (PROTONIX) 40 MG tablet Take 1 tablet by mouth every morning (before breakfast)  Patient not taking: Reported on 12/17/2019 5/30/19   Khadijah Hope DO   traZODone (DESYREL) 50 MG tablet Take 1 tablet by mouth nightly as needed for Sleep 4/15/19   Hudson Guillory MD       REVIEW OF SYSTEMS    (2-9 systems for level 4, 10 or more for level 5)      Review of Systems   Constitutional: Negative for activity change, chills and fever. HENT: Negative for congestion, sinus pressure, sinus pain and sore throat. Eyes: Negative for pain and itching. Respiratory: Negative for cough and shortness of breath. Cardiovascular: Negative for chest pain. Gastrointestinal: Positive for nausea and vomiting.  Negative for abdominal distention, flush 0.9 % injection 10 mL    sodium chloride flush 0.9 % injection 10 mL    thiamine (B-1) injection 100 mg    multivitamin 1 tablet       DDX: domingo zimmerman tear, lower concern for esophageal varices, PUD, alcoholic cirrhosis, portal HTN    DIAGNOSTIC RESULTS / EMERGENCY DEPARTMENT COURSE / MDM   LAB RESULTS:  Results for orders placed or performed during the hospital encounter of 08/13/20   CBC Auto Differential   Result Value Ref Range    WBC 10.0 3.5 - 11.3 k/uL    RBC 4.55 4.21 - 5.77 m/uL    Hemoglobin 14.7 13.0 - 17.0 g/dL    Hematocrit 45.0 40.7 - 50.3 %    MCV 98.9 82.6 - 102.9 fL    MCH 32.3 25.2 - 33.5 pg    MCHC 32.7 28.4 - 34.8 g/dL    RDW 13.2 11.8 - 14.4 %    Platelets 725 781 - 407 k/uL    MPV 10.5 8.1 - 13.5 fL    NRBC Automated 0.0 0.0 per 100 WBC    Differential Type NOT REPORTED     Seg Neutrophils 83 (H) 36 - 65 %    Lymphocytes 10 (L) 24 - 43 %    Monocytes 4 3 - 12 %    Eosinophils % 1 1 - 4 %    Basophils 1 0 - 2 %    Immature Granulocytes 1 (H) 0 %    Segs Absolute 8.43 (H) 1.50 - 8.10 k/uL    Absolute Lymph # 0.95 (L) 1.10 - 3.70 k/uL    Absolute Mono # 0.43 0.10 - 1.20 k/uL    Absolute Eos # 0.06 0.00 - 0.44 k/uL    Basophils Absolute 0.05 0.00 - 0.20 k/uL    Absolute Immature Granulocyte 0.05 0.00 - 0.30 k/uL    WBC Morphology NOT REPORTED     RBC Morphology NOT REPORTED     Platelet Estimate NOT REPORTED    Hepatic Function Panel   Result Value Ref Range    Alb 4.5 3.5 - 5.2 g/dL    Alkaline Phosphatase 59 40 - 129 U/L    ALT 18 5 - 41 U/L    AST 22 <40 U/L    Total Bilirubin 0.25 (L) 0.3 - 1.2 mg/dL    Bilirubin, Direct <0.08 <0.31 mg/dL    Bilirubin, Indirect CANNOT BE CALCULATED 0.00 - 1.00 mg/dL    Total Protein 7.5 6.4 - 8.3 g/dL    Globulin NOT REPORTED 1.5 - 3.8 g/dL    Albumin/Globulin Ratio 1.5 1.0 - 2.5   Protime-INR   Result Value Ref Range    Protime 9.5 9.0 - 12.0 sec    INR 0.9    APTT   Result Value Ref Range    PTT 24.2 20.5 - 30.5 sec   Basic Metabolic Panel Result Value Ref Range    Glucose 109 (H) 70 - 99 mg/dL    BUN 19 6 - 20 mg/dL    CREATININE 0.60 (L) 0.70 - 1.20 mg/dL    Bun/Cre Ratio NOT REPORTED 9 - 20    Calcium 8.7 8.6 - 10.4 mg/dL    Sodium 141 135 - 144 mmol/L    Potassium 4.4 3.7 - 5.3 mmol/L    Chloride 101 98 - 107 mmol/L    CO2 19 (L) 20 - 31 mmol/L    Anion Gap 21 (H) 9 - 17 mmol/L    GFR Non-African American >60 >60 mL/min    GFR African American >60 >60 mL/min    GFR Comment          GFR Staging NOT REPORTED    MAGNESIUM   Result Value Ref Range    Magnesium 2.2 1.6 - 2.6 mg/dL   Troponin   Result Value Ref Range    Troponin, High Sensitivity 7 0 - 22 ng/L    Troponin T NOT REPORTED <0.03 ng/mL    Troponin Interp NOT REPORTED    TOX SCR, BLD, ED   Result Value Ref Range    Acetaminophen Level <5 (L) 10 - 30 ug/mL    Ethanol 70 (H) <10 mg/dL    Ethanol percent 0.070 (H) <5.545 %    Salicylate Lvl <1 (L) 3 - 10 mg/dL    Toxic Tricyclic Sc,Blood NEGATIVE NEGATIVE   EKG 12 Lead   Result Value Ref Range    Ventricular Rate 93 BPM    Atrial Rate 93 BPM    P-R Interval 162 ms    QRS Duration 94 ms    Q-T Interval 350 ms    QTc Calculation (Bazett) 435 ms    P Axis 53 degrees    R Axis 55 degrees    T Axis 36 degrees       IMPRESSION: Vin Orantes is a 45 y.o. man presenting for hematemesis <1 day duration. Has a history of alcoholism and has been evaluated for hematemesis in the past. Last scope was 1 yr ago. Hx consistent with domingo zimmerman tear vs esophageal varices. No abdominal pain, doubt PUD. Also has a history of alcohol withdrawal and last drink was 1800. Recommend admission for alcohol withdrawal and likely GI evaluation. RADIOLOGY:  None    EKG  Reviewed    All EKG's are interpreted by the Emergency Department Physician who either signs or Co-signs this chart in the absence of a cardiologist.    EMERGENCY DEPARTMENT COURSE:  Patient seen and evaluated, VSS and nontoxic in appearance. Labs ordered and generally unremarkable. Hgb WNL. Liver enzymes unremarkable. [facility] contacted for admission for GI evaluation of hematemesis as well as management of alcohol withdrawal. CIWA protocol initiated, no ativan given in the ED. PROCEDURES:  None    CONSULTS:  IP CONSULT TO HOSPITALIST  IP CONSULT TO SOCIAL WORK  IP CONSULT TO GI  IP CONSULT TO SOCIAL WORK  IP CONSULT TO GI    CRITICAL CARE:  Please see attending note    FINAL IMPRESSION      1. Hematemesis with nausea          DISPOSITION / PLAN     DISPOSITION Admitted 08/13/2020 04:52:04 AM      PATIENT REFERRED TO:  .  None            DISCHARGE MEDICATIONS:  New Prescriptions    No medications on file       Aliyah Steel DO  Emergency Medicine Resident    (Please note that portions of thisnote were completed with a voice recognition program.  Efforts were made to edit the dictations but occasionally words are mis-transcribed.)      Aliyah Steel DO  Resident  08/13/20 [id number] [facility]  Resident  08/13/20 1306

## 2020-08-13 NOTE — ED NOTES
Pt. Resting on stretcher, NAD, RR even and unlabored  Pt.  Denies needs at this time  No vomiting or nausea reported  Call light within reach  Will continue to monitor and reassess     Elijah Richter RN  08/13/20 [id number]

## 2020-08-13 NOTE — CONSULTS
THE [facility] AT [place] [facility]  Consultation Note     . REASON FOR CONSULTATION:    Hematemesis    HISTORY OF PRESENT ILLNESS:     This is a 45 y.o. male who presented with complaints of hematemesis x 5 episodes yesterday morning prior to admission. Pt states that he has been drinking a 1/5 of Vodka daily. Pt reports similar situation aprox 6 months ago after drinking, was hospitalized and had endoscopy in [place] (?? No records found) that revealed inflammation according to him. Per Care Everywhere, 10/2018 EGD showed Grade B Esophagitis, small HH, moderate PHG-no EV or GV. 5/2019 EGD showed Grade A Esophagitis, no varices. Small HH, mild duodenitis  Pt denies any fever, chills, diarrhea, constipation, hematochezia or melena. No Nsaid use, smoking, or drug use. No previous colonoscopy  No family history of colon cancer    No abdominal imaging completed at this time  CBC WNL, INR 0.9, 2019 Hep panal non reactive, 2019 Liver US indicated hepatic steatosis    Previous GI history:   5/28/2019 EGD per Dr. Israel Skelton:  IMPRESSION:     No signs of active bleeding     1-Mild LA grade A esophagitis, inactive and healing. No esophageal varices, no MWT. Small type 1 Hiatal hernia, 1 cm in length, no main erosions. 2-Mild non-specific gastritis, cold biopsy taken for H. Pylori. No ulcerations, no erosions, no residual blood, no obvious sources of hemorrhage or anemia. 3-Mild duodenitis, no ulcerations, no erosions, no sources of hemorrhage or anemia identified     RECOMMENDATIONS:   1) Can transition to PO prilosec 80 mg BID (continue for 8 weeks), CLD, advance as tolerated. 2) Patient will aggressive counseling on alcohol cessation. Avoids NSAIDS and smoking. Return back to medical floor.  If patient has stable Hg, tolerating diet, and not withdrawing from alcohol, ok from GI standpoint for disposition.      Israel Skelton MD  [facility]     10/2018 EGD:  Shannan Lopez MD, PhD     10/15/2018 12:30 PM  EGD performed:  1) Grade B esophagitis  2) Small HH  3) Moderate PHG  4) No EV or GV  I/P  Continue BID PPI  Stop sandostatin  Advance diet as tolerated    Past Medical/Social/Family History:  No family history of colon cancer or GI malignancy    Past Medical History:   Diagnosis Date    Acute superficial gastritis without hemorrhage 5/30/2019    Alcohol abuse 6/5/2014    Coffee ground emesis     Duodenitis 5/30/2019    Esophagitis 6/7/2014    GI bleed 6/5/2014    Hematemesis 6/5/2014    Hepatic steatosis 5/29/2019    Hypertension     Per patient BP only increases when he drinks alcohol.  Intractable vomiting 6/1/1225    Metabolic acidosis, increased anion gap 5/29/2019    Severe major depression ([address] 4/10/2019    Suicidal behavior 12/13/2018    Upper GI bleed 12/4/2019     Past Surgical History:   Procedure Laterality Date    HERNIA REPAIR      left    HERNIA REPAIR Right 5/16/2019    XI ROBOTIC LAPAROSCOPIC  INGUINAL HERNIA REPAIR WITH MESH, POSSIBLE BILATERAL, POSSIBLE OPEN performed by Isreal Harada Canos IV, DO at [facility]  6-5-14    gastritis, esophagitis, ulcers-koko    UPPER GASTROINTESTINAL ENDOSCOPY N/A 5/30/2019    EGD BIOPSY performed by Xavi Funez MD at [facility] N/A 12/5/2019    EGD performed by Elaine Obrien MD at [place] [address] History   Adopted: Yes   Problem Relation Age of Onset    Asthma Mother     Crohn's Disease Mother     No Known Problems Father     Other Sister         Fibromyalgia    No Known Problems Brother     No Known Problems Maternal Grandmother     No Known Problems Maternal Grandfather     No Known Problems Paternal Grandmother     No Known Problems Paternal Grandfather     No Known Problems Daughter     Depression Daughter     No Known Problems Son        Allergies:   Allergies   Allergen Reactions    Pcn [Penicillins] Rash       Home medications:  Prior to Admission medications    Medication Sig Start Date End Date Taking? Authorizing Provider   QUEtiapine (SEROQUEL) 300 MG tablet Take 1 tablet by mouth nightly 4/15/19  Yes Brittny Ho MD   nicotine (NICODERM CQ) 21 MG/24HR Place 1 patch onto the skin daily as needed (if patient is a smoker and requests nicotine replacemnt therapy) 7/22/20   Roselee [place]   fluticasone [place]) 50 MCG/ACT nasal spray 2 sprays by Nasal route daily  Patient not taking: Reported on 12/17/2019 8/29/19   EMANUEL Hwang - CNP   ondansetron (ZOFRAN) 4 MG tablet Take 1 tablet by mouth every 8 hours as needed for Nausea or Vomiting  Patient not taking: Reported on 12/17/2019 6/16/19   Raeann Casey MD   folic acid (FOLVITE) 1 MG tablet Take 1 tablet by mouth daily  Patient not taking: Reported on 12/17/2019 6/17/19   Raeann Casey MD   midodrine (PROAMATINE) 5 MG tablet Take 1 tablet by mouth 3 times daily (with meals) Hold if systolic blood pressure is more than 110  Patient not taking: Reported on 12/17/2019 6/17/19   Raeann Casey MD   vitamin B-1 100 MG tablet Take 1 tablet by mouth daily  Patient not taking: Reported on 12/17/2019 6/17/19   Raeann Casey MD   buPROPion (WELLBUTRIN XL) 150 MG extended release tablet Take 150 mg by mouth every morning    Historical Provider, MD   Multiple Vitamin (MULTIVITAMIN) tablet Take 1 tablet by mouth daily  Patient not taking: Reported on 12/17/2019 5/30/19   Caro Hope DO   pantoprazole (PROTONIX) 40 MG tablet Take 1 tablet by mouth every morning (before breakfast)  Patient not taking: Reported on 12/17/2019 5/30/19   Caro Hope DO   traZODone (DESYREL) 50 MG tablet Take 1 tablet by mouth nightly as needed for Sleep 4/15/19   Brittny Ho MD     .  Current Medications:  Scheduled Meds:   sodium chloride flush  10 mL Intravenous 2 times per day    thiamine  100 mg Intravenous Daily    multivitamin  1 tablet Oral Daily     .   Continuous Infusions:  . PRN Meds:sodium chloride flush    REVIEW OF SYSTEMS:     Constitutional: No fever, no chills, no lethargy, no weakness, no weight loss  HEENT:  No headache, otalgia, itchy eyes, nasal discharge or sore throat. Cardiac:  No chest pain, dyspnea, orthopnea or PND. Chest:   No cough, phlegm or wheezing. Abdomen:  Hematemesis x 5 episode  Neuro:  No focal weakness, abnormal movements or seizure like activity. Skin:   No rashes, no itching. :   No hematuria, no pyuria, no dysuria, no flank pain. Extremities:  No swelling or joint pains. ROS was otherwise negative except as mentioned in the [address] PHYSICAL EXAM:    /84   Pulse 84   Temp 97.9 °F (36.6 °C) (Oral)   Resp 14   SpO2 96%   . TMAX[24]    General: Well developed, Well nourished, No apparent distress  Head:  Normocephalic, Atraumatic  EENT: EOMI, Sclera not icteric, Oropharynx moist  Neck:  Supple, Trachea midline  Lungs:CTA Bilaterally  Heart: RRR, No murmur, No rub, No gallop, PMI nondisplaced. Abdomen:Soft, Non tender, Not distended, BS WNL,  No masses. No hepatomegalia   Ext:No clubbing. No cyanosis. No edema. Skin: No rashes. No jaundice. No stigmata of liver disease. Neuro:  A&O x Three, slightly anxious    Labs and Imaging:   None completed at this time  Hemotological labs: Anemia studies:  No results for input(s): LABIRON, TIBC, FERRITIN, [id number], FOLATE, OCCULTBLD in the last 72 hours.     CBC:  Recent Labs     08/13/20 0115   WBC 10.0   HGB 14.7   MCV 98.9   RDW 13.2          PT/INR:  Recent Labs     08/13/20 0115   PROTIME 9.5   INR 0.9       BMP:  Recent Labs     08/13/20 0115      K 4.4      CO2 19*   BUN 19   CREATININE 0.60*   GLUCOSE 109*   CALCIUM 8.7       Liver work up:  Hepatitis Functional Panel:  Recent Labs     08/13/20 0115   ALKPHOS 59   ALT 18   AST 22   PROT 7.5   BILITOT 0.25*   BILIDIR <0.08   LABALBU 4.5       Amylase/Lipase/Ammonia:  No results for input(s):

## 2020-08-13 NOTE — ED PROVIDER NOTES
[facility]     Emergency Department     Faculty Attestation    I performed a history and physical examination of the patient and discussed management with the resident. I have reviewed and agree with the residents findings including all diagnostic interpretations, and treatment plans as written. Any areas of disagreement are noted on the chart. I was personally present for the key portions of any procedures. I have documented in the chart those procedures where I was not present during the key portions. I have reviewed the emergency nurses triage note. I agree with the chief complaint, past medical history, past surgical history, allergies, medications, social and family history as documented unless otherwise noted below. Documentation of the HPI, Physical Exam and Medical Decision Making performed by scribarnold is based on my personal performance of the HPI, PE and MDM. For Physician Assistant/ Nurse Practitioner cases/documentation I have personally evaluated this patient and have completed at least one if not all key elements of the E/M (history, physical exam, and MDM). Additional findings are as noted. 46 yo M hematemesis, pt reports last etoh 6 h prior, no injury, no fever, no suicidal or homicidal ideation,   pe afebrile, gcs 15, airway intact, mild lingual tremor, no cervical tenderness or deformity, abdomen mild distension, no tenderness, no rigidity, no guarding, extremities atraumatic,     etoh .07, vss, thiamin, protonix, zofran given, admitting for withdrawal syndrome & gi consult,     EKG Interpretation    Interpreted by me  Normal sinus, hr 93, no ischemia, normal axis, qtc 435,     CRITICAL CARE: There was a high probability of clinically significant/life threatening deterioration in this patient's condition which required my urgent intervention. Total critical care time was 15 minutes.   This excludes any time for separately reportable

## 2020-08-13 NOTE — ED NOTES
Pt resting in bed, eyes closed. Even non labored RR. Will continue to monitor.         Marina Gautam RN  08/13/20 [id number]

## 2020-08-13 NOTE — H&P
[facility]    HISTORY AND PHYSICAL EXAMINATION            Date:   8/13/2020  Patient name:  Fan Manning  Date of admission:  8/13/2020 12:35 AM  MRN:   [id number]  Account:  [de-identified]  YOB: 1970  PCP:    . None (Inactive)  Room:   25/25  Code Status:    Prior    Chief Complaint:     Chief Complaint   Patient presents with    Hematemesis     vomiting since this morning. Arch Barbara off the wagon and started dinking again. statesl last drink was 6 hours prior to arrival. drank a 5th       History Obtained From:     patient, electronic medical record    History of Present Illness:     Fan Manning is a 45 y.o. Non-/non  male who presents with Hematemesis (vomiting since this morning. Arch Barbara off the wagon and started dinking again. statesl last drink was 6 hours prior to arrival. drank a 5th)   and is admitted to the hospital for the management of hematemesis, alcohol withdrawal.    Patient presents to the emergency room with the complain of hematemesis since noon today ( 8/12/2020) and alcohol withdrawal.  He states that he has had 6 episodes of emesis with blood clots and streaking blood in them. Patient denies fever or chills chest pain or shortness of breath. Patient denies abdomen pain, constipation or diarrhea, no bloody stools or melena. He complains that he is having alcohol withdrawal symptoms and is having the shakes. He states his previous withdrawal was complicated with hallucinations and seizure x1. Work up in the emergency room     Vitals:   97.9, RR 16, HR 93, bp 115/82, 94% Room air. Laboratories:   · Metabolic panel sodium 785, potassium 4.4, chloride 101, co2 19,  creatinine 0.6, anion gap 21, glucose 109,   · GI liver panel unremarkable  · Hematology  Without acute leukocytosis or anemia with a wbc of 10.0, Hgb 14.7/ hct 45.0, plt 344,   · Clotting pt 9.5 INR 0.9, ptt 24. 2.    · Cardiac markers normal with hs trop 7. Imaging:   · Xr Chest Portable  Marginal inspiration, without evidence of acute cardiopulmonary disease       Past Medical History:     Past Medical History:   Diagnosis Date    Alcohol abuse 6/5/2014    GI bleed 6/5/2014    Hematemesis 6/5/2014    Hypertension     Per patient BP only increases when he drinks alcohol.  Intractable vomiting 7/7/1363    Metabolic acidosis, increased anion gap 5/29/2019    Suicidal behavior 12/13/2018        Past Surgical History:     Past Surgical History:   Procedure Laterality Date    HERNIA REPAIR      left    HERNIA REPAIR Right 5/16/2019    XI ROBOTIC LAPAROSCOPIC  INGUINAL HERNIA REPAIR WITH MESH, POSSIBLE BILATERAL, POSSIBLE OPEN performed by Ab[place]ola [place] IV, DO at [facility]  6-5-14    gastritis, esophagitis, ulcers-koko    UPPER GASTROINTESTINAL ENDOSCOPY N/A 5/30/2019    EGD BIOPSY performed by Chayo Hays MD at [facility] 12/5/2019    EGD performed by Qamar Rodgers MD at [facility]        Medications Prior to Admission:     Prior to Admission medications    Medication Sig Start Date End Date Taking?  Authorizing Provider   QUEtiapine (SEROQUEL) 300 MG tablet Take 1 tablet by mouth nightly 4/15/19  Yes Bernarda West MD   nicotine (NICODERM CQ) 21 MG/24HR Place 1 patch onto the skin daily as needed (if patient is a smoker and requests nicotine replacemnt therapy) 7/22/20   Oseas Muniz   fluticasone [facility]) 50 MCG/ACT nasal spray 2 sprays by Nasal route daily  Patient not taking: Reported on 12/17/2019 8/29/19   EMANUEL Glalego - CNP   ondansetron (ZOFRAN) 4 MG tablet Take 1 tablet by mouth every 8 hours as needed for Nausea or Vomiting  Patient not taking: Reported on 12/17/2019 6/16/19   Cipriano Melgar MD   folic acid (FOLVITE) 1 MG tablet Take 1 tablet by mouth daily  Patient not taking: Reported on 12/17/2019 6/17/19   Cipriano Melgar MD breath, wheezing and stridor. Cardiovascular: Negative for chest pain, palpitations and leg swelling. Gastrointestinal: Positive for nausea and vomiting. Negative for abdominal pain, blood in stool, constipation and diarrhea. Genitourinary: Negative for dysuria and frequency. Musculoskeletal: Negative for myalgias. Skin: Negative for rash. Neurological: Positive for tremors, weakness and headaches. Negative for dizziness and seizures. Psychiatric/Behavioral: Negative for confusion, decreased concentration, dysphoric mood, hallucinations, self-injury and suicidal ideas. The patient is nervous/anxious. Physical Exam:   /82   Pulse 93   Temp 97.9 °F (36.6 °C) (Oral)   Resp 16   SpO2 94%   Temp (24hrs), Av.9 °F (36.6 °C), Min:97.9 °F (36.6 °C), Max:97.9 °F (36.6 °C)    No results for input(s): POCGLU in the last 72 hours. No intake or output data in the 24 hours ending 20 [id number]    Physical Exam  Vitals signs and nursing note reviewed. Constitutional:       General: He is not in acute distress. Appearance: He is well-developed. He is not diaphoretic. HENT:      Head: Normocephalic and atraumatic. Right Ear: Hearing normal.      Left Ear: Hearing normal.      Nose: Nose normal. No rhinorrhea. Eyes:      General: Lids are normal.      Extraocular Movements:      Right eye: Normal extraocular motion. Left eye: Normal extraocular motion. Conjunctiva/sclera: Conjunctivae normal.      Right eye: Right conjunctiva is not injected. Left eye: Left conjunctiva is not injected. Pupils: Pupils are equal, round, and reactive to light. Pupils are equal.      Right eye: Pupil is reactive. Left eye: Pupil is reactive. Neck:      Musculoskeletal: Neck supple. Thyroid: No thyromegaly. Vascular: No carotid bruit. Trachea: Trachea and phonation normal. No tracheal deviation.    Cardiovascular:      Rate and Rhythm: Normal rate and regular rhythm. Pulses: Normal pulses. Heart sounds: Normal heart sounds. No murmur. Pulmonary:      Effort: Pulmonary effort is normal. No respiratory distress. Breath sounds: Normal breath sounds. No stridor. No decreased breath sounds. Abdominal:      General: Bowel sounds are increased. There is distension. Palpations: Abdomen is soft. There is no mass. Tenderness: There is no abdominal tenderness. There is no guarding. Musculoskeletal:         General: No tenderness. Skin:     General: Skin is warm and dry. Findings: No erythema, lesion or rash. Neurological:      Mental Status: He is alert and oriented to person, place, and time. He is not disoriented. Cranial Nerves: No cranial nerve deficit. Psychiatric:         Attention and Perception: Attention normal.         Mood and Affect: Affect is flat. Speech: Speech normal.         Behavior: Behavior normal. Behavior is cooperative. Comments: Nausea and vomiting    4:  Intermittent nausea with dry heaves   vomiting    Paroxysmal sweats  0: No sweats visible    Anxiety  1    Agitation    1: Somewhat more than normal activity    4: Moderate when patient's hands extended    Headache    2: Mild    Auditory disturbances  0: Not present    Visual disturbances  0: Not present    Tactile disturbances  0: None    Orientation and clouding of sensorium  0: Oriented and can do serial additions    Total score is a simple sum of each item score (maximum score is 67)  Score: 12    10 to 15: Mild withdrawal         Investigations:      Laboratory Testing:  Recent Results (from the past 24 hour(s))   CBC Auto Differential    Collection Time: 08/13/20  1:15 AM   Result Value Ref Range    WBC 10.0 3.5 - 11.3 k/uL    RBC 4.55 4.21 - 5.77 m/uL    Hemoglobin 14.7 13.0 - 17.0 g/dL    Hematocrit 45.0 40.7 - 50.3 %    MCV 98.9 82.6 - 102.9 fL    MCH 32.3 25.2 - 33.5 pg    MCHC 32.7 28.4 - 34.8 g/dL    RDW 13.2 11.8 - 14.4 %    Platelets 344 138 - 453 k/uL    MPV 10.5 8.1 - 13.5 fL    NRBC Automated 0.0 0.0 per 100 WBC    Differential Type NOT REPORTED     Seg Neutrophils 83 (H) 36 - 65 %    Lymphocytes 10 (L) 24 - 43 %    Monocytes 4 3 - 12 %    Eosinophils % 1 1 - 4 %    Basophils 1 0 - 2 %    Immature Granulocytes 1 (H) 0 %    Segs Absolute 8.43 (H) 1.50 - 8.10 k/uL    Absolute Lymph # 0.95 (L) 1.10 - 3.70 k/uL    Absolute Mono # 0.43 0.10 - 1.20 k/uL    Absolute Eos # 0.06 0.00 - 0.44 k/uL    Basophils Absolute 0.05 0.00 - 0.20 k/uL    Absolute Immature Granulocyte 0.05 0.00 - 0.30 k/uL    WBC Morphology NOT REPORTED     RBC Morphology NOT REPORTED     Platelet Estimate NOT REPORTED    Hepatic Function Panel    Collection Time: 08/13/20  1:15 AM   Result Value Ref Range    Alb 4.5 3.5 - 5.2 g/dL    Alkaline Phosphatase 59 40 - 129 U/L    ALT 18 5 - 41 U/L    AST 22 <40 U/L    Total Bilirubin 0.25 (L) 0.3 - 1.2 mg/dL    Bilirubin, Direct <0.08 <0.31 mg/dL    Bilirubin, Indirect CANNOT BE CALCULATED 0.00 - 1.00 mg/dL    Total Protein 7.5 6.4 - 8.3 g/dL    Globulin NOT REPORTED 1.5 - 3.8 g/dL    Albumin/Globulin Ratio 1.5 1.0 - 2.5   Protime-INR    Collection Time: 08/13/20  1:15 AM   Result Value Ref Range    Protime 9.5 9.0 - 12.0 sec    INR 0.9    APTT    Collection Time: 08/13/20  1:15 AM   Result Value Ref Range    PTT 24.2 20.5 - 30.5 sec   Basic Metabolic Panel    Collection Time: 08/13/20  1:15 AM   Result Value Ref Range    Glucose 109 (H) 70 - 99 mg/dL    BUN 19 6 - 20 mg/dL    CREATININE 0.60 (L) 0.70 - 1.20 mg/dL    Bun/Cre Ratio NOT REPORTED 9 - 20    Calcium 8.7 8.6 - 10.4 mg/dL    Sodium 141 135 - 144 mmol/L    Potassium 4.4 3.7 - 5.3 mmol/L    Chloride 101 98 - 107 mmol/L    CO2 19 (L) 20 - 31 mmol/L    Anion Gap 21 (H) 9 - 17 mmol/L    GFR Non-African American >60 >60 mL/min    GFR African American >60 >60 mL/min    GFR Comment          GFR Staging NOT REPORTED    MAGNESIUM    Collection Time: 08/13/20  1:15 AM   Result Value Ref Range    Magnesium 2.2 1.6 - 2.6 mg/dL   Troponin    Collection Time: 08/13/20  1:15 AM   Result Value Ref Range    Troponin, High Sensitivity 7 0 - 22 ng/L    Troponin T NOT REPORTED <0.03 ng/mL    Troponin Interp NOT REPORTED    TOX SCR, BLD, ED    Collection Time: 08/13/20  1:15 AM   Result Value Ref Range    Acetaminophen Level <5 (L) 10 - 30 ug/mL    Ethanol 70 (H) <10 mg/dL    Ethanol percent 0.070 (H) <7.284 %    Salicylate Lvl <1 (L) 3 - 10 mg/dL    Toxic Tricyclic Sc,Blood NEGATIVE NEGATIVE       Imaging/Diagnostics:  Xr Chest Portable    Result Date: 8/13/2020  Marginal inspiration, without evidence of acute cardiopulmonary disease       Assessment :      Hospital Problems           Last Modified POA    * (Principal) Hematemesis 8/13/2020 Yes    Alcohol abuse (Chronic) 8/13/2020 Yes    Anxiety 8/13/2020 Yes    Essential hypertension 8/13/2020 Yes    Severe major depression without psychotic features ([facility] [address] [address] 8/13/2020 Yes    Major depressive disorder, recurrent ([facility] [address] [facility] 8/13/2020 Yes          Plan:     Patient status inpatient in the Progressive Unit/Step down    1. Hematemesis- monitor HH, consult to GI service protonix gtt. 2. Alcohol abuse with withdrawal- previous history of complicated withdrawal with hallucinations and seizures, maintain seizure precautions, ciwa scale. Supportive treatment with thiamin, mvi. CiWA 12,   3. Anxiety / depression- continue with  Home medications, no si/hi at this time. 4. Essential hypertension- denies this as a history, on midodrine from home med list. Monitor blood pressures  5. GI proph  6. DVT proph- hold chemical a/c secondary to #1.        Consultations:   IP CONSULT TO HOSPITALIST  IP CONSULT TO SOCIAL WORK  IP CONSULT TO GI  IP CONSULT TO SOCIAL WORK  IP CONSULT TO GI     Patient is admitted as inpatient status because of co-morbidities listed above, severity of signs and symptoms as outlined, requirement for current medical therapies and most importantly because of direct risk to patient if care not provided in a hospital setting. Expected length of stay > 48 hours.     Aide Booth, EMANUEL - CNP  8/13/2020  4:28 AM    Copy sent to  . None (Inactive)

## 2020-08-13 NOTE — CARE COORDINATION
Case Management Initial Discharge Plan  Erlinda Bell,             Met with:patient to discuss discharge plans. Information verified: address, contacts, phone number, , insurance Yes    Emergency Contact/Next of Kin name & number:  Joaquin Marcum( sister) [phone]    PCP: . None (Inactive)  Date of last visit: does not have a PCP    Insurance Provider: [employer]    Discharge Planning    Living Arrangements:  Alone   Support Systems:  Family Members    Home: apartment stories  5 stairs to climb to get into front door,     Patient able to perform ADL's:Independent    Current Services (outpatient & in home) none  DME equipment: none  DME provider:     Receiving oral anticoagulation therapy? No    If indicated:   Physician managing anticoagulation treatment:   Where does patient obtain lab work for ATC treatment? Potential Assistance Needed:  N/A    Patient agreeable to home care: No  Rebuck of choice provided:  n/a    Prior SNF/Rehab Placement and Facility:   Agreeable to SNF/Rehab: No  Rebuck of choice provided: n/a     Evaluation: no    Expected Discharge date:  20    Patient expects to be discharged to:  return to home  Follow Up Appointment: Best Day/ Time:      Transportation provider: friend  Transportation arrangements needed for discharge: No, states a friend will transport. Readmission Risk              Risk of Unplanned Readmission:        13             Does patient have a readmission risk score greater than 14?: No  If yes, follow-up appointment must be made within 7 days of discharge.      Goals of Care: stop drinking        Discharge Plan: return to home          Electronically signed by Susannah Fonseca RN on 20 at 9:00 AM EDT

## 2020-08-13 NOTE — ED NOTES
Pt resting in bed, eyes closed. Even non labored RR. Will continue to monitor. Call light in reach.         Geetha Pedro RN  08/13/20 [id number]

## 2020-08-13 NOTE — CARE COORDINATION
Met with pt after receiving a social work consult for alcohol abuse. Pt is alert and oriented at the current time. He states that he has been through detox before and knows what to expect. He stated that he is feeling ok right now but stated that he just received some Ativan. Pt states that he is drinking 1/5 of liquor a day. He states that he wants to get help. He has been in [facility] in the past.  Pt provided a list of resources to review. Discussed some options. He wants to look over the list and SW will meet with him again tomorrow. Pt has Karsten.

## 2020-08-14 ENCOUNTER — APPOINTMENT (OUTPATIENT)
Dept: ULTRASOUND IMAGING | Age: 50
DRG: 242 | End: 2020-08-14
Payer: COMMERCIAL

## 2020-08-14 ENCOUNTER — ANESTHESIA (OUTPATIENT)
Dept: ENDOSCOPY | Age: 50
DRG: 242 | End: 2020-08-14
Payer: COMMERCIAL

## 2020-08-14 VITALS
SYSTOLIC BLOOD PRESSURE: 87 MMHG | OXYGEN SATURATION: 91 % | RESPIRATION RATE: 15 BRPM | DIASTOLIC BLOOD PRESSURE: 53 MMHG

## 2020-08-14 VITALS
TEMPERATURE: 97.8 F | OXYGEN SATURATION: 92 % | RESPIRATION RATE: 18 BRPM | WEIGHT: 163.14 LBS | SYSTOLIC BLOOD PRESSURE: 109 MMHG | DIASTOLIC BLOOD PRESSURE: 78 MMHG | HEIGHT: 69 IN | BODY MASS INDEX: 24.16 KG/M2 | HEART RATE: 75 BPM

## 2020-08-14 LAB
AFP: 6.5 UG/L
ALBUMIN SERPL-MCNC: 3.2 G/DL (ref 3.5–5.2)
ALBUMIN/GLOBULIN RATIO: 1.6 (ref 1–2.5)
ALP BLD-CCNC: 54 U/L (ref 40–129)
ALPHA-1 ANTITRYPSIN: 93 MG/DL (ref 90–200)
ALT SERPL-CCNC: 53 U/L (ref 5–41)
ANION GAP SERPL CALCULATED.3IONS-SCNC: 10 MMOL/L (ref 9–17)
AST SERPL-CCNC: 65 U/L
BILIRUB SERPL-MCNC: 0.46 MG/DL (ref 0.3–1.2)
BUN BLDV-MCNC: 12 MG/DL (ref 6–20)
BUN/CREAT BLD: ABNORMAL (ref 9–20)
CALCIUM IONIZED: 0.99 MMOL/L (ref 1.13–1.33)
CALCIUM SERPL-MCNC: 7.5 MG/DL (ref 8.6–10.4)
CHLORIDE BLD-SCNC: 109 MMOL/L (ref 98–107)
CO2: 22 MMOL/L (ref 20–31)
CREAT SERPL-MCNC: 0.65 MG/DL (ref 0.7–1.2)
ESTIMATED AVERAGE GLUCOSE: 100 MG/DL
FOLATE: 17.1 NG/ML
GFR AFRICAN AMERICAN: >60 ML/MIN
GFR NON-AFRICAN AMERICAN: >60 ML/MIN
GFR SERPL CREATININE-BSD FRML MDRD: ABNORMAL ML/MIN/{1.73_M2}
GFR SERPL CREATININE-BSD FRML MDRD: ABNORMAL ML/MIN/{1.73_M2}
GLUCOSE BLD-MCNC: 102 MG/DL (ref 75–110)
GLUCOSE BLD-MCNC: 105 MG/DL (ref 70–99)
HAV IGM SER IA-ACNC: NONREACTIVE
HBA1C MFR BLD: 5.1 % (ref 4–6)
HCT VFR BLD CALC: 40.2 % (ref 40.7–50.3)
HCT VFR BLD CALC: 42.1 % (ref 40.7–50.3)
HEMOGLOBIN: 12.9 G/DL (ref 13–17)
HEMOGLOBIN: 13.4 G/DL (ref 13–17)
HEPATITIS B CORE IGM ANTIBODY: NONREACTIVE
HEPATITIS B SURFACE ANTIGEN: NONREACTIVE
HEPATITIS C ANTIBODY: NONREACTIVE
INR BLD: 0.9
IRON SATURATION: 60 % (ref 20–55)
IRON: 161 UG/DL (ref 59–158)
LACTIC ACID, SEPSIS WHOLE BLOOD: 1.4 MMOL/L (ref 0.5–1.9)
LACTIC ACID, SEPSIS: NORMAL MMOL/L (ref 0.5–1.9)
MAGNESIUM: 2.1 MG/DL (ref 1.6–2.6)
MCH RBC QN AUTO: 32.8 PG (ref 25.2–33.5)
MCHC RBC AUTO-ENTMCNC: 32.1 G/DL (ref 28.4–34.8)
MCV RBC AUTO: 102.3 FL (ref 82.6–102.9)
NRBC AUTOMATED: 0 PER 100 WBC
PDW BLD-RTO: 12.7 % (ref 11.8–14.4)
PHOSPHORUS: 2.3 MG/DL (ref 2.5–4.5)
PLATELET # BLD: 212 K/UL (ref 138–453)
PMV BLD AUTO: 10.2 FL (ref 8.1–13.5)
POTASSIUM SERPL-SCNC: 3.9 MMOL/L (ref 3.7–5.3)
PROTHROMBIN TIME: 9.6 SEC (ref 9–12)
RBC # BLD: 3.93 M/UL (ref 4.21–5.77)
SODIUM BLD-SCNC: 141 MMOL/L (ref 135–144)
TOTAL IRON BINDING CAPACITY: 268 UG/DL (ref 250–450)
TOTAL PROTEIN: 5.2 G/DL (ref 6.4–8.3)
TSH SERPL DL<=0.05 MIU/L-ACNC: 0.42 MIU/L (ref 0.3–5)
UNSATURATED IRON BINDING CAPACITY: 107 UG/DL (ref 112–347)
VITAMIN B-12: 542 PG/ML (ref 232–1245)
WBC # BLD: 3.4 K/UL (ref 3.5–11.3)

## 2020-08-14 PROCEDURE — 83036 HEMOGLOBIN GLYCOSYLATED A1C: CPT

## 2020-08-14 PROCEDURE — 36415 COLL VENOUS BLD VENIPUNCTURE: CPT

## 2020-08-14 PROCEDURE — 82330 ASSAY OF CALCIUM: CPT

## 2020-08-14 PROCEDURE — 85610 PROTHROMBIN TIME: CPT

## 2020-08-14 PROCEDURE — 2500000003 HC RX 250 WO HCPCS: Performed by: NURSE ANESTHETIST, CERTIFIED REGISTERED

## 2020-08-14 PROCEDURE — 83605 ASSAY OF LACTIC ACID: CPT

## 2020-08-14 PROCEDURE — 80074 ACUTE HEPATITIS PANEL: CPT

## 2020-08-14 PROCEDURE — 82105 ALPHA-FETOPROTEIN SERUM: CPT

## 2020-08-14 PROCEDURE — 99232 SBSQ HOSP IP/OBS MODERATE 35: CPT | Performed by: INTERNAL MEDICINE

## 2020-08-14 PROCEDURE — 43235 EGD DIAGNOSTIC BRUSH WASH: CPT | Performed by: INTERNAL MEDICINE

## 2020-08-14 PROCEDURE — 3609017100 HC EGD: Performed by: INTERNAL MEDICINE

## 2020-08-14 PROCEDURE — 7100000011 HC PHASE II RECOVERY - ADDTL 15 MIN: Performed by: INTERNAL MEDICINE

## 2020-08-14 PROCEDURE — 85014 HEMATOCRIT: CPT

## 2020-08-14 PROCEDURE — 7100000010 HC PHASE II RECOVERY - FIRST 15 MIN: Performed by: INTERNAL MEDICINE

## 2020-08-14 PROCEDURE — 84443 ASSAY THYROID STIM HORMONE: CPT

## 2020-08-14 PROCEDURE — 84100 ASSAY OF PHOSPHORUS: CPT

## 2020-08-14 PROCEDURE — 3700000000 HC ANESTHESIA ATTENDED CARE: Performed by: INTERNAL MEDICINE

## 2020-08-14 PROCEDURE — 85018 HEMOGLOBIN: CPT

## 2020-08-14 PROCEDURE — 82947 ASSAY GLUCOSE BLOOD QUANT: CPT

## 2020-08-14 PROCEDURE — 6370000000 HC RX 637 (ALT 250 FOR IP): Performed by: INTERNAL MEDICINE

## 2020-08-14 PROCEDURE — 83540 ASSAY OF IRON: CPT

## 2020-08-14 PROCEDURE — C9113 INJ PANTOPRAZOLE SODIUM, VIA: HCPCS | Performed by: NURSE PRACTITIONER

## 2020-08-14 PROCEDURE — 6370000000 HC RX 637 (ALT 250 FOR IP): Performed by: NURSE PRACTITIONER

## 2020-08-14 PROCEDURE — 2580000003 HC RX 258: Performed by: NURSE ANESTHETIST, CERTIFIED REGISTERED

## 2020-08-14 PROCEDURE — 86038 ANTINUCLEAR ANTIBODIES: CPT

## 2020-08-14 PROCEDURE — 86255 FLUORESCENT ANTIBODY SCREEN: CPT

## 2020-08-14 PROCEDURE — 0DJ08ZZ INSPECTION OF UPPER INTESTINAL TRACT, VIA NATURAL OR ARTIFICIAL OPENING ENDOSCOPIC: ICD-10-PCS | Performed by: INTERNAL MEDICINE

## 2020-08-14 PROCEDURE — 87040 BLOOD CULTURE FOR BACTERIA: CPT

## 2020-08-14 PROCEDURE — 6360000002 HC RX W HCPCS: Performed by: NURSE ANESTHETIST, CERTIFIED REGISTERED

## 2020-08-14 PROCEDURE — 85027 COMPLETE CBC AUTOMATED: CPT

## 2020-08-14 PROCEDURE — 6360000002 HC RX W HCPCS: Performed by: NURSE PRACTITIONER

## 2020-08-14 PROCEDURE — 83735 ASSAY OF MAGNESIUM: CPT

## 2020-08-14 PROCEDURE — 3700000001 HC ADD 15 MINUTES (ANESTHESIA): Performed by: INTERNAL MEDICINE

## 2020-08-14 PROCEDURE — 80053 COMPREHEN METABOLIC PANEL: CPT

## 2020-08-14 PROCEDURE — 82746 ASSAY OF FOLIC ACID SERUM: CPT

## 2020-08-14 PROCEDURE — 83550 IRON BINDING TEST: CPT

## 2020-08-14 PROCEDURE — 76705 ECHO EXAM OF ABDOMEN: CPT

## 2020-08-14 PROCEDURE — 2580000003 HC RX 258: Performed by: NURSE PRACTITIONER

## 2020-08-14 PROCEDURE — 82103 ALPHA-1-ANTITRYPSIN TOTAL: CPT

## 2020-08-14 PROCEDURE — 82607 VITAMIN B-12: CPT

## 2020-08-14 PROCEDURE — 83516 IMMUNOASSAY NONANTIBODY: CPT

## 2020-08-14 RX ORDER — PANTOPRAZOLE SODIUM 40 MG/1
40 TABLET, DELAYED RELEASE ORAL
Status: DISCONTINUED | OUTPATIENT
Start: 2020-08-15 | End: 2020-08-14 | Stop reason: HOSPADM

## 2020-08-14 RX ORDER — GLYCOPYRROLATE 1 MG/5 ML
SYRINGE (ML) INTRAVENOUS PRN
Status: DISCONTINUED | OUTPATIENT
Start: 2020-08-14 | End: 2020-08-14 | Stop reason: SDUPTHER

## 2020-08-14 RX ORDER — SODIUM CHLORIDE 0.9 % (FLUSH) 0.9 %
10 SYRINGE (ML) INJECTION PRN
Status: DISCONTINUED | OUTPATIENT
Start: 2020-08-14 | End: 2020-08-14 | Stop reason: HOSPADM

## 2020-08-14 RX ORDER — SODIUM CHLORIDE 0.9 % (FLUSH) 0.9 %
10 SYRINGE (ML) INJECTION EVERY 12 HOURS SCHEDULED
Status: DISCONTINUED | OUTPATIENT
Start: 2020-08-14 | End: 2020-08-14 | Stop reason: HOSPADM

## 2020-08-14 RX ORDER — PROPOFOL 10 MG/ML
INJECTION, EMULSION INTRAVENOUS PRN
Status: DISCONTINUED | OUTPATIENT
Start: 2020-08-14 | End: 2020-08-14 | Stop reason: SDUPTHER

## 2020-08-14 RX ORDER — SODIUM CHLORIDE 9 MG/ML
INJECTION, SOLUTION INTRAVENOUS CONTINUOUS PRN
Status: DISCONTINUED | OUTPATIENT
Start: 2020-08-14 | End: 2020-08-14 | Stop reason: SDUPTHER

## 2020-08-14 RX ORDER — LIDOCAINE HYDROCHLORIDE 10 MG/ML
INJECTION, SOLUTION EPIDURAL; INFILTRATION; INTRACAUDAL; PERINEURAL PRN
Status: DISCONTINUED | OUTPATIENT
Start: 2020-08-14 | End: 2020-08-14 | Stop reason: SDUPTHER

## 2020-08-14 RX ADMIN — PROPOFOL 280 MG: 10 INJECTION, EMULSION INTRAVENOUS at 08:28

## 2020-08-14 RX ADMIN — BENZOCAINE AND MENTHOL 1 LOZENGE: 15; 3.6 LOZENGE ORAL at 11:40

## 2020-08-14 RX ADMIN — OCTREOTIDE ACETATE 50 MCG/HR: 500 INJECTION, SOLUTION INTRAVENOUS; SUBCUTANEOUS at 07:21

## 2020-08-14 RX ADMIN — LORAZEPAM 1 MG: 2 INJECTION INTRAMUSCULAR; INTRAVENOUS at 12:37

## 2020-08-14 RX ADMIN — Medication 0.2 MG: at 08:26

## 2020-08-14 RX ADMIN — LORAZEPAM 1 MG: 2 INJECTION INTRAMUSCULAR; INTRAVENOUS at 15:35

## 2020-08-14 RX ADMIN — SODIUM CHLORIDE: 9 INJECTION, SOLUTION INTRAVENOUS at 08:17

## 2020-08-14 RX ADMIN — SODIUM CHLORIDE: 9 INJECTION, SOLUTION INTRAVENOUS at 01:10

## 2020-08-14 RX ADMIN — LORAZEPAM 1 MG: 1 TABLET ORAL at 11:35

## 2020-08-14 RX ADMIN — SODIUM CHLORIDE 8 MG/HR: 9 INJECTION, SOLUTION INTRAVENOUS at 07:21

## 2020-08-14 RX ADMIN — LORAZEPAM 1 MG: 2 INJECTION INTRAMUSCULAR; INTRAVENOUS at 13:35

## 2020-08-14 RX ADMIN — ONDANSETRON 4 MG: 2 INJECTION INTRAMUSCULAR; INTRAVENOUS at 12:15

## 2020-08-14 RX ADMIN — LIDOCAINE HYDROCHLORIDE 25 MG: 10 INJECTION, SOLUTION EPIDURAL; INFILTRATION; INTRACAUDAL; PERINEURAL at 08:26

## 2020-08-14 ASSESSMENT — PAIN SCALES - GENERAL
PAINLEVEL_OUTOF10: 0

## 2020-08-14 ASSESSMENT — LIFESTYLE VARIABLES: SMOKING_STATUS: 0

## 2020-08-14 ASSESSMENT — PAIN - FUNCTIONAL ASSESSMENT: PAIN_FUNCTIONAL_ASSESSMENT: 0-10

## 2020-08-14 NOTE — PLAN OF CARE
GI update:    Pt may be discharged this afternoon after additional labs have been drawn and liver US has been obtained-no need to keep pt for results as some labs may take several days.     Pt to follow up in office with Dr. Sofía Swift in 2 weeks  Joie Rosen, CNP

## 2020-08-14 NOTE — CARE COORDINATION
Called to follow up with [facility] and they still have not reviewed the paperwork. [facility] earlier and left a message. Called [facility] back and they are full. Called [facility] and spoke to intake. They have beds available. Faxed intake information and they will discuss with their doctor to determine if pt is accepted and call back. Addendum:  [facility] called back and are full and are not taking weekend admissions. If [facility] cannot accept, pt could go to walk in clinic at   [facility] on Monday morning between 8am-1pm.  Awaiting [facility] decision. Addendum:  [facility] called back and pt was just discharged from there last week and doctor will not accept back, suggested that he go to outpatient as recommended. Met with pt to inform of this decision and he stated that he is homeless. Offered a homeless shelter and he stated that he would rather stay on the streets. Pt stated that he wanted to get back to [place]. Provided a cab for pt to the [facility] in [place] as requested.

## 2020-08-14 NOTE — PROGRESS NOTES
Roselia [facility]ines 18    Progress Note    8/14/2020    9:54 AM    Name:   Dilip Gupta  MRN:     [id number]     Acct:      [de-identified]   Room:   [address] [place] [address] Day:  1  Admit Date:  8/13/2020 12:35 AM    PCP:   . None (Inactive)  Code Status:  Full Code    Subjective:     C/C:   Chief Complaint   Patient presents with    Hematemesis     vomiting since this morning. [place] off the wagon and started dinking again. statesl last drink was 6 hours prior to arrival. drank a 5th     Interval History Status: not changed. No issues overnight  EGD this AM  No other complaints     Brief History:     45 M with history of alcohol abuse who presented with hematemesis. States he multiple episodes of vomiting this AM after drinking. Normally drinks 1/5 vodka per day. States he has gone through withdrawals previously. Interested in quitting and going to alcohol rehab at this time. Review of Systems:     Constitutional:  negative for chills, fevers, sweats  Respiratory:  negative for cough, dyspnea on exertion, shortness of breath, wheezing  Cardiovascular:  negative for chest pain, chest pressure/discomfort  Gastrointestinal:  negative for abdominal pain, constipation, diarrhea, nausea, vomiting  Neurological:  negative for dizziness, headache    Medications: Allergies:     Allergies   Allergen Reactions    Pcn [Penicillins] Rash       Current Meds:   Scheduled Meds:    sodium chloride flush  10 mL Intravenous 2 times per day    [facility] Hold] sodium chloride flush  10 mL Intravenous 2 times per day    [MAR Hold] thiamine  100 mg Intravenous Daily    [MAR Hold] multivitamin  1 tablet Oral Daily    [MAR Hold] buPROPion  150 mg Oral QAM    [MAR Hold] QUEtiapine  300 mg Oral Nightly    [MAR Hold] sodium chloride flush  10 mL Intravenous 2 times per day     Continuous Infusions:    [MAR Hold] sodium chloride 125 mL/hr at 08/14/20 0110    [MAR Hold] Av °F (36.7 °C), Min:97.2 °F (36.2 °C), Max:98.5 °F (36.9 °C)    Recent Labs     20  1757   POCGLU 95       I/O (24Hr):     Intake/Output Summary (Last 24 hours) at 2020 0954  Last data filed at 2020 0920  Gross per 24 hour   Intake 3098 ml   Output 200 ml   Net 2898 ml       Labs:  Hematology:  Recent Labs     20  0650   WBC 10.0  --  3.4*   RBC 4.55  --  3.93*   HGB 14.7 13.1 12.9*   HCT 45.0 40.0* 40.2*   MCV 98.9  --  102.3   MCH 32.3  --  32.8   MCHC 32.7  --  32.1   RDW 13.2  --  12.7     --  212   MPV 10.5  --  10.2   INR 0.9  --  0.9     Chemistry:  Recent Labs     20  0650     --  141   K 4.4  --  3.9     --  109*   CO2 19*  --  22   GLUCOSE 109*  --  105*   BUN 19  --  12   CREATININE 0.60*  --  0.65*   MG 2.2  --  2.1   ANIONGAP 21*  --  10   LABGLOM >60  --  >60   GFRAA >60  --  >60   CALCIUM 8.7  --  7.5*   CAION  --   --  0.99*   PHOS  --   --  2.3*   TROPHS 7 6  --      Recent Labs     20  0650   PROT 7.5  --  5.2*   LABALBU 4.5  --  3.2*   TSH  --   --  0.42   AST 22  --  65*   ALT 18  --  53*   ALKPHOS 59  --  54   BILITOT 0.25*  --  0.46   BILIDIR <0.08  --   --    POCGLU  --  95  --      ABG:No results found for: POCPH, PHART, PH, POCPCO2, IKB7MTP, PCO2, POCPO2, PO2ART, PO2, POCHCO3, DHI7PIN, HCO3, NBEA, PBEA, BEART, BE, THGBART, THB, TTW2XEV, WDPL3TWZ, O5RYJQCE, O2SAT, FIO2  Lab Results   Component Value Date/Time    SPECIAL LT HAND 2ML 06/15/2019 02:03 PM     Lab Results   Component Value Date/Time    CULTURE NO GROWTH 6 DAYS 06/15/2019 02:03 PM       Radiology:  [facility]idee Bodily Chest Portable    Result Date: 2020  Marginal inspiration, without evidence of acute cardiopulmonary disease       Physical Examination:        General appearance:  alert, cooperative and no distress  Mental Status:  oriented to person, place and time and normal affect  Lungs:  clear to auscultation bilaterally, normal effort  Heart:  regular rate and rhythm  Abdomen:  soft, nontender, nondistended, normal bowel sounds  Extremities:  no edema, redness, tenderness in the calves  Skin:  no gross lesions, rashes, induration    Assessment:        Hospital Problems           Last Modified POA    * (Principal) Hematemesis 8/13/2020 Yes    Alcohol abuse (Chronic) 8/13/2020 Yes    Anxiety 8/13/2020 Yes    Essential hypertension 8/13/2020 Yes    [facility] 8/13/2020 Yes    Major depressive disorder, recurrent [facility] 8/13/2020 Yes          Plan:        - Vitals, labs, imaging, medications reviewed   - GI consulted - s/p EGD (8/14): varices, healed MW tear   - Monitor emesis, monitor Hgb  - Continue PPI  - CIWA protocol  - Seizure precautions  - Continue home medications   - Social work evaluation   - DC planning to alcohol rehab       Loraine Cotto MD  8/14/2020  9:54 AM

## 2020-08-14 NOTE — CARE COORDINATION
Met with pt to discuss alcohol rehab. Pt stated that he wants to go to [facility] in [place]. Called [facility] and left a message. Was told by reception that she believes the [facility] is full. Explained to pt and he is agreeable to a referral to [facility]. Called [facility] and spoke to Candace. They are full right this moment but he stated that it changes hourly. He requested some medical information be faxed to the nurse Charlotte Arroyo. She will review and determine if he is a candidate for their rehab. Information faxed.

## 2020-08-14 NOTE — OP NOTE
Operative Note  PROCEDURE NOTE    DATE OF PROCEDURE: 8/14/2020     SURGEON: Scooter Galvez MD  Facility: [facility]  ASSISTANT: None  Anesthesia: MAC   PREOPERATIVE DIAGNOSIS:   hematemesis   ETOH   Hx esophagitis     Diagnosis:  Dilated and hypoactive esophagus, with engorged veins not at the level of forming varices , and they are in proximal esophagus more ( less likely  portal HTN related)   whit coated ulcer at GE junction ? Healed MW tear     No blood old or fresh       POSTOPERATIVE DIAGNOSIS: As described below    OPERATION: Upper GI endoscopy with Biopsy    ANESTHESIA: Moderate Sedation     ESTIMATED BLOOD LOSS: Less than 50 ml    COMPLICATIONS: None. SPECIMENS:  Was Not Obtained    HISTORY: The patient is a 45y.o. year old male with history of above preop diagnosis. I recommended esophagogastroduodenoscopy with possible biopsy and I explained the risk, benefits, expected outcome, and alternatives to the procedure. Risks included but are not limited to bleeding, infection, respiratory distress, hypotension, and perforation of the esophagus, stomach, or duodenum. Patient understands and is in agreement. The patient was counseled at length about the risks of wayne Covid-19 during their perioperative period and any recovery window from their procedure. The patient was made aware that wayne Covid-19  may worsen their prognosis for recovering from their procedure  and lend to a higher morbidity and/or mortality risk. All material risks, benefits, and reasonable alternatives including postponing the procedure were discussed. The patient does wish to proceed with the procedure at this time. PROCEDURE: The patient was given IV conscious sedation. The patient's SPO2 remained above 90% throughout the procedure. The gastroscope was inserted orally and advanced under direct vision through the esophagus, through the stomach, through the pylorus, and into the descending duodenum. Post sedation note : The patient's SPO2 remained above 90% throughout the procedure. the vital signs remained stable , and no immediate complication form the procedure noted, patient will be ready for d/c when criteria is met . Findings:    Retropharyngeal area was grossly normal appearing    Esophagus: abnormal: Dilated and hypoactive esophagus, with engorged veins not at the level of forming varices , and they are in proximal esophagus more ( less likely  portal HTN related)   whit coated ulcer at GE junction ? Healed MW tear     No blood old or fresh     Small H H     Stomach:    Fundus: normal    Body: normal    Antrum: normal    Duodenum:     Descending: normal    Bulb: normal    The scope was removed and the patient tolerated the procedure well. Recommendations/Plan:   1. Bleeding wise pt satble and can be d/c to follow out p[t  2. PPI  3.  liver dx w/u   4. F/U In Office in 3-4 weeks  5.  Discussed with the family    Electronically signed by Rajeev Camargo MD  on 8/14/2020 at 8:46 AM

## 2020-08-14 NOTE — CARE COORDINATION
Discharge order noted. SW working on inpatient alcohol treatment. Will arrange transport, if needed. 1640 - Patient to discharge home.      Discharge [facility] Case Management Department  Written by: Hanny Abdul RN    Patient Name: Thi Gillespie  Attending Provider: Morales Mobley MD  Admit Date: 2020 12:35 AM  MRN: [id number]  Account: [de-identified]                     : 1970  Discharge Date:  20       Disposition: home    Hanny Abdul RN

## 2020-08-14 NOTE — ANESTHESIA PRE PROCEDURE
Department of Anesthesiology  Preprocedure Note       Name:  More Wu   Age:  45 y.o.  :  1970                                          MRN:  [id number]         Date:  2020      Surgeon: Nancy Willams):  Vania Wheeler MD    Procedure: Procedure(s):  EGD DIAGNOSTIC ONLY    Medications prior to admission:   Prior to Admission medications    Medication Sig Start Date End Date Taking?  Authorizing Provider   QUEtiapine (SEROQUEL) 300 MG tablet Take 1 tablet by mouth nightly 4/15/19  Yes Eva Costa MD   nicotine (NICODERM CQ) 21 MG/24HR Place 1 patch onto the skin daily as needed (if patient is a smoker and requests nicotine replacemnt therapy) 20   Viva Medin   fluticasone [facility]) 50 MCG/ACT nasal spray 2 sprays by Nasal route daily  Patient not taking: Reported on 2019   EMANUEL Pedroza - CNP   ondansetron (ZOFRAN) 4 MG tablet Take 1 tablet by mouth every 8 hours as needed for Nausea or Vomiting  Patient not taking: Reported on 2019   Karla Pichardo MD   folic acid (FOLVITE) 1 MG tablet Take 1 tablet by mouth daily  Patient not taking: Reported on 2019   Karla Pichardo MD   midodrine (PROAMATINE) 5 MG tablet Take 1 tablet by mouth 3 times daily (with meals) Hold if systolic blood pressure is more than 110  Patient not taking: Reported on 2019   Karla Pichardo MD   vitamin B-1 100 MG tablet Take 1 tablet by mouth daily  Patient not taking: Reported on 2019   Karla Pichardo MD   buPROPion (WELLBUTRIN XL) 150 MG extended release tablet Take 150 mg by mouth every morning    Historical Provider, MD   Multiple Vitamin (MULTIVITAMIN) tablet Take 1 tablet by mouth daily  Patient not taking: Reported on 2019   Laquita Hope DO   pantoprazole (PROTONIX) 40 MG tablet Take 1 tablet by mouth every morning (before breakfast)  Patient not taking: Reported on 2019   Laquita Hope DO   traZODone (DESYREL) 50 MG tablet Take 1 tablet by mouth nightly as needed for Sleep 4/15/19   Macy Garcia MD       Current medications:    Current Facility-Administered Medications   Medication Dose Route Frequency Provider Last Rate Last Dose    [MAR Hold] sodium chloride flush 0.9 % injection 10 mL  10 mL Intravenous 2 times per day Keri Purdy DO        [MAR Hold] sodium chloride flush 0.9 % injection 10 mL  10 mL Intravenous PRN Keri Richardsons, DO        [MAR Hold] thiamine (B-1) injection 100 mg  100 mg Intravenous Daily Jen , APRN - CNP        [MAR Hold] multivitamin 1 tablet  1 tablet Oral Daily Jen , APRN - CNP        [MAR Hold] buPROPion (WELLBUTRIN XL) extended release tablet 150 mg  150 mg Oral QAM Jen , APRN - CNP   150 mg at 08/13/20 1200    [MAR Hold] nicotine (NICODERM CQ) 21 MG/24HR 1 patch  1 patch Transdermal Daily PRN Jen , APRN - CNP        [MAR Hold] QUEtiapine (SEROQUEL) tablet 300 mg  300 mg Oral Nightly Jen , APRN - CNP   300 mg at 08/13/20 2110    [MAR Hold] 0.9 % sodium chloride infusion   Intravenous Continuous Jen , APRN -  mL/hr at 08/14/20 0110      [MAR Hold] sodium chloride flush 0.9 % injection 10 mL  10 mL Intravenous 2 times per day Jen , APRN - CNP   10 mL at 08/13/20 2111    [MAR Hold] sodium chloride flush 0.9 % injection 10 mL  10 mL Intravenous PRN Jen , APRN - CNP        [facility] Hold] potassium chloride (KLOR-CON M) extended release tablet 40 mEq  40 mEq Oral PRN Jen , APRN - CNP        Or   [facility] Hold] potassium bicarb-citric acid (EFFER-K) effervescent tablet 40 mEq  40 mEq Oral PRN Jen , APRN - CNP        Or   [facility] Hold] potassium chloride 10 mEq/100 mL IVPB (Peripheral Line)  10 mEq Intravenous PRN Jen , APRN - CNP        [MAR Hold] acetaminophen (TYLENOL) tablet 650 mg  650 mg Oral Q6H PRN Keri Perdomo Ana Maria, EMANUEL - CNP        Or    [MAR Hold] acetaminophen (TYLENOL) suppository 650 mg  650 mg Rectal Q6H PRN [facility] , APRN - [facility]        [MAR Hold] polyethylene glycol (GLYCOLAX) packet 17 g  17 g Oral Daily [facility]        [MAR Hold] promethazine (PHENERGAN) tablet 12.5 mg  12.5 mg Oral Q6H PRN [facility] , APRN - CNP        Or    [MAR Hold] on[facility]ansetron [facility]) injection 4 mg  4 mg Intravenous Q6H PRN Arlina , APRN - [facility]        [MAR Hold] LORazepam (ATIVAN) tablet 1 mg  1 mg Oral Q1H PRN [facility] EMANUEL - CNP        Or    [MAR Hold] LORazepam (ATIVAN) injection 1 mg  1 mg Intravenous Q1H PRN [facility] , APRN - CNP        Or    [MAR Hold] LORazepam (ATIVAN) tablet 2 mg  2 mg Oral Q1H PRN [facility] EMANUEL - CNP        Or    [MAR Hold] LORazepam (ATIVAN) injection 2 mg  2 mg Intravenous Q1H PRN Arlina EMANUEL - CNP        Or    [MAR Hold] LORazepam (ATIVAN) tablet 3 mg  3 mg Oral Q1H PRN [facility] EMANUEL - CNP   3 mg at 08/13/20 1200    Or    [MAR Hold] LORazepam (ATIVAN) injection 3 mg  3 mg Intravenous Q1H PRN [facility] , APRN - CNP        Or    [MAR Hold] LORazepam (ATIVAN) tablet 4 mg  4 mg Oral Q1H PRN [facility] EMANUEL - CNP        Or    [MAR Hold] LORazepam (ATIVAN) injection 4 mg  4 mg Intravenous Q1H PRN [facility]  APRKENJI - [facility]        [MAR Hold] pantoprazole (PROTONIX) 80 mg in sodium chloride 0.9 % 100 mL infusion  8 mg/hr Intravenous Continuous EMANUEL Ordaz CNP 10 mL/hr at 08/14/20 0721 8 mg/hr at 08/14/20 0721    [MAR Hold] octreotide (SANDOSTATIN) 500 mcg in sodium chloride 0.9 % 100 mL infusion  50 mcg/hr Intravenous Continuous EMANUEL Betancur CNP 10 mL/hr at 08/14/20 0721 50 mcg/hr at 08/14/20 0721       Allergies:     Allergies   Allergen Reactions    Pcn [Penicillins] Rash       Problem List:    Patient Active Problem List   Diagnosis Code  Hematemesis K92.0    Alcohol abuse F10.10    Anxiety F41.9    Essential hypertension I10    Severe major depression without psychotic features ([facility] F32.2    Major depressive disorder, recurrent ([facility] F33.9       Past Medical History:        Diagnosis Date    Acute superficial gastritis without hemorrhage 5/30/2019    Alcohol abuse 6/5/2014    Coffee ground emesis     Duodenitis 5/30/2019    Esophagitis 6/7/2014    GI bleed 6/5/2014    Hematemesis 6/5/2014    Hepatic steatosis 5/29/2019    Hypertension     Per patient BP only increases when he drinks alcohol.     Intractable vomiting 5/3/2963    Metabolic acidosis, increased anion gap 5/29/2019    Severe major depression ([facility] 4/10/2019    Suicidal behavior 12/13/2018    Upper GI bleed 12/4/2019       Past Surgical History:        Procedure Laterality Date    HERNIA REPAIR      left    HERNIA REPAIR Right 5/16/2019    XI ROBOTIC LAPAROSCOPIC  INGUINAL HERNIA REPAIR WITH MESH, POSSIBLE BILATERAL, POSSIBLE OPEN performed by Charla Hills IV, DO at [place] [address]  6-5-14    gastritis, esophagitis, ulcers-koko    UPPER GASTROINTESTINAL ENDOSCOPY N/A 5/30/2019    EGD BIOPSY performed by Brice Funes MD at [place] [address] N/A 12/5/2019    EGD performed by Gigi Fields MD at [place] [address] History:    Social History     Tobacco Use    Smoking status: Never Smoker    Smokeless tobacco: Never Used    Tobacco comment: pt is a nonsmioker   Substance Use Topics    Alcohol use: Yes     Comment: a fifth a day vodka/day                                Counseling given: Not Answered  Comment: pt is a nonsmioker      Vital Signs (Current):   Vitals:    08/14/20 0345 08/14/20 0515 08/14/20 0715 08/14/20 0741   BP: 103/71  100/77 126/89   Pulse: 61  86 68   Resp: 11  18 13   Temp: 36.8 °C (98.2 °F)  36.7 °C (98 °F) 36.4 °C (97.5 °F)   TempSrc: Temporal  Oral Temporal   SpO2: 94% 95% 94%   Weight:  163 lb 2.3 oz (74 kg)     Height:                                                  BP Readings from Last 3 Encounters:   08/14/20 126/89   07/22/20 111/69   05/22/20 130/78       NPO Status: Time of last liquid consumption: 2100                        Time of last solid consumption: 1800                        Date of last liquid consumption: 08/13/20                        Date of last solid food consumption: 08/12/20    BMI:   Wt Readings from Last 3 Encounters:   08/14/20 163 lb 2.3 oz (74 kg)   07/22/20 159 lb 12.8 oz (72.5 kg)   05/22/20 160 lb 1.6 oz (72.6 kg)     Body mass index is 24.09 kg/m².     CBC:   Lab Results   Component Value Date    WBC 3.4 08/14/2020    RBC 3.93 08/14/2020    HGB 12.9 08/14/2020    HCT 40.2 08/14/2020    .3 08/14/2020    RDW 12.7 08/14/2020     08/14/2020       CMP:   Lab Results   Component Value Date     08/14/2020    K 3.9 08/14/2020     08/14/2020    CO2 22 08/14/2020    BUN 12 08/14/2020    CREATININE 0.65 08/14/2020    GFRAA >60 08/14/2020    LABGLOM >60 08/14/2020    GLUCOSE 105 08/14/2020    PROT 5.2 08/14/2020    CALCIUM 7.5 08/14/2020    BILITOT 0.46 08/14/2020    ALKPHOS 54 08/14/2020    AST 65 08/14/2020    ALT 53 08/14/2020       POC Tests:   Recent Labs     08/13/20  1757   POCGLU 95       Coags:   Lab Results   Component Value Date    PROTIME 9.6 08/14/2020    INR 0.9 08/14/2020    APTT 24.2 08/13/2020       HCG (If Applicable): No results found for: PREGTESTUR, PREGSERUM, HCG, HCGQUANT     ABGs: No results found for: PHART, PO2ART, DCZ0CUZ, HQS3YUV, BEART, P8TSFHBA     Type & Screen (If Applicable):  No results found for: LABABO, LABRH    Drug/Infectious Status (If Applicable):  Lab Results   Component Value Date    HEPCAB NONREACTIVE 05/29/2019       COVID-19 Screening (If Applicable):   Lab Results   Component Value Date    COVID19 Not Detected 08/13/2020         Anesthesia Evaluation  Patient summary reviewed no history of anesthetic complications:   Airway: Mallampati: II  TM distance: >3 FB   Neck ROM: full  Mouth opening: > = 3 FB Dental: normal exam         Pulmonary:       (-) recent URI and not a current smoker                           Cardiovascular:  Exercise tolerance: good (>4 METS),       (-) past MI and CABG/stent    ECG reviewed  Rhythm: regular  Rate: normal  Echocardiogram reviewed         Beta Blocker:  Not on Beta Blocker         Neuro/Psych:   (+) psychiatric history:   (-) seizures           GI/Hepatic/Renal:   (+) GERD: poorly controlled, liver disease:,           Endo/Other:        (-) diabetes mellitus, hypothyroidism, hyperthyroidism               Abdominal:           Vascular:     - DVT and PE. Anesthesia Plan      TIVA and MAC     ASA 3       Induction: intravenous. Anesthetic plan and risks discussed with patient. Use of blood products discussed with patient whom consented to blood products. Plan discussed with attending.                   EMANUEL Bee - CRNA   8/14/2020

## 2020-08-15 NOTE — DISCHARGE SUMMARY
Roselia Martines 18    Discharge Summary     Patient ID: Dominik Higgins  :  1970   MRN: [id number]     ACCOUNT:  [de-identified]   Patient's PCP: . None (Inactive)  Admit Date: 2020   Discharge Date: 2020  Length of Stay: 1  Code Status:  Prior  Admitting Physician: Apolinar Galan MD  Discharge Physician: Apolinar Galan MD     Active Discharge Diagnoses:     Hospital Problem Lists:  Principal Problem:    Hematemesis  Active Problems:    Alcohol abuse    Anxiety    Essential hypertension    Severe major depression without psychotic features ([facility]    Major depressive disorder, recurrent ([facility]  Resolved Problems:    * No resolved hospital problems. *      Admission Condition:  fair     Discharged Condition: stable    Hospital Stay:     Hospital Course:    45 M with history of alcohol abuse who presented with hematemesis. States he had multiple episodes of vomiting this AM after drinking. Normally drinks 1/5 vodka per day. States he has gone through withdrawals previously. Interested in quitting and going to alcohol rehab at this time. Underwent scope per GI, no active bleeding noted. Hgb remained stable. DC to alcohol rehab. Significant therapeutic interventions:     EGD    Findings:     Retropharyngeal area was grossly normal appearing     Esophagus: abnormal: Dilated and hypoactive esophagus, with engorged veins not at the level of forming varices , and they are in proximal esophagus more ( less likely  portal HTN related)   whit coated ulcer at GE junction ?  Healed MW tear      No blood old or fresh      Small H H      Stomach:    Fundus: normal    Body: normal    Antrum: normal     Duodenum:     Descending: normal    Bulb: normal    Significant Diagnostic Studies:   Labs / Micro:  CBC:   Lab Results   Component Value Date    WBC 3.4 2020    RBC 3.93 2020    HGB 13.4 2020    HCT 42.1 2020    .3 2020 MCH 32.8 08/14/2020    MCHC 32.1 08/14/2020    RDW 12.7 08/14/2020     08/14/2020     BMP:    Lab Results   Component Value Date    GLUCOSE 105 08/14/2020     08/14/2020    K 3.9 08/14/2020     08/14/2020    CO2 22 08/14/2020    ANIONGAP 10 08/14/2020    BUN 12 08/14/2020    CREATININE 0.65 08/14/2020    BUNCRER NOT REPORTED 08/14/2020    CALCIUM 7.5 08/14/2020    LABGLOM >60 08/14/2020    GFRAA >60 08/14/2020    GFR      08/14/2020    GFR NOT REPORTED 08/14/2020     HFP:    Lab Results   Component Value Date    PROT 5.2 08/14/2020     CMP:    Lab Results   Component Value Date    GLUCOSE 105 08/14/2020     08/14/2020    K 3.9 08/14/2020     08/14/2020    CO2 22 08/14/2020    BUN 12 08/14/2020    CREATININE 0.65 08/14/2020    ANIONGAP 10 08/14/2020    ALKPHOS 54 08/14/2020    ALT 53 08/14/2020    AST 65 08/14/2020    BILITOT 0.46 08/14/2020    LABALBU 3.2 08/14/2020    ALBUMIN 1.6 08/14/2020    LABGLOM >60 08/14/2020    GFRAA >60 08/14/2020    GFR      08/14/2020    GFR NOT REPORTED 08/14/2020    PROT 5.2 08/14/2020    CALCIUM 7.5 08/14/2020     PT/INR:    Lab Results   Component Value Date    PROTIME 9.6 08/14/2020    INR 0.9 08/14/2020     PTT:   Lab Results   Component Value Date    APTT 24.2 08/13/2020     FLP:    Lab Results   Component Value Date    CHOL 195 04/11/2019    TRIG 62 04/11/2019    HDL 52 04/11/2019     U/A:    Lab Results   Component Value Date    COLORU NOT REPORTED 12/10/2019    TURBIDITY NOT REPORTED 12/10/2019    SPECGRAV 1.020 12/10/2019    HGBUR NEGATIVE 12/10/2019    PHUR 6.0 12/10/2019    PROTEINU NEGATIVE 12/10/2019    GLUCOSEU NEGATIVE 12/10/2019    KETUA 1+ 12/10/2019    BILIRUBINUR NEGATIVE 12/10/2019    UROBILINOGEN Normal 12/10/2019    NITRU NEGATIVE 12/10/2019    LEUKOCYTESUR NEGATIVE 12/10/2019     TSH:    Lab Results   Component Value Date    TSH 0.42 08/14/2020        Radiology:  Xr Chest Portable    Result Date: 8/13/2020  Marginal inspiration, Nausea or Vomiting     pantoprazole 40 MG tablet  Commonly known as:  PROTONIX  Take 1 tablet by mouth every morning (before breakfast)     QUEtiapine 300 MG tablet  Commonly known as:  SEROQUEL  Take 1 tablet by mouth nightly     thiamine 100 MG tablet  Take 1 tablet by mouth daily     traZODone 50 MG tablet  Commonly known as:  DESYREL  Take 1 tablet by mouth nightly as needed for Sleep            No discharge procedures on file. Time Spent on discharge is  32 mins in patient examination, evaluation, counseling as well as medication reconciliation, prescriptions for required medications, discharge plan and follow up.     Electronically signed by   Denilson Rachel MD  8/15/2020  6:59 PM

## 2020-08-16 LAB
MITOCHONDRIAL ANTIBODY: 2.2 UNITS (ref 0–24.9)
SMOOTH MUSCLE ANTIBODY: 4 UNITS (ref 0–19)

## 2020-08-17 LAB
ANTI-NUCLEAR ANTIBODY (ANA): NEGATIVE
SMOOTH MUSCLE ANTIBODY: NORMAL

## 2020-08-20 LAB
CULTURE: NORMAL
Lab: NORMAL
SPECIMEN DESCRIPTION: NORMAL

## 2020-10-14 ENCOUNTER — HOSPITAL ENCOUNTER (OUTPATIENT)
Dept: GENERAL RADIOLOGY | Age: 50
Discharge: HOME | End: 2020-10-16
Payer: COMMERCIAL

## 2020-10-14 ENCOUNTER — HOSPITAL ENCOUNTER (OUTPATIENT)
Age: 50
Discharge: HOME | End: 2020-10-16
Payer: COMMERCIAL

## 2020-10-14 ENCOUNTER — HOSPITAL ENCOUNTER (OUTPATIENT)
Age: 50
Discharge: HOME | End: 2020-10-14
Payer: COMMERCIAL

## 2020-10-14 LAB
ABSOLUTE EOS #: 0.14 K/UL (ref 0–0.44)
ABSOLUTE IMMATURE GRANULOCYTE: <0.03 K/UL (ref 0–0.3)
ABSOLUTE LYMPH #: 1.13 K/UL (ref 1.1–3.7)
ABSOLUTE MONO #: 0.48 K/UL (ref 0.1–1.2)
ALBUMIN SERPL-MCNC: 4.3 G/DL (ref 3.5–5.2)
ALBUMIN/GLOBULIN RATIO: 1.8 (ref 1–2.5)
ALP BLD-CCNC: 57 U/L (ref 40–129)
ALT SERPL-CCNC: 9 U/L (ref 5–41)
ANION GAP SERPL CALCULATED.3IONS-SCNC: 10 MMOL/L (ref 9–17)
AST SERPL-CCNC: 13 U/L
BASOPHILS # BLD: 1 % (ref 0–2)
BASOPHILS ABSOLUTE: 0.03 K/UL (ref 0–0.2)
BILIRUB SERPL-MCNC: 0.29 MG/DL (ref 0.3–1.2)
BILIRUBIN URINE: NEGATIVE
BUN BLDV-MCNC: 16 MG/DL (ref 6–20)
BUN/CREAT BLD: ABNORMAL (ref 9–20)
CALCIUM SERPL-MCNC: 9.4 MG/DL (ref 8.6–10.4)
CHLORIDE BLD-SCNC: 106 MMOL/L (ref 98–107)
CHOLESTEROL, FASTING: 195 MG/DL
CHOLESTEROL/HDL RATIO: 4.5
CO2: 25 MMOL/L (ref 20–31)
COLOR: YELLOW
COMMENT UA: NORMAL
CREAT SERPL-MCNC: 1.09 MG/DL (ref 0.7–1.2)
DIFFERENTIAL TYPE: ABNORMAL
EOSINOPHILS RELATIVE PERCENT: 2 % (ref 1–4)
GFR AFRICAN AMERICAN: >60 ML/MIN
GFR NON-AFRICAN AMERICAN: >60 ML/MIN
GFR SERPL CREATININE-BSD FRML MDRD: ABNORMAL ML/MIN/{1.73_M2}
GFR SERPL CREATININE-BSD FRML MDRD: ABNORMAL ML/MIN/{1.73_M2}
GLUCOSE BLD-MCNC: 75 MG/DL (ref 70–99)
GLUCOSE URINE: NEGATIVE
HCT VFR BLD CALC: 41.1 % (ref 40.7–50.3)
HDLC SERPL-MCNC: 43 MG/DL
HEMOGLOBIN: 13.6 G/DL (ref 13–17)
IMMATURE GRANULOCYTES: 0 %
KETONES, URINE: NEGATIVE
LDL CHOLESTEROL: 125 MG/DL (ref 0–130)
LEUKOCYTE ESTERASE, URINE: NEGATIVE
LYMPHOCYTES # BLD: 18 % (ref 24–43)
MCH RBC QN AUTO: 32.3 PG (ref 25.2–33.5)
MCHC RBC AUTO-ENTMCNC: 33.1 G/DL (ref 28.4–34.8)
MCV RBC AUTO: 97.6 FL (ref 82.6–102.9)
MONOCYTES # BLD: 8 % (ref 3–12)
NITRITE, URINE: NEGATIVE
NRBC AUTOMATED: 0 PER 100 WBC
PDW BLD-RTO: 12.5 % (ref 11.8–14.4)
PH UA: 8 (ref 5–8)
PLATELET # BLD: 249 K/UL (ref 138–453)
PLATELET ESTIMATE: ABNORMAL
PMV BLD AUTO: 10.5 FL (ref 8.1–13.5)
POTASSIUM SERPL-SCNC: 4.2 MMOL/L (ref 3.7–5.3)
PROTEIN UA: NEGATIVE
RBC # BLD: 4.21 M/UL (ref 4.21–5.77)
RBC # BLD: ABNORMAL 10*6/UL
SEG NEUTROPHILS: 71 % (ref 36–65)
SEGMENTED NEUTROPHILS ABSOLUTE COUNT: 4.5 K/UL (ref 1.5–8.1)
SODIUM BLD-SCNC: 141 MMOL/L (ref 135–144)
SPECIFIC GRAVITY UA: 1.02 (ref 1–1.03)
TOTAL PROTEIN: 6.7 G/DL (ref 6.4–8.3)
TRIGLYCERIDE, FASTING: 133 MG/DL
TSH SERPL DL<=0.05 MIU/L-ACNC: 1.29 MIU/L (ref 0.3–5)
TURBIDITY: CLEAR
URINE HGB: NEGATIVE
UROBILINOGEN, URINE: NORMAL
VITAMIN D 25-HYDROXY: 19.1 NG/ML (ref 30–100)
VLDLC SERPL CALC-MCNC: NORMAL MG/DL (ref 1–30)
WBC # BLD: 6.3 K/UL (ref 3.5–11.3)
WBC # BLD: ABNORMAL 10*3/UL

## 2020-10-14 PROCEDURE — 81003 URINALYSIS AUTO W/O SCOPE: CPT

## 2020-10-14 PROCEDURE — 80061 LIPID PANEL: CPT

## 2020-10-14 PROCEDURE — 36415 COLL VENOUS BLD VENIPUNCTURE: CPT

## 2020-10-14 PROCEDURE — 82306 VITAMIN D 25 HYDROXY: CPT

## 2020-10-14 PROCEDURE — 85025 COMPLETE CBC W/AUTO DIFF WBC: CPT

## 2020-10-14 PROCEDURE — 74019 RADEX ABDOMEN 2 VIEWS: CPT

## 2020-10-14 PROCEDURE — 80053 COMPREHEN METABOLIC PANEL: CPT

## 2020-10-14 PROCEDURE — 84443 ASSAY THYROID STIM HORMONE: CPT

## 2022-04-18 ENCOUNTER — HOSPITAL ENCOUNTER (EMERGENCY)
Age: 52
Discharge: HOME | End: 2022-04-18
Attending: EMERGENCY MEDICINE
Payer: COMMERCIAL

## 2022-04-18 VITALS
TEMPERATURE: 97 F | OXYGEN SATURATION: 96 % | HEIGHT: 69 IN | WEIGHT: 180 LBS | BODY MASS INDEX: 26.66 KG/M2 | HEART RATE: 87 BPM | DIASTOLIC BLOOD PRESSURE: 86 MMHG | SYSTOLIC BLOOD PRESSURE: 119 MMHG | RESPIRATION RATE: 14 BRPM

## 2022-04-18 DIAGNOSIS — F10.930 ALCOHOL WITHDRAWAL SYNDROME WITHOUT COMPLICATION (HCC): Primary | ICD-10-CM

## 2022-04-18 LAB
-: ABNORMAL
ABSOLUTE EOS #: 0.08 K/UL (ref 0–0.44)
ABSOLUTE IMMATURE GRANULOCYTE: 0 K/UL (ref 0–0.3)
ABSOLUTE LYMPH #: 0.51 K/UL (ref 1.1–3.7)
ABSOLUTE MONO #: 0.55 K/UL (ref 0.1–1.2)
ACETAMINOPHEN LEVEL: <5 UG/ML (ref 10–30)
ALBUMIN SERPL-MCNC: 4.4 G/DL (ref 3.5–5.2)
ALBUMIN/GLOBULIN RATIO: 1.6 (ref 1–2.5)
ALP BLD-CCNC: 75 U/L (ref 40–129)
ALT SERPL-CCNC: 43 U/L (ref 5–41)
AMPHETAMINE SCREEN URINE: NEGATIVE
ANION GAP SERPL CALCULATED.3IONS-SCNC: 15 MMOL/L (ref 9–17)
AST SERPL-CCNC: 56 U/L
BARBITURATE SCREEN URINE: NEGATIVE
BASOPHILS # BLD: 1 % (ref 0–2)
BASOPHILS ABSOLUTE: 0.04 K/UL (ref 0–0.2)
BENZODIAZEPINE SCREEN, URINE: NEGATIVE
BILIRUB SERPL-MCNC: 0.78 MG/DL (ref 0.3–1.2)
BILIRUBIN URINE: NEGATIVE
BUN BLDV-MCNC: 12 MG/DL (ref 6–20)
CALCIUM SERPL-MCNC: 9.1 MG/DL (ref 8.6–10.4)
CANNABINOID SCREEN URINE: NEGATIVE
CASTS UA: ABNORMAL /LPF (ref 0–8)
CHLORIDE BLD-SCNC: 98 MMOL/L (ref 98–107)
CO2: 20 MMOL/L (ref 20–31)
COCAINE METABOLITE, URINE: NEGATIVE
COLOR: YELLOW
CREAT SERPL-MCNC: 0.65 MG/DL (ref 0.7–1.2)
EOSINOPHILS RELATIVE PERCENT: 2 % (ref 1–4)
EPITHELIAL CELLS UA: ABNORMAL /HPF (ref 0–5)
ETHANOL PERCENT: <0.01 %
ETHANOL: <10 MG/DL
GFR AFRICAN AMERICAN: >60 ML/MIN
GFR NON-AFRICAN AMERICAN: >60 ML/MIN
GFR SERPL CREATININE-BSD FRML MDRD: ABNORMAL ML/MIN/{1.73_M2}
GLUCOSE BLD-MCNC: 136 MG/DL (ref 70–99)
GLUCOSE URINE: NEGATIVE
HCT VFR BLD CALC: 44.1 % (ref 40.7–50.3)
HEMOGLOBIN: 15 G/DL (ref 13–17)
IMMATURE GRANULOCYTES: 0 %
KETONES, URINE: ABNORMAL
LEUKOCYTE ESTERASE, URINE: ABNORMAL
LIPASE: 47 U/L (ref 13–60)
LYMPHOCYTES # BLD: 13 % (ref 24–43)
MAGNESIUM: 3 MG/DL (ref 1.6–2.6)
MCH RBC QN AUTO: 33.3 PG (ref 25.2–33.5)
MCHC RBC AUTO-ENTMCNC: 34 G/DL (ref 28.4–34.8)
MCV RBC AUTO: 97.8 FL (ref 82.6–102.9)
METHADONE SCREEN, URINE: NEGATIVE
MONOCYTES # BLD: 14 % (ref 3–12)
MORPHOLOGY: NORMAL
MUCUS: ABNORMAL
NITRITE, URINE: NEGATIVE
NRBC AUTOMATED: 0 PER 100 WBC
OPIATES, URINE: NEGATIVE
OXYCODONE SCREEN URINE: NEGATIVE
PDW BLD-RTO: 13.3 % (ref 11.8–14.4)
PH UA: 5.5 (ref 5–8)
PHENCYCLIDINE, URINE: NEGATIVE
PLATELET # BLD: ABNORMAL K/UL (ref 138–453)
PLATELET, FLUORESCENCE: NORMAL K/UL (ref 138–453)
POTASSIUM SERPL-SCNC: 3.5 MMOL/L (ref 3.7–5.3)
PROTEIN UA: ABNORMAL
RBC # BLD: 4.51 M/UL (ref 4.21–5.77)
RBC UA: ABNORMAL /HPF (ref 0–4)
REASON FOR REJECTION: NORMAL
SALICYLATE LEVEL: <1 MG/DL (ref 3–10)
SEG NEUTROPHILS: 70 % (ref 36–65)
SEGMENTED NEUTROPHILS ABSOLUTE COUNT: 2.72 K/UL (ref 1.5–8.1)
SODIUM BLD-SCNC: 133 MMOL/L (ref 135–144)
SPECIFIC GRAVITY UA: 1.03 (ref 1–1.03)
TEST INFORMATION: NORMAL
TOTAL PROTEIN: 7.1 G/DL (ref 6.4–8.3)
TOXIC TRICYCLIC SC,BLOOD: NEGATIVE
TROPONIN, HIGH SENSITIVITY: <6 NG/L (ref 0–22)
TURBIDITY: ABNORMAL
URINE HGB: NEGATIVE
UROBILINOGEN, URINE: NORMAL
WBC # BLD: 3.9 K/UL (ref 3.5–11.3)
WBC UA: ABNORMAL /HPF (ref 0–5)
ZZ NTE CLEAN UP: ORDERED TEST: NORMAL
ZZ NTE WITH NAME CLEAN UP: SPECIMEN SOURCE: NORMAL

## 2022-04-18 PROCEDURE — 93005 ELECTROCARDIOGRAM TRACING: CPT | Performed by: STUDENT IN AN ORGANIZED HEALTH CARE EDUCATION/TRAINING PROGRAM

## 2022-04-18 PROCEDURE — 83735 ASSAY OF MAGNESIUM: CPT

## 2022-04-18 PROCEDURE — G0480 DRUG TEST DEF 1-7 CLASSES: HCPCS

## 2022-04-18 PROCEDURE — 6360000002 HC RX W HCPCS: Performed by: STUDENT IN AN ORGANIZED HEALTH CARE EDUCATION/TRAINING PROGRAM

## 2022-04-18 PROCEDURE — 80179 DRUG ASSAY SALICYLATE: CPT

## 2022-04-18 PROCEDURE — 99284 EMERGENCY DEPT VISIT MOD MDM: CPT

## 2022-04-18 PROCEDURE — 80053 COMPREHEN METABOLIC PANEL: CPT

## 2022-04-18 PROCEDURE — 80143 DRUG ASSAY ACETAMINOPHEN: CPT

## 2022-04-18 PROCEDURE — 80307 DRUG TEST PRSMV CHEM ANLYZR: CPT

## 2022-04-18 PROCEDURE — 81001 URINALYSIS AUTO W/SCOPE: CPT

## 2022-04-18 PROCEDURE — 6370000000 HC RX 637 (ALT 250 FOR IP): Performed by: STUDENT IN AN ORGANIZED HEALTH CARE EDUCATION/TRAINING PROGRAM

## 2022-04-18 PROCEDURE — 84484 ASSAY OF TROPONIN QUANT: CPT

## 2022-04-18 PROCEDURE — H0049 ALCOHOL/DRUG SCREENING: HCPCS | Performed by: SOCIAL WORKER

## 2022-04-18 PROCEDURE — 6360000002 HC RX W HCPCS

## 2022-04-18 PROCEDURE — 96365 THER/PROPH/DIAG IV INF INIT: CPT

## 2022-04-18 PROCEDURE — 85055 RETICULATED PLATELET ASSAY: CPT

## 2022-04-18 PROCEDURE — 85025 COMPLETE CBC W/AUTO DIFF WBC: CPT

## 2022-04-18 PROCEDURE — 2580000003 HC RX 258: Performed by: STUDENT IN AN ORGANIZED HEALTH CARE EDUCATION/TRAINING PROGRAM

## 2022-04-18 PROCEDURE — 83690 ASSAY OF LIPASE: CPT

## 2022-04-18 RX ORDER — MAGNESIUM SULFATE 1 G/100ML
INJECTION INTRAVENOUS
Status: COMPLETED
Start: 2022-04-18 | End: 2022-04-18

## 2022-04-18 RX ORDER — 0.9 % SODIUM CHLORIDE 0.9 %
1000 INTRAVENOUS SOLUTION INTRAVENOUS ONCE
Status: COMPLETED | OUTPATIENT
Start: 2022-04-18 | End: 2022-04-18

## 2022-04-18 RX ORDER — MAGNESIUM SULFATE 1 G/100ML
1000 INJECTION INTRAVENOUS ONCE
Status: COMPLETED | OUTPATIENT
Start: 2022-04-18 | End: 2022-04-18

## 2022-04-18 RX ORDER — KETOROLAC TROMETHAMINE 30 MG/ML
30 INJECTION, SOLUTION INTRAMUSCULAR; INTRAVENOUS ONCE
Status: COMPLETED | OUTPATIENT
Start: 2022-04-18 | End: 2022-04-18

## 2022-04-18 RX ORDER — LORAZEPAM 0.5 MG/1
1 TABLET ORAL ONCE
Status: COMPLETED | OUTPATIENT
Start: 2022-04-18 | End: 2022-04-18

## 2022-04-18 RX ADMIN — LORAZEPAM 1 MG: 0.5 TABLET ORAL at 11:03

## 2022-04-18 RX ADMIN — MAGNESIUM SULFATE HEPTAHYDRATE 1000 MG: 1 INJECTION, SOLUTION INTRAVENOUS at 10:37

## 2022-04-18 RX ADMIN — SODIUM CHLORIDE 1000 ML: 9 INJECTION, SOLUTION INTRAVENOUS at 10:04

## 2022-04-18 RX ADMIN — KETOROLAC TROMETHAMINE 30 MG: 30 INJECTION, SOLUTION INTRAMUSCULAR at 10:03

## 2022-04-18 RX ADMIN — POTASSIUM BICARBONATE 20 MEQ: 782 TABLET, EFFERVESCENT ORAL at 14:09

## 2022-04-18 RX ADMIN — MAGNESIUM SULFATE 1000 MG: 1 INJECTION INTRAVENOUS at 10:37

## 2022-04-18 ASSESSMENT — ENCOUNTER SYMPTOMS
VOMITING: 1
CONSTIPATION: 0
ABDOMINAL PAIN: 1
SORE THROAT: 0
TROUBLE SWALLOWING: 0
ABDOMINAL DISTENTION: 0
RHINORRHEA: 0
NAUSEA: 1
COUGH: 0
WHEEZING: 0
SHORTNESS OF BREATH: 0
BACK PAIN: 0
CHEST TIGHTNESS: 0
DIARRHEA: 0

## 2022-04-18 ASSESSMENT — PAIN DESCRIPTION - PAIN TYPE: TYPE: ACUTE PAIN

## 2022-04-18 ASSESSMENT — PAIN DESCRIPTION - DESCRIPTORS: DESCRIPTORS: ACHING

## 2022-04-18 ASSESSMENT — PAIN DESCRIPTION - LOCATION: LOCATION: HEAD

## 2022-04-18 ASSESSMENT — PAIN SCALES - GENERAL
PAINLEVEL_OUTOF10: 8
PAINLEVEL_OUTOF10: 8

## 2022-04-18 ASSESSMENT — PAIN - FUNCTIONAL ASSESSMENT: PAIN_FUNCTIONAL_ASSESSMENT: 0-10

## 2022-04-18 ASSESSMENT — PAIN DESCRIPTION - ORIENTATION: ORIENTATION: RIGHT;LEFT

## 2022-04-18 ASSESSMENT — PAIN DESCRIPTION - FREQUENCY: FREQUENCY: CONTINUOUS

## 2022-04-18 ASSESSMENT — PATIENT HEALTH QUESTIONNAIRE - PHQ9: SUM OF ALL RESPONSES TO PHQ QUESTIONS 1-9: 6

## 2022-04-18 NOTE — ED PROVIDER NOTES
hours ago denies any other drug use. On exam patient is anxious but not tremulous normal heart rate normal blood pressure normal mental status normal pupils. Abdomen soft no focal tenderness rebound or guarding. Will give fluids, meds, labs, reevaluate need for medical admission versus detox center. EKG interpretation: Sinus rhythm 87. Normal intervals except QTC of 527. Normal axis. No acute ST or T changes.   No change from prior on 8/13/2020 with the exception of prolonged QTC    Critical Care     none    Mable Knox MD, Rossi Horne  Attending Emergency  Physician             Mable Knox MD  04/18/22 1819

## 2022-04-18 NOTE — ED NOTES
Pt. To be DC to [facility] via cab   SW to arrange transportation      Cristian Cruz RN  04/18/22 5410

## 2022-04-18 NOTE — ED NOTES
Patient stated he wants to go back to [facility] for ETOH withdrawal management. Patient stated it has been about 1.5 years since he was last there. Patient stated he also wants to get back on his psychiatric medications. Patient stated he has been drinking a fifth of vodka daily for the past 3 months. Patient stated his last drink was 2 days ago. Patient confirmed withdrawal symptoms of anxiety, n/v & feeling his skin is flushed. Patient stated he was seen at [facility], last appointment was several months ago. Patient stated he was on seroquel 300mg & lexapro but the psychiatrist was taking him off lexapro because it wasn't working. Patient stated it has been several months since he last took his medications. Patient denied hx withdrawal seizures, current or hx of SI/HI, a/v hallucinations. Patient confirmed stable housing, denied current legal concerns. Writer informed patient of application process/labs/urine needed, patient expressed understanding. Writer to fax chart to [facility] when labs result.       TRENT Walden  04/18/22 9825

## 2022-04-18 NOTE — ED NOTES
Pt. Provided urinal, directed urine needed for [facility] placement  Pt.  Verbalized understanding      Audelia Amin RN  04/18/22 [id number]

## 2022-04-18 NOTE — ED NOTES
Pt. Resting on stretcher, eyes open, RR even and non-labored  Pt.  Updated on POC  Will continue to monitor        Masoud Bryant RN  04/18/22 [id number]

## 2022-04-18 NOTE — ED PROVIDER NOTES
[facility] ED  Emergency Department Encounter  Emergency Medicine Resident     Pt Name: Ozzie Bryant  MRN: [id number]  [place] 1970  Date of evaluation: 4/18/22  PCP:  . None (Inactive)    CHIEF COMPLAINT       Chief Complaint   Patient presents with    Nausea    Panic Attack    Emesis    Other     alcohol withdrawl       HISTORY OFPRESENT ILLNESS  (Location/Symptom, Timing/Onset, Context/Setting, Quality, Duration, Modifying Factors,Severity.)      Ozzie Bryant is a 47 y.o. male who presents with concerns for nausea, etc. heart rate, emesis in the setting of alcohol withdrawal.  Patient had been sober for period of 18 months from alcohol use, states that 1 month ago he had increasing anxiety unable to be treated with medication, states that he started drinking. Patient states he would like to become clean from alcohol again, states that his last drink was on Friday at midnight. Patient endorses loose stools, nausea, vomiting without blood in his vomit, also endorses epigastric abdominal pain, feeling of anxiety as well as palpitations. Patient has previously had alcohol withdrawal in the past, has not required intubation however did have multiple tremors at that time. Patient denies sick contacts, states that he was previously treated [facility] around like the opportunity to be treated there again if possible. PAST MEDICAL / SURGICAL / SOCIAL / FAMILY HISTORY      has a past medical history of Acute superficial gastritis without hemorrhage, Alcohol abuse, Coffee ground emesis, Duodenitis, Esophagitis, GI bleed, Hematemesis, Hepatic steatosis, Hypertension, Intractable vomiting, Metabolic acidosis, increased anion gap, Severe major depression ([address] Suicidal behavior, and Upper GI bleed. has a past surgical history that includes hernia repair; Upper gastrointestinal endoscopy (6-5-14); hernia repair (Right, 5/16/2019);  Upper gastrointestinal endoscopy (N/A, 5/30/2019); Upper gastrointestinal endoscopy (N/A, 12/5/2019); and Upper gastrointestinal endoscopy (N/A, 8/14/2020). Social History     Socioeconomic History    Marital status:      Spouse name: Not on file    Number of children: Not on file    Years of education: Not on file    Highest education level: Not on file   Occupational History    Not on file   Tobacco Use    Smoking status: Never Smoker    Smokeless tobacco: Never Used    Tobacco comment: pt is a nonsmioker   Vaping Use    Vaping Use: Never used   Substance and Sexual Activity    Alcohol use: Yes     Comment: a fifth a day vodka/day    Drug use: No    Sexual activity: Not Currently   Other Topics Concern    Not on file   Social History Narrative    Not on file     Social Determinants of Health     Financial Resource Strain:     Difficulty of Paying Living Expenses: Not on file   Food Insecurity:     Worried About Running Out of Food in the Last Year: Not on file    Ignacia of Food in the Last Year: Not on file   Transportation Needs:     Lack of Transportation (Medical): Not on file    Lack of Transportation (Non-Medical):  Not on file   Physical Activity:     Days of Exercise per Week: Not on file    Minutes of Exercise per Session: Not on file   Stress:     Feeling of Stress : Not on file   Social Connections:     Frequency of Communication with Friends and Family: Not on file    Frequency of Social Gatherings with Friends and Family: Not on file    Attends [employer] Services: Not on file    Active Member of Clubs or Organizations: Not on file    Attends Club or Organization Meetings: Not on file    Marital Status: Not on file   Intimate Partner Violence:     Fear of Current or Ex-Partner: Not on file    Emotionally Abused: Not on file    Physically Abused: Not on file    Sexually Abused: Not on file   Housing Stability:     Unable to Pay for Housing in the Last Year: Not on file    Number of [place] in the Last Year: Not on file    Unstable Housing in the Last Year: Not on file       Family History   Adopted: Yes   Problem Relation Age of Onset    Asthma Mother     Crohn's Disease Mother     No Known Problems Father     Other Sister         Fibromyalgia    No Known Problems Brother     No Known Problems Maternal Grandmother     No Known Problems Maternal Grandfather     No Known Problems Paternal Grandmother     No Known Problems Paternal Grandfather     No Known Problems Daughter     Depression Daughter     No Known Problems Son         Allergies:  Pcn [penicillins]    Home Medications:  Prior to Admission medications    Medication Sig Start Date End Date Taking?  Authorizing Provider   nicotine (NICODERM CQ) 21 MG/24HR Place 1 patch onto the skin daily as needed (if patient is a smoker and requests nicotine replacemnt therapy) 7/22/20   Lamona Borrow   fluticasone [facility]) 50 MCG/ACT nasal spray 2 sprays by Nasal route daily  Patient not taking: Reported on 12/17/2019 8/29/19   EMANUEL Fowler - CNP   ondansetron (ZOFRAN) 4 MG tablet Take 1 tablet by mouth every 8 hours as needed for Nausea or Vomiting  Patient not taking: Reported on 12/17/2019 6/16/19   Maikol Alonso MD   folic acid (FOLVITE) 1 MG tablet Take 1 tablet by mouth daily  Patient not taking: Reported on 12/17/2019 6/17/19   Maikol Alonso MD   midodrine (PROAMATINE) 5 MG tablet Take 1 tablet by mouth 3 times daily (with meals) Hold if systolic blood pressure is more than 110  Patient not taking: Reported on 12/17/2019 6/17/19   Maikol Alonso MD   vitamin B-1 100 MG tablet Take 1 tablet by mouth daily  Patient not taking: Reported on 12/17/2019 6/17/19   Maikol Alonso MD   buPROPion (WELLBUTRIN XL) 150 MG extended release tablet Take 150 mg by mouth every morning    Historical Provider, MD   Multiple Vitamin (MULTIVITAMIN) tablet Take 1 tablet by mouth daily  Patient not taking: Reported on 12/17/2019 5/30/19   Marci Ann DO pantoprazole (PROTONIX) 40 MG tablet Take 1 tablet by mouth every morning (before breakfast)  Patient not taking: Reported on 12/17/2019 5/30/19   John Hope DO   traZODone (DESYREL) 50 MG tablet Take 1 tablet by mouth nightly as needed for Sleep 4/15/19   Tacho Huff MD   QUEtiapine (SEROQUEL) 300 MG tablet Take 1 tablet by mouth nightly 4/15/19   Tacho Huff MD       REVIEW OFSYSTEMS    (2-9 systems for level 4, 10 or more for level 5)      Review of Systems   Constitutional: Negative for chills, diaphoresis, fatigue and fever. HENT: Negative for rhinorrhea, sore throat, tinnitus and trouble swallowing. Eyes: Negative for visual disturbance. Respiratory: Negative for cough, chest tightness, shortness of breath and wheezing. Cardiovascular: Negative for chest pain and leg swelling. Gastrointestinal: Positive for abdominal pain, nausea and vomiting. Negative for abdominal distention, constipation and diarrhea. Endocrine: Negative for polyuria. Genitourinary: Negative for dysuria, flank pain and frequency. Musculoskeletal: Negative for arthralgias, back pain, joint swelling and myalgias. Neurological: Negative for dizziness, tremors, seizures, weakness, light-headedness, numbness and headaches. Psychiatric/Behavioral: The patient is nervous/anxious. PHYSICAL EXAM   (up to 7 for level 4, 8 or more forlevel 5)      INITIAL VITALS:   ED Triage Vitals   BP Temp Temp Source Pulse Resp SpO2 Height Weight   04/18/22 0850 04/18/22 0850 04/18/22 0850 04/18/22 0850 04/18/22 0853 04/18/22 0850 04/18/22 0850 04/18/22 0850   (!) 136/93 97 °F (36.1 °C) Oral 97 18 97 % 5' 9\" (1.753 m) 180 lb (81.6 kg)       Physical Exam  Constitutional:       General: He is not in acute distress. Appearance: He is not ill-appearing. HENT:      Head: Normocephalic and atraumatic.       Right Ear: External ear normal.      Left Ear: External ear normal.   Eyes:      Extraocular Movements: Extraocular movements intact. Cardiovascular:      Rate and Rhythm: Normal rate and regular rhythm. Pulmonary:      Effort: Pulmonary effort is normal.   Abdominal:      General: Abdomen is flat. Musculoskeletal:         General: No deformity or signs of injury. Skin:     General: Skin is warm. Capillary Refill: Capillary refill takes less than 2 seconds. Neurological:      Mental Status: He is oriented to person, place, and time. Mental status is at baseline. Psychiatric:         Mood and Affect: Mood normal.         DIFFERENTIAL  DIAGNOSIS     PLAN (LABS / IMAGING / EKG):  Orders Placed This Encounter   Procedures    CBC with Auto Differential    Immature Platelet Fraction    SPECIMEN REJECTION    Comprehensive Metabolic Panel    Lipase    Magnesium    PREVIOUS SPECIMEN    Troponin    Urine Drug Screen    Urinalysis with Reflex to Culture    TOX SCR, BLD, ED    Microscopic Urinalysis    Inpatient consult to Social Work    EKG 12 Lead       MEDICATIONS ORDERED:  Orders Placed This Encounter   Medications    0.9 % sodium chloride bolus    ketorolac (TORADOL) injection 30 mg    magnesium sulfate 1000 mg in dextrose 5% 100 mL IVPB    magnesium sulfate 1-5 GM/100ML-% IVPB (premix)     YESENIA LOPEZ: cabinet override    LORazepam (ATIVAN) tablet 1 mg    potassium bicarb-citric acid (EFFER-K) effervescent tablet 20 mEq       DDX: High cholesterol, pancreatitis, nausea, vomiting, gastroenterologist    Initial MDM/Plan/ED COURSE:    47 y.o. male who presents with with concerns for anxiety, tachycardia, epigastric abdominal pain, nausea, vomiting, diarrhea in the setting of suspected alcohol withdrawal with patient's last alcoholic remembers being at midnight on Friday.   Patient denies sick contacts, plan to be admitted to [facility] facility, I did attempt to medically clear patient for a ride, will speak with social work about potential resources    ED Course as of 04/18/22 1741   Mon Apr 18, 2022   1033 Evaluation noted on EKG, plan to give 1 g of magnesium as well as check patient's mag [GP]      ED Course User Index  [GP] Ranjith Rojas MD   Patient treated with magnesium in the emergency department, also treated with potassium with concerns for a potassium of 3.5. Patient's labs grossly unremarkable for acute pathology, patient symptomatically, medically cleared for inpatient rehabilitation of alcohol withdrawal.:     DIAGNOSTIC RESULTS / EMERGENCYDEPARTMENT COURSE / MDM     LABS:  Labs Reviewed   COMPREHENSIVE METABOLIC PANEL - Abnormal; Notable for the following components:       Result Value    Glucose 136 (*)     CREATININE 0.65 (*)     Sodium 133 (*)     Potassium 3.5 (*)     ALT 43 (*)     AST 56 (*)     All other components within normal limits   MAGNESIUM - Abnormal; Notable for the following components:    Magnesium 3.0 (*)     All other components within normal limits   URINALYSIS WITH REFLEX TO CULTURE - Abnormal; Notable for the following components:    Turbidity UA Cloudy (*)     Ketones, Urine MODERATE (*)     Protein, UA 1+ (*)     Leukocyte Esterase, Urine TRACE (*)     All other components within normal limits   TOX SCR, BLD, ED - Abnormal; Notable for the following components:    Acetaminophen Level <5 (*)     Salicylate Lvl <1 (*)     All other components within normal limits   MICROSCOPIC URINALYSIS - Abnormal; Notable for the following components:    Mucus, UA 1+ (*)     All other components within normal limits   CBC WITH AUTO DIFFERENTIAL   IMMATURE PLATELET FRACTION   SPECIMEN REJECTION   LIPASE   TROPONIN   URINE DRUG SCREEN   PREVIOUS SPECIMEN           No results found. PROCEDURES:  None    CONSULTS:  IP CONSULT TO SOCIAL WORK    CRITICAL CARE:  Please see attending note    FINAL IMPRESSION      1.  Alcohol withdrawal syndrome without complication ([facility] [address]          DISPOSITION / PLAN     DISPOSITION Decision To Discharge 04/18/2022 12:43:14 PM      PATIENT REFERRED TO:  [facility]  [address] [address]  [phone]    As needed    .  None      As needed      DISCHARGE MEDICATIONS:  Discharge Medication List as of 4/18/2022 12:53 PM          José Miguel Velasquez MD  Emergency Medicine Resident    (Please note that portions of this note were completed with a voice recognition program.Efforts were made to edit the dictations but occasionally words are mis-transcribed.)        José Miguel Velasquez MD  Resident  04/18/22 4953

## 2022-04-18 NOTE — ED NOTES
Dr Jag Smith accepted patient to [facility]. Writer to voucher patient.       TRENT Morris  04/18/22 1272

## 2022-04-18 NOTE — ED NOTES
Urine obtained, labeled and sent to lab  Pt. Resting on stretcher, eyes open, RR even and non-labored  Pt. Updated on POC  Will continue to monitor   Pt.  Denies any further needs at this time      Maria Alejandra Paniagua RN  04/18/22 3126

## 2022-04-18 NOTE — ED NOTES
Writer met with patient at bedside to complete the SBIRT assessment. The results can be located in the ED navigator under screening questions.      Patient scored as following:    AUDIT (Alcohol Screening Questionnaire): 33  DAST (Drug Screening Questionnaire): 0  PHQ-9 (Depression Screening Questionnaire): 6      The patient was provided with the following resources/interventions: [facility] application    Start Time 10:50  End Time 11:10  Diagnosis Z71.41, Z13.39       TRENT Sandoval  04/18/22 113

## 2022-04-19 LAB
EKG ATRIAL RATE: 87 BPM
EKG P AXIS: 45 DEGREES
EKG P-R INTERVAL: 142 MS
EKG Q-T INTERVAL: 438 MS
EKG QRS DURATION: 94 MS
EKG QTC CALCULATION (BAZETT): 527 MS
EKG R AXIS: 48 DEGREES
EKG T AXIS: 75 DEGREES
EKG VENTRICULAR RATE: 87 BPM

## 2022-04-19 PROCEDURE — 93010 ELECTROCARDIOGRAM REPORT: CPT | Performed by: INTERNAL MEDICINE

## 2022-06-08 ENCOUNTER — APPOINTMENT (OUTPATIENT)
Dept: GENERAL RADIOLOGY | Age: 52
End: 2022-06-08
Payer: COMMERCIAL

## 2022-06-08 ENCOUNTER — HOSPITAL ENCOUNTER (EMERGENCY)
Age: 52
Discharge: HOME | End: 2022-06-08
Attending: EMERGENCY MEDICINE
Payer: COMMERCIAL

## 2022-06-08 VITALS
SYSTOLIC BLOOD PRESSURE: 128 MMHG | TEMPERATURE: 97.9 F | WEIGHT: 166 LBS | BODY MASS INDEX: 24.59 KG/M2 | HEART RATE: 93 BPM | HEIGHT: 69 IN | RESPIRATION RATE: 20 BRPM | OXYGEN SATURATION: 96 % | DIASTOLIC BLOOD PRESSURE: 85 MMHG

## 2022-06-08 DIAGNOSIS — R11.2 NON-INTRACTABLE VOMITING WITH NAUSEA, UNSPECIFIED VOMITING TYPE: ICD-10-CM

## 2022-06-08 DIAGNOSIS — F10.930 ALCOHOL WITHDRAWAL SYNDROME WITHOUT COMPLICATION (HCC): Primary | ICD-10-CM

## 2022-06-08 LAB
ABSOLUTE EOS #: 0.04 K/UL (ref 0–0.44)
ABSOLUTE IMMATURE GRANULOCYTE: 0.03 K/UL (ref 0–0.3)
ABSOLUTE LYMPH #: 0.77 K/UL (ref 1.1–3.7)
ABSOLUTE MONO #: 0.68 K/UL (ref 0.1–1.2)
ACETAMINOPHEN LEVEL: <5 UG/ML (ref 10–30)
ALBUMIN SERPL-MCNC: 4.8 G/DL (ref 3.5–5.2)
ALBUMIN/GLOBULIN RATIO: 1.6 (ref 1–2.5)
ALP BLD-CCNC: 80 U/L (ref 40–129)
ALT SERPL-CCNC: 20 U/L (ref 5–41)
ANION GAP SERPL CALCULATED.3IONS-SCNC: 26 MMOL/L (ref 9–17)
AST SERPL-CCNC: 31 U/L
BASOPHILS # BLD: 0 % (ref 0–2)
BASOPHILS ABSOLUTE: <0.03 K/UL (ref 0–0.2)
BILIRUB SERPL-MCNC: 0.77 MG/DL (ref 0.3–1.2)
BUN BLDV-MCNC: 13 MG/DL (ref 6–20)
CALCIUM SERPL-MCNC: 11.6 MG/DL (ref 8.6–10.4)
CHLORIDE BLD-SCNC: 88 MMOL/L (ref 98–107)
CO2: 21 MMOL/L (ref 20–31)
CREAT SERPL-MCNC: 1.21 MG/DL (ref 0.7–1.2)
EOSINOPHILS RELATIVE PERCENT: 1 % (ref 1–4)
ETHANOL PERCENT: <0.01 %
ETHANOL: <10 MG/DL
GFR AFRICAN AMERICAN: >60 ML/MIN
GFR NON-AFRICAN AMERICAN: >60 ML/MIN
GFR SERPL CREATININE-BSD FRML MDRD: ABNORMAL ML/MIN/{1.73_M2}
GLUCOSE BLD-MCNC: 168 MG/DL (ref 70–99)
HCT VFR BLD CALC: 43.6 % (ref 40.7–50.3)
HEMOGLOBIN: 14.9 G/DL (ref 13–17)
IMMATURE GRANULOCYTES: 0 %
INR BLD: 1
LACTIC ACID, WHOLE BLOOD: 4.1 MMOL/L (ref 0.7–2.1)
LIPASE: 14 U/L (ref 13–60)
LYMPHOCYTES # BLD: 9 % (ref 24–43)
MAGNESIUM: 1.2 MG/DL (ref 1.6–2.6)
MCH RBC QN AUTO: 34.5 PG (ref 25.2–33.5)
MCHC RBC AUTO-ENTMCNC: 34.2 G/DL (ref 28.4–34.8)
MCV RBC AUTO: 100.9 FL (ref 82.6–102.9)
MONOCYTES # BLD: 8 % (ref 3–12)
NRBC AUTOMATED: 0 PER 100 WBC
PARTIAL THROMBOPLASTIN TIME: 22.4 SEC (ref 20.5–30.5)
PDW BLD-RTO: 13.2 % (ref 11.8–14.4)
PHOSPHORUS: 2.9 MG/DL (ref 2.5–4.5)
PLATELET # BLD: ABNORMAL K/UL (ref 138–453)
PLATELET, FLUORESCENCE: NORMAL K/UL (ref 138–453)
POTASSIUM SERPL-SCNC: 4.5 MMOL/L (ref 3.7–5.3)
PROTHROMBIN TIME: 11 SEC (ref 9.1–12.3)
RBC # BLD: 4.32 M/UL (ref 4.21–5.77)
REASON FOR REJECTION: NORMAL
SALICYLATE LEVEL: <1 MG/DL (ref 3–10)
SEG NEUTROPHILS: 83 % (ref 36–65)
SEGMENTED NEUTROPHILS ABSOLUTE COUNT: 7.27 K/UL (ref 1.5–8.1)
SODIUM BLD-SCNC: 135 MMOL/L (ref 135–144)
TOTAL CK: 140 U/L (ref 39–308)
TOTAL PROTEIN: 7.8 G/DL (ref 6.4–8.3)
TOXIC TRICYCLIC SC,BLOOD: NEGATIVE
TROPONIN, HIGH SENSITIVITY: 8 NG/L (ref 0–22)
TROPONIN, HIGH SENSITIVITY: 8 NG/L (ref 0–22)
WBC # BLD: 8.8 K/UL (ref 3.5–11.3)
ZZ NTE CLEAN UP: ORDERED TEST: NORMAL
ZZ NTE WITH NAME CLEAN UP: SPECIMEN SOURCE: NORMAL

## 2022-06-08 PROCEDURE — 84100 ASSAY OF PHOSPHORUS: CPT

## 2022-06-08 PROCEDURE — 83605 ASSAY OF LACTIC ACID: CPT

## 2022-06-08 PROCEDURE — 85055 RETICULATED PLATELET ASSAY: CPT

## 2022-06-08 PROCEDURE — 93005 ELECTROCARDIOGRAM TRACING: CPT | Performed by: EMERGENCY MEDICINE

## 2022-06-08 PROCEDURE — 84484 ASSAY OF TROPONIN QUANT: CPT

## 2022-06-08 PROCEDURE — 71045 X-RAY EXAM CHEST 1 VIEW: CPT

## 2022-06-08 PROCEDURE — 73610 X-RAY EXAM OF ANKLE: CPT

## 2022-06-08 PROCEDURE — 80143 DRUG ASSAY ACETAMINOPHEN: CPT

## 2022-06-08 PROCEDURE — 99285 EMERGENCY DEPT VISIT HI MDM: CPT

## 2022-06-08 PROCEDURE — 6360000002 HC RX W HCPCS: Performed by: EMERGENCY MEDICINE

## 2022-06-08 PROCEDURE — 83735 ASSAY OF MAGNESIUM: CPT

## 2022-06-08 PROCEDURE — 6370000000 HC RX 637 (ALT 250 FOR IP): Performed by: STUDENT IN AN ORGANIZED HEALTH CARE EDUCATION/TRAINING PROGRAM

## 2022-06-08 PROCEDURE — 85730 THROMBOPLASTIN TIME PARTIAL: CPT

## 2022-06-08 PROCEDURE — 85610 PROTHROMBIN TIME: CPT

## 2022-06-08 PROCEDURE — 80307 DRUG TEST PRSMV CHEM ANLYZR: CPT

## 2022-06-08 PROCEDURE — 82550 ASSAY OF CK (CPK): CPT

## 2022-06-08 PROCEDURE — 83690 ASSAY OF LIPASE: CPT

## 2022-06-08 PROCEDURE — 80053 COMPREHEN METABOLIC PANEL: CPT

## 2022-06-08 PROCEDURE — 80179 DRUG ASSAY SALICYLATE: CPT

## 2022-06-08 PROCEDURE — 2500000003 HC RX 250 WO HCPCS: Performed by: EMERGENCY MEDICINE

## 2022-06-08 PROCEDURE — 96376 TX/PRO/DX INJ SAME DRUG ADON: CPT

## 2022-06-08 PROCEDURE — 96375 TX/PRO/DX INJ NEW DRUG ADDON: CPT

## 2022-06-08 PROCEDURE — 96361 HYDRATE IV INFUSION ADD-ON: CPT

## 2022-06-08 PROCEDURE — 94761 N-INVAS EAR/PLS OXIMETRY MLT: CPT

## 2022-06-08 PROCEDURE — H0050 ALCOHOL/DRUG SERVICE 15 MIN: HCPCS | Performed by: SOCIAL WORKER

## 2022-06-08 PROCEDURE — 85025 COMPLETE CBC W/AUTO DIFF WBC: CPT

## 2022-06-08 PROCEDURE — 2580000003 HC RX 258: Performed by: EMERGENCY MEDICINE

## 2022-06-08 PROCEDURE — G0480 DRUG TEST DEF 1-7 CLASSES: HCPCS

## 2022-06-08 PROCEDURE — 6360000002 HC RX W HCPCS: Performed by: STUDENT IN AN ORGANIZED HEALTH CARE EDUCATION/TRAINING PROGRAM

## 2022-06-08 PROCEDURE — 96365 THER/PROPH/DIAG IV INF INIT: CPT

## 2022-06-08 RX ORDER — ONDANSETRON 4 MG/1
4 TABLET, ORALLY DISINTEGRATING ORAL EVERY 8 HOURS PRN
Qty: 4 TABLET | Refills: 0 | Status: SHIPPED | OUTPATIENT
Start: 2022-06-08

## 2022-06-08 RX ORDER — ONDANSETRON 2 MG/ML
4 INJECTION INTRAMUSCULAR; INTRAVENOUS ONCE
Status: COMPLETED | OUTPATIENT
Start: 2022-06-08 | End: 2022-06-08

## 2022-06-08 RX ORDER — CHLORDIAZEPOXIDE HYDROCHLORIDE 25 MG/1
25 CAPSULE, GELATIN COATED ORAL ONCE
Status: COMPLETED | OUTPATIENT
Start: 2022-06-08 | End: 2022-06-08

## 2022-06-08 RX ORDER — MAGNESIUM SULFATE IN WATER 40 MG/ML
2000 INJECTION, SOLUTION INTRAVENOUS ONCE
Status: COMPLETED | OUTPATIENT
Start: 2022-06-08 | End: 2022-06-08

## 2022-06-08 RX ORDER — LORAZEPAM 2 MG/ML
1 INJECTION INTRAMUSCULAR ONCE
Status: COMPLETED | OUTPATIENT
Start: 2022-06-08 | End: 2022-06-08

## 2022-06-08 RX ORDER — LORAZEPAM 2 MG/ML
2 INJECTION INTRAMUSCULAR ONCE
Status: COMPLETED | OUTPATIENT
Start: 2022-06-08 | End: 2022-06-08

## 2022-06-08 RX ORDER — FAMOTIDINE 10 MG/ML
20 INJECTION, SOLUTION INTRAVENOUS ONCE
Status: COMPLETED | OUTPATIENT
Start: 2022-06-08 | End: 2022-06-08

## 2022-06-08 RX ORDER — 0.9 % SODIUM CHLORIDE 0.9 %
1000 INTRAVENOUS SOLUTION INTRAVENOUS ONCE
Status: COMPLETED | OUTPATIENT
Start: 2022-06-08 | End: 2022-06-08

## 2022-06-08 RX ORDER — DROPERIDOL 2.5 MG/ML
1.25 INJECTION, SOLUTION INTRAMUSCULAR; INTRAVENOUS ONCE
Status: COMPLETED | OUTPATIENT
Start: 2022-06-08 | End: 2022-06-08

## 2022-06-08 RX ADMIN — SODIUM CHLORIDE 1000 ML: 9 INJECTION, SOLUTION INTRAVENOUS at 13:35

## 2022-06-08 RX ADMIN — ONDANSETRON 4 MG: 2 INJECTION INTRAMUSCULAR; INTRAVENOUS at 13:36

## 2022-06-08 RX ADMIN — DROPERIDOL 1.25 MG: 2.5 INJECTION, SOLUTION INTRAMUSCULAR; INTRAVENOUS at 14:26

## 2022-06-08 RX ADMIN — CHLORDIAZEPOXIDE HYDROCHLORIDE 25 MG: 25 CAPSULE ORAL at 19:29

## 2022-06-08 RX ADMIN — LORAZEPAM 2 MG: 2 INJECTION INTRAMUSCULAR; INTRAVENOUS at 18:22

## 2022-06-08 RX ADMIN — LORAZEPAM 1 MG: 2 INJECTION INTRAMUSCULAR; INTRAVENOUS at 15:06

## 2022-06-08 RX ADMIN — MAGNESIUM SULFATE HEPTAHYDRATE 2000 MG: 40 INJECTION, SOLUTION INTRAVENOUS at 14:59

## 2022-06-08 RX ADMIN — FAMOTIDINE 20 MG: 10 INJECTION INTRAVENOUS at 14:26

## 2022-06-08 RX ADMIN — LORAZEPAM 1 MG: 2 INJECTION INTRAMUSCULAR; INTRAVENOUS at 13:36

## 2022-06-08 RX ADMIN — SODIUM CHLORIDE 1000 ML: 9 INJECTION, SOLUTION INTRAVENOUS at 14:57

## 2022-06-08 ASSESSMENT — PAIN DESCRIPTION - DESCRIPTORS: DESCRIPTORS: ACHING

## 2022-06-08 ASSESSMENT — ENCOUNTER SYMPTOMS
SORE THROAT: 0
DIARRHEA: 0
VOMITING: 1
ABDOMINAL PAIN: 1
CONSTIPATION: 0
NAUSEA: 1
SHORTNESS OF BREATH: 0

## 2022-06-08 ASSESSMENT — PAIN SCALES - GENERAL: PAINLEVEL_OUTOF10: 4

## 2022-06-08 ASSESSMENT — PAIN DESCRIPTION - LOCATION: LOCATION: ANKLE

## 2022-06-08 ASSESSMENT — LIFESTYLE VARIABLES
HOW MANY STANDARD DRINKS CONTAINING ALCOHOL DO YOU HAVE ON A TYPICAL DAY: 10 OR MORE
HOW OFTEN DO YOU HAVE A DRINK CONTAINING ALCOHOL: 4 OR MORE TIMES A WEEK

## 2022-06-08 ASSESSMENT — PAIN - FUNCTIONAL ASSESSMENT: PAIN_FUNCTIONAL_ASSESSMENT: 0-10

## 2022-06-08 ASSESSMENT — PAIN DESCRIPTION - ORIENTATION: ORIENTATION: LEFT

## 2022-06-08 NOTE — ED NOTES
Blood pressure cuff switched to other arm due to patient right side lying on cuff. Patient reports continued nausea and 'heart burn'. ED resident notified.      Carlton Larios RN  06/08/22 9180

## 2022-06-08 NOTE — ED NOTES
Writer discussed admission with Andrzej for detox and faxed required documents for review. [facility] will accept patient by Dr. Willam Cuellar, when medically cleared.      Leonel Barlow, [address]  06/08/22 4982

## 2022-06-08 NOTE — ED PROVIDER NOTES
[facility] ED  Emergency Department  Emergency Medicine Resident Sign-out     Care of Zay Balderas was assumed from Dr. Sapna Appiah and is being seen for Drug / Alcohol Assessment (last drink last night. left [facility] AMA. ) and Emesis (dizziness, vomiting with blood streaks in last episode of vomiting. Cp with vomiting. Patient taking omeprzole, seroquel and prozac. )  . The patient's initial evaluation and plan have been discussed with the prior provider who initially evaluated the patient.      EMERGENCY DEPARTMENT COURSE / MEDICAL DECISION MAKING:       MEDICATIONS GIVEN:  Orders Placed This Encounter   Medications    0.9 % sodium chloride bolus    LORazepam (ATIVAN) injection 1 mg    ondansetron (ZOFRAN) injection 4 mg    droperidol (INAPSINE) injection 1.25 mg    famotidine (PEPCID) injection 20 mg    0.9 % sodium chloride bolus    magnesium sulfate 2000 mg in 50 mL IVPB premix    LORazepam (ATIVAN) injection 1 mg       LABS / RADIOLOGY:     Labs Reviewed   CBC WITH AUTO DIFFERENTIAL - Abnormal; Notable for the following components:       Result Value    MCH 34.5 (*)     Seg Neutrophils 83 (*)     Lymphocytes 9 (*)     Absolute Lymph # 0.77 (*)     All other components within normal limits   COMPREHENSIVE METABOLIC PANEL - Abnormal; Notable for the following components:    Glucose 168 (*)     CREATININE 1.21 (*)     Calcium 11.6 (*)     Chloride 88 (*)     Anion Gap 26 (*)     All other components within normal limits   MAGNESIUM - Abnormal; Notable for the following components:    Magnesium 1.2 (*)     All other components within normal limits   LACTIC ACID - Abnormal; Notable for the following components:    Lactic Acid, Whole Blood 4.1 (*)     All other components within normal limits   TOX SCR, BLD, ED - Abnormal; Notable for the following components:    Acetaminophen Level <5 (*)     Salicylate Lvl <1 (*)     All other components within normal limits   PHOSPHORUS   LIPASE TROPONIN   TROPONIN   CK   PROTIME-INR   APTT   SPECIMEN REJECTION   IMMATURE PLATELET FRACTION   URINALYSIS WITH REFLEX TO CULTURE   PREVIOUS SPECIMEN   URINE DRUG SCREEN       XR ANKLE LEFT (MIN 3 VIEWS)    Result Date: 6/8/2022  EXAMINATION: THREE XRAY VIEWS OF THE LEFT ANKLE 6/8/2022 1:40 pm COMPARISON: None. HISTORY: ORDERING SYSTEM PROVIDED HISTORY: Left ankle tenderness over the lateral malleolus status post unknown injury last night TECHNOLOGIST PROVIDED HISTORY: Left ankle tenderness over the lateral malleolus status post unknown injury last night FINDINGS: The bones of the left ankle appear intact. Normal alignment. The bones of the hindfoot also appear intact. Suggestion of minimal soft tissue fullness anterior and posterior to the ankle as noted on the lateral view. Unremarkable appearance of the bones of the left ankle. Questionable soft tissue fullness anterior and posterior to the ankle joint space could represent recent soft tissue trauma. XR CHEST PORTABLE    Result Date: 6/8/2022  EXAMINATION: ONE XRAY VIEW OF THE CHEST 6/8/2022 1:22 pm COMPARISON: Plain film imaging of the chest August 13, 2020 and February 19, 2019. HISTORY: ORDERING SYSTEM PROVIDED HISTORY: CP yesterday, SOB, alcohol withdrawal, hematemesis this AM TECHNOLOGIST PROVIDED HISTORY: CP yesterday, SOB, alcohol withdrawal, hematemesis this AM FINDINGS: Heart is normal in size. No evidence of pneumothorax, pleural effusion, infiltrate, or abnormal lung mass. Central pulmonary vascularity appears normal.  Bony structures are unremarkable in appearance. Unremarkable portable frontal view of the chest.  No evidence of acute cardiopulmonary process. RECENT VITALS:     Temp: 97.9 °F (36.6 °C),  Heart Rate: (!) 115, Resp: 15, BP: (!) 161/84, SpO2: 98 %    This patient is a 47 y.o. Male with concerns for alcohol withdrawal and wishing to seek medical attention for alcohol rehab.   Patient normally drinks fifth of liquor a day with last drink being last night. History of alcohol withdrawal with associated anxiety, diaphoresis, and tremors but no seizures. Patient is also having nausea and vomiting with hematemesis concerning for Michelle-Navarro. Work-up revealed that patient appears to be clinically dehydrated. Patient received 2 L of normal saline along with 2 mg of Ativan for alcohol withdrawal along with 4 mg of Zofran and Inapsine was given for nausea vomiting. Patient is been accepted by [address] [facility]. Awaiting medical clearance after patient can tolerate p.o. challenge. ED Course as of 06/08/22 1705   Wed Jun 08, 2022   1628 Patient excepted [facility], signed out to oncoming resident pending improvement in clinical condition, medical clearance for [facility] discharge. [TS]   1703 Patient tolerated p.o. challenge. Patient notes that he is overall feeling better but his stomach still hurts after drinking the juice. Patient now medically cleared to be discharged to [facility]. [CS]      ED Course User Index  [CS] Brianne Serna DO  [TS] Lacie Cobian MD           OUTSTANDING TASKS / RECOMMENDATIONS:    1. Reevaluate for medication clearance  2. Phenergan if nausea still present     FINAL IMPRESSION:     1. Alcohol withdrawal syndrome without complication ([facility] [address]    2.  Non-intractable vomiting with nausea, unspecified vomiting type        DISPOSITION:         DISPOSITION:  [x]  Discharge   []  Transfer -    []  Admission -     []  Against Medical Advice   []  Eloped   FOLLOW-UP: . None    Schedule an appointment as soon as possible for a visit in 1 week  For followup    [facility]  [address] [place] [address]  [phone]  Go to   As needed, If symptoms worsen     DISCHARGE MEDICATIONS: New Prescriptions    No medications on file           Brianne Serna DO  Emergency Medicine Resident  [address] [facility]       Brianne Serna [place]  Resident  06/08/22 4614

## 2022-06-08 NOTE — ED NOTES
The following labs were labeled with patient stickers & tubed to lab;    []Lavender   []On Ice  [x]Blue  [x]Green/ Yellow  [x]Green/ Black [x]On Ice  []Pink  []Red  []Yellow    []COVID-19 Swab []Rapid    []Urine Sample  []Pelvic Cultures    []Blood Cultures       Kamaljit Nava RN  06/08/22 9668

## 2022-06-08 NOTE — ED NOTES
Patient placed on O2 2L via NC due to O2 saturation of 91% on RA with good waveform.      Karin Marte RN  06/08/22 5652

## 2022-06-08 NOTE — ED NOTES
Patient on phone with Kimo0 Hoang blanca. Virgil Canonn standing by for any assistance the patient may need.      Zohra Brown RN  06/08/22 3976

## 2022-06-08 NOTE — ED PROVIDER NOTES
FACULTY SIGN-OUT  ADDENDUM     Care of this patient was assumed from previous attending physician. The patient's initial evaluation and plan have been discussed with the prior provider who initially evaluated the patient. Attestation  I was available and discussed any additional care issues that arose and coordinated the management plans with the resident(s) caring for the patient during my duty period. Any areas of disagreement with resident's documentation of care or procedures are noted on the chart. I was personally present for the key portions of any/all procedures, during my duty period. I have documented in the chart those procedures where I was not present during the key portions. ED COURSE      The patient was given the following medications:  Orders Placed This Encounter   Medications    0.9 % sodium chloride bolus    LORazepam (ATIVAN) injection 1 mg    ondansetron (ZOFRAN) injection 4 mg    droperidol (INAPSINE) injection 1.25 mg    famotidine (PEPCID) injection 20 mg    0.9 % sodium chloride bolus    magnesium sulfate 2000 mg in 50 mL IVPB premix    LORazepam (ATIVAN) injection 1 mg       RECENT VITALS:   Temp: 97.9 °F (36.6 °C), Heart Rate: 87, Resp: 16, BP: 116/77    MEDICAL DECISION MAKING        Yolis Morel is a 47 y.o. male who presents to the Emergency Department with complaints of substance abuse. Awaiting social work.       Fiona Hernandez MD  Attending Emergency Physician    (Please note that portions of this note were completed with a voice recognition program.  Efforts were made to edit the dictations but occasionally words are mis-transcribed.)          Fiona Hernandez MD  06/08/22 2684

## 2022-06-08 NOTE — ED NOTES
Dr Angeles Murdock and writer at bedside to assess patient. Patient reports he recently left [facility] for alcoholism, states he was drinking etoh last night and has been vomiting for three days. Patient reports dizziness and CP with vomiting. Patient is alert and oriented x4.          Yogesh Sim RN  06/08/22 8622

## 2022-06-08 NOTE — ED NOTES
Patient removed his nasal cannula at unknown time. Patient is asleep, right side lying, RR even and unlabored. Patient has not provided urine sample yet. He remains on full cardiac monitor.        Nancy Amos RN  06/08/22 3616

## 2022-06-08 NOTE — ED NOTES
[facility] now reported patient is being denied admission as his insurance will not pay for detox treatment.       Paola Davenport, [address]  06/08/22 1963

## 2022-06-08 NOTE — ED NOTES
Patient rolling back and forth in bed, removing cardiac leads. Patients blankets and wires untangled, patient's patches replaced. Patients blankets fixed. Patient requesting ativan and more nausea medications. Patient having retching, no active vomiting. Patient given new emesis bag and urinal at bedside for urine sample collection.      Agus Cevallos RN  06/08/22 9480

## 2022-06-08 NOTE — ED NOTES
Patient removed himself from monitor. Patient states he needs to use to restroom. Patient informed that a urine sample and patient continued walking to restroom. No urine sample collected. Patient to receive dose of librium and Paty Cluck will arrange transportation home. Patient to go to [facility] at 0700 tomorrow morning, 6/9/2022.      Nancy Amos RN  06/08/22 1900

## 2022-06-08 NOTE — ED NOTES
Patient placed back on o2 via [place] at 2L. Patient's cardiac patches replaced. Patient reports improvement with nausea.      Enmanuel De Jesus RN  06/08/22 5462

## 2022-06-08 NOTE — ED NOTES
Writer met with patient at bedside regarding his desire to return to [facility] for continued substance use treatment and detox. Patient reports that he left AMA from [facility] 3 days ago and wishes to return. Patient reports that he is ready to stop drinking. He reports that this time will be different as he feels that he will die if he does not stop. Patient states that he has been drinking a fifth a day for the past 3 days, last drink was last night. He reports \"severe\" withdraw symptoms since. Patient currently has nausea, vomiting earlier today, sweats and shakes. Patient reports he will not be able to stop drinking without the help from [facility]. Patient has a history of depression and is open with [facility]. He reports that he is not adherent with his treatment on account of his alcohol use. He denies thoughts of harm to self or others today. Writer met with patient at bedside to complete the SBIRT assessment. The results can be located in the ED navigator under screening questions. Patient scored as following:    AUDIT (Alcohol Screening Questionnaire): 12  DAST (Drug Screening Questionnaire): 0  PHQ-9 (Depression Screening Questionnaire): 0      The patient was provided with the following resources/interventions: Patient requesting assistance with getting back into detox treatment at [facility]. Writer to [employer] when patient is medically cleared.     Start Time 1404  End Time 1420  Diagnosis Z71.41     TRENT Mccormack  06/08/22 1507

## 2022-06-08 NOTE — Clinical Note
Isabela Beverly was seen and treated in our emergency department on 6/8/2022. He may return to work on 06/08/2022. If you have any questions or concerns, please don't hesitate to call.       Pallavi Sims, DO

## 2022-06-08 NOTE — ED NOTES
Dr Lilly Solomon at bedside to re-assess patient. Patient actively vomiting, no blood noted in emesis by writer or resident. Awaiting further orders at this time.      Aliyah Phan RN  06/08/22 8489

## 2022-06-08 NOTE — ED NOTES
The following labs were labeled with patient stickers & tubed to lab;    [x]Lavender   []On Ice  [x]Blue  [x]Green/ Yellow  [x]Green/ Black []On Ice  []Pink  []Red  []Yellow    []COVID-19 Swab []Rapid    []Urine Sample  []Pelvic Cultures    []Blood Cultures       Roseanne Manley RN  06/08/22 4090

## 2022-06-08 NOTE — ED NOTES
Dr. Scot Kelly at bedside to give pt oral fluids to see if they can tolerate PO            Randy Fletcher  06/08/22 8173

## 2022-06-08 NOTE — ED NOTES
Writer assisted patient with completed assessment with [facility]. Per [facility], patients insurance does not cover inpatient detox and therefore he will be denied. Writer called the [facility], they are not able to do after hour admissions,  [facility] reports that they will have male openings in the morning. Patient will need to report for an assessment tomorrow morning, first come first serve. Writer provided patient with the information and encouraged him to present tomorrow at [facility] at [address] [place] [address] for an assessment. Patient understanding that if he is not there early he may miss an opportunity to get an available bed if admission criteria is met.          TRENT Patterson  06/08/22 8050

## 2022-06-08 NOTE — ED PROVIDER NOTES
[facility] ED  Emergency Department Encounter  EmergencyMedicine Resident     Pt Navjot Arizmendi  MRN: [id number]  Ann 1970  Date of evaluation: 6/8/22  PCP:  . None (Inactive)    CHIEF COMPLAINT       Chief Complaint   Patient presents with    Drug / Alcohol Assessment     last drink last night. left [facility] AMA.  Emesis     dizziness, vomiting with blood streaks in last episode of vomiting. Cp with vomiting. Patient taking omeprzole, seroquel and prozac. HISTORY OF PRESENT ILLNESS  (Location/Symptom, Timing/Onset, Context/Setting, Quality, Duration, Modifying Factors, Severity.)      Tejal Mai is a 47 y.o. male who presents to the emergency department with a 1 day history of shortness of breath, multi day history of recurrent vomiting with 1 episode of hematemesis this morning, shaking and chills which patient states is typical for his alcohol withdrawal.  Patient has a history of heavy alcohol abuse and admits to leaving [facility] a few days ago, and he resumed drinking last night and had about 1/5 of vodka. He noticed significant nausea after drinking of vodka and admits to very little food intake and a 20 pound weight loss over the past couple of months. Says that while he was drinking last night he may have injured his left ankle because it now hurts but he does not recall specifically the mechanism. States he would like to be sent back to [facility]. Review of systems demonstrates intermittent chest pain that he experienced yesterday which was not present today, and patient thought it may have been due to his vomiting. Also demonstrates minimal urine output and minimal stooling which patient attributes to not eating. Denies fever, vision changes, HEENT symptoms, current chest pain, shortness of breath, or numbness or tingling anywhere. Patient had echocardiogram in 2019 which demonstrated LVEF 55 to 60%.     PAST MEDICAL / SURGICAL / SOCIAL / FAMILY HISTORY      has a past medical history of Acute superficial gastritis without hemorrhage, Alcohol abuse, Coffee ground emesis, Duodenitis, Esophagitis, GI bleed, Hematemesis, Hepatic steatosis, Hypertension, Intractable vomiting, Metabolic acidosis, increased anion gap, Severe major depression ([address] Suicidal behavior, and Upper GI bleed. has a past surgical history that includes hernia repair; Upper gastrointestinal endoscopy (6-5-14); hernia repair (Right, 5/16/2019); Upper gastrointestinal endoscopy (N/A, 5/30/2019); Upper gastrointestinal endoscopy (N/A, 12/5/2019); and Upper gastrointestinal endoscopy (N/A, 8/14/2020). Social History     Socioeconomic History    Marital status:      Spouse name: Not on file    Number of children: Not on file    Years of education: Not on file    Highest education level: Not on file   Occupational History    Not on file   Tobacco Use    Smoking status: Never Smoker    Smokeless tobacco: Never Used    Tobacco comment: pt is a nonsmioker   Vaping Use    Vaping Use: Never used   Substance and Sexual Activity    Alcohol use: Yes     Comment: a fifth a day vodka/day. last drin 6/7/2022    Drug use: No    Sexual activity: Not Currently   Other Topics Concern    Not on file   Social History Narrative    Not on file     Social Determinants of Health     Financial Resource Strain:     Difficulty of Paying Living Expenses: Not on file   Food Insecurity:     Worried About Running Out of Food in the Last Year: Not on file    Ignacia of Food in the Last Year: Not on file   Transportation Needs:     Lack of Transportation (Medical): Not on file    Lack of Transportation (Non-Medical):  Not on file   Physical Activity:     Days of Exercise per Week: Not on file    Minutes of Exercise per Session: Not on file   Stress:     Feeling of Stress : Not on file   Social Connections:     Frequency of Communication with Friends and Family: Not on file    Frequency of Social Gatherings with Friends and Family: Not on file    Attends Protestant Services: Not on file    Active Member of Clubs or Organizations: Not on file    Attends Club or Organization Meetings: Not on file    Marital Status: Not on file   Intimate Partner Violence:     Fear of Current or Ex-Partner: Not on file    Emotionally Abused: Not on file    Physically Abused: Not on file    Sexually Abused: Not on file   Housing Stability:     Unable to Pay for Housing in the Last Year: Not on file    Number of [place] in the Last Year: Not on file    Unstable Housing in the Last Year: Not on file       Family History   Adopted: Yes   Problem Relation Age of Onset    Asthma Mother     Crohn's Disease Mother     No Known Problems Father     Other Sister         Fibromyalgia    No Known Problems Brother     No Known Problems Maternal Grandmother     No Known Problems Maternal Grandfather     No Known Problems Paternal Grandmother     No Known Problems Paternal Grandfather     No Known Problems Daughter     Depression Daughter     No Known Problems Son        Allergies:  Pcn [penicillins]    Home Medications:  Prior to Admission medications    Medication Sig Start Date End Date Taking?  Authorizing Provider   nicotine (NICODERM CQ) 21 MG/24HR Place 1 patch onto the skin daily as needed (if patient is a smoker and requests nicotine replacemnt therapy) 7/22/20   Maco Revels   fluticasone [facility]) 50 MCG/ACT nasal spray 2 sprays by Nasal route daily  Patient not taking: Reported on 12/17/2019 8/29/19   EMANUEL Julian - CNP   ondansetron (ZOFRAN) 4 MG tablet Take 1 tablet by mouth every 8 hours as needed for Nausea or Vomiting  Patient not taking: Reported on 12/17/2019 6/16/19   Hue Marmolejo MD   folic acid (FOLVITE) 1 MG tablet Take 1 tablet by mouth daily  Patient not taking: Reported on 12/17/2019 6/17/19   Hue Marmolejo MD   midodrine (PROAMATINE) 5 MG tablet Take 1 tablet by mouth 3 times daily (with meals) Hold if systolic blood pressure is more than 110  Patient not taking: Reported on 12/17/2019 6/17/19   Ponce Mcnulty MD   vitamin B-1 100 MG tablet Take 1 tablet by mouth daily  Patient not taking: Reported on 12/17/2019 6/17/19   Ponce Mcnulty MD   buPROPion (WELLBUTRIN XL) 150 MG extended release tablet Take 150 mg by mouth every morning    Historical Provider, MD   Multiple Vitamin (MULTIVITAMIN) tablet Take 1 tablet by mouth daily  Patient not taking: Reported on 12/17/2019 5/30/19   Penny Hope DO   pantoprazole (PROTONIX) 40 MG tablet Take 1 tablet by mouth every morning (before breakfast)  Patient not taking: Reported on 12/17/2019 5/30/19   Penny Hope DO   traZODone (DESYREL) 50 MG tablet Take 1 tablet by mouth nightly as needed for Sleep 4/15/19   Bobby Markham MD   QUEtiapine (SEROQUEL) 300 MG tablet Take 1 tablet by mouth nightly 4/15/19   Bobby Markham MD       REVIEW OF SYSTEMS    (2-9 systems for level 4, 10 or more for level 5)      Review of Systems   Constitutional: Positive for fatigue. Negative for fever. HENT: Negative for ear pain, hearing loss and sore throat. Eyes: Negative for visual disturbance. Respiratory: Negative for shortness of breath. Cardiovascular: Negative for chest pain. Gastrointestinal: Positive for abdominal pain, nausea and vomiting. Negative for constipation and diarrhea. Genitourinary: Positive for decreased urine volume. Negative for difficulty urinating and dysuria. Musculoskeletal: Negative for arthralgias and myalgias. Neurological: Positive for dizziness, tremors and light-headedness. Negative for seizures, syncope, weakness and numbness. Psychiatric/Behavioral: Negative for agitation and confusion. The patient is nervous/anxious.         PHYSICAL EXAM   (up to 7 for level 4, 8 or more for level 5)      INITIAL VITALS:   /78   Pulse 89   Temp 97.9 °F (36.6 °C) (Oral)   Resp 16   Ht 5' 9\" (1.753 m)   Wt 166 lb (75.3 kg)   SpO2 98%   BMI 24.51 kg/m²     Physical Exam  Vitals and nursing note reviewed. Constitutional:       General: He is not in acute distress. Appearance: Normal appearance. He is well-developed. He is ill-appearing. He is not diaphoretic. Comments: Pale and tremulous, preferring to keep his arms crossed   HENT:      Head: Normocephalic and atraumatic. Right Ear: External ear normal.      Left Ear: External ear normal.      Nose: Nose normal.      Mouth/Throat:      Mouth: Mucous membranes are moist.   Eyes:      Extraocular Movements: Extraocular movements intact. Conjunctiva/sclera: Conjunctivae normal.   Neck:      Trachea: No tracheal deviation. Cardiovascular:      Rate and Rhythm: Regular rhythm. Tachycardia present. Heart sounds: Normal heart sounds. No murmur heard. No friction rub. No gallop. Pulmonary:      Effort: Pulmonary effort is normal. No respiratory distress. Breath sounds: Normal breath sounds. No wheezing, rhonchi or rales. Abdominal:      General: Abdomen is flat. There is no distension. Palpations: Abdomen is soft. There is no mass. Tenderness: There is abdominal tenderness (Epigastrium and suprapubic). There is no guarding or rebound. Musculoskeletal:         General: No swelling, deformity or signs of injury. Normal range of motion. Cervical back: Normal range of motion and neck supple. Right lower leg: No edema. Left lower leg: No edema. Comments: Patient demonstrates mild tenderness to palpation of the left lateral malleolus with no swelling, DP 2+   Skin:     General: Skin is warm and dry. Coloration: Skin is pale. Skin is not jaundiced. Findings: No bruising or lesion. Neurological:      General: No focal deficit present. Mental Status: He is alert and oriented to person, place, and time. Mental status is at baseline. Motor: No abnormal muscle tone. DIFFERENTIAL  DIAGNOSIS     PLAN (LABS / IMAGING / EKG):  Orders Placed This Encounter   Procedures    XR CHEST PORTABLE    XR ANKLE LEFT (MIN 3 VIEWS)    CBC with Auto Differential    Comprehensive Metabolic Panel    Magnesium    Phosphorus    Lipase    Troponin    Lactic Acid    Urinalysis with Reflex to Culture    CK    Protime-INR    APTT    PREVIOUS SPECIMEN    Urine Drug Screen    SPECIMEN REJECTION    Immature Platelet Fraction    TOX SCR, BLD, ED    Initiate Oxygen Therapy Protocol    EKG 12 Lead       MEDICATIONS ORDERED:  Orders Placed This Encounter   Medications    0.9 % sodium chloride bolus    LORazepam (ATIVAN) injection 1 mg    ondansetron (ZOFRAN) injection 4 mg    droperidol (INAPSINE) injection 1.25 mg    famotidine (PEPCID) injection 20 mg    0.9 % sodium chloride bolus    magnesium sulfate 2000 mg in 50 mL IVPB premix    LORazepam (ATIVAN) injection 1 mg       DDX: Alberta Dietz is a 47 y.o. male who presents to the emergency department with vomiting, alcohol abuse.  Differential diagnosis includes ankle injury, alcohol withdrawal, Michelle-Navarro tear, esophageal varices, dehydration, malnutrition    DIAGNOSTIC RESULTS / EMERGENCY DEPARTMENT COURSE / MDM   LAB RESULTS:  Results for orders placed or performed during the hospital encounter of 06/08/22   CBC with Auto Differential   Result Value Ref Range    WBC 8.8 3.5 - 11.3 k/uL    RBC 4.32 4.21 - 5.77 m/uL    Hemoglobin 14.9 13.0 - 17.0 g/dL    Hematocrit 43.6 40.7 - 50.3 %    .9 82.6 - 102.9 fL    MCH 34.5 (H) 25.2 - 33.5 pg    MCHC 34.2 28.4 - 34.8 g/dL    RDW 13.2 11.8 - 14.4 %    Platelets See Reflexed IPF Result 138 - 453 k/uL    NRBC Automated 0.0 0.0 per 100 WBC    Seg Neutrophils 83 (H) 36 - 65 %    Lymphocytes 9 (L) 24 - 43 %    Monocytes 8 3 - 12 %    Eosinophils % 1 1 - 4 %    Basophils 0 0 - 2 %    Immature Granulocytes 0 0 %    Segs Absolute 7.27 1.50 - 8.10 k/uL    Absolute Lymph # 0.77 <10 <10 mg/dL    Ethanol percent <6.062 <6.745 %    Salicylate Lvl <1 (L) 3 - 10 mg/dL    Toxic Tricyclic Sc,Blood NEGATIVE NEGATIVE       IMPRESSION: Brice Clement is a 47 y.o. male who presents to the emergency department with vomiting, alcohol abuse. On examination he is afebrile, vital signs demonstrate tachycardia and hypertension with respiratory rate 20 and examination demonstrates a pale, thin appearing male of stated age with normal heart and lung sounds and tenderness in the epigastrium and suprapubic with no peritoneal signs or rigidity. We will get labs and imaging and evaluate for cardiac status as well given the significant pain that patient experienced in his chest yesterday. Anticipate patient will be sent once again to [facility] but we will speak with social work about disposition provided patient is medically cleared. Patient verbalizes understanding and agreement with plan. RADIOLOGY:  No results found. EKG  EKG Interpretation    Interpreted by emergency department physician    Rhythm: Sinus tachycardia  Rate: 118  Axis: normal  Ectopy: none  Conduction: Computer does not record a prolonged QTC but QT does appear prolonged in 2, 3, aVF  ST Segments: no acute change  T Waves: no acute change  Q Waves: none    Clinical Impression: Sinus tachycardia with questionable prolonged QT, nonspecific EKG    Param Dang MD    All EKG's are interpreted by the Emergency Department Physician who either signs or co-signs this chart in the absence of a cardiologist.    EMERGENCY DEPARTMENT COURSE:  ED Course as of 06/08/22 1628   Wed Jun 08, 2022   1628 Patient excepted [facility], signed out to oncoming resident pending improvement in clinical condition, medical clearance for [facility] discharge. [TS]      ED Course User Index  [TS] Petra Aggarwal MD       PROCEDURES:  None    CONSULTS:  None    CRITICAL CARE:  None    FINAL IMPRESSION      1.  Alcohol withdrawal syndrome without complication ([facility] [address] 2. Non-intractable vomiting with nausea, unspecified vomiting type          DISPOSITION / PLAN     DISPOSITION        PATIENT REFERRED TO:  . None    Schedule an appointment as soon as possible for a visit in 1 week  For followup    [facility] ED  [address] [address]  [phone]  Go to   As needed, If symptoms worsen      DISCHARGE MEDICATIONS:  New Prescriptions    No medications on file       Dolores Rushing MD  Emergency Medicine Resident    This patient was evaluated in the Emergency Department for symptoms described in the history of present illness. He/she was evaluated in the context of the global COVID-19 pandemic, which necessitated consideration that the patient might be at risk for infection with the SARS-CoV-2 virus that causes COVID-19. Institutional protocols and algorithms that pertain to the evaluation of patients at risk for COVID-19 are in a state of rapid change based on information released by regulatory bodies including the CDC and federal and state organizations. These policies and algorithms were followed during the patient's care in the ED.     (Please note that portions of thisnote were completed with a voice recognition program.  Efforts were made to edit the dictations but occasionally words are mis-transcribed.)        Dolores Rushing MD  Resident  06/08/22 2407

## 2022-06-08 NOTE — Clinical Note
Krystyna Gomez was seen and treated in our emergency department on 6/8/2022. He may return to work on 06/10/2022. If you have any questions or concerns, please don't hesitate to call.       Rock Mckeon MD

## 2022-06-08 NOTE — ED NOTES
Patient placed in gown and on full cardiac monitor. Patient given emesis bag and warm blankets. Call light within reach, side rails up x2, patient asked to please stay in bed for risk of falls d/t DTs and medication that he is receiving.      Justen Wynn RN  06/08/22 [id number]

## 2022-06-08 NOTE — Clinical Note
Carmen Chery was seen and treated in our emergency department on 6/8/2022. He may return to work on 06/08/2022. If you have any questions or concerns, please don't hesitate to call.       Cruz Martinez, DO

## 2022-06-08 NOTE — ED PROVIDER NOTES
[address] ED     Emergency Department     Faculty Attestation        I performed a history and physical examination of the patient and discussed management with the resident. I reviewed the residents note and agree with the documented findings and plan of care. Any areas of disagreement are noted on the chart. I was personally present for the key portions of any procedures. I have documented in the chart those procedures where I was not present during the key portions. I have reviewed the emergency nurses triage note. I agree with the chief complaint, past medical history, past surgical history, allergies, medications, social and family history as documented unless otherwise noted below. For mid-level providers such as nurse practitioners as well as physicians assistants:    I have personally seen and evaluated the patient. I find the patient's history and physical exam are consistent with NP/PA documentation. I agree with the care provided, treatment rendered, disposition, & follow-up plan. Additional findings are as noted. Vital Signs: BP (!) 141/94   Pulse (!) 128   Temp 97.3 °F (36.3 °C) (Oral)   Resp 20   Ht 5' 9\" (1.753 m)   Wt 166 lb (75.3 kg)   SpO2 97%   BMI 24.51 kg/m²   PCP:  . None (Inactive)    Pertinent Comments:     Patient presents with concern for alcohol withdrawals he last drink about 12 to 24 hours ago and states he has been vomiting and having shaking and anxiety he states he has been vomiting for 3 days with small streaks of blood in it. No history of esophageal varices. He is hypertensive and tachycardic here and appears sick but is nontoxic.   Will obtain full laboratory work-up fluids, benzodiazepines, reassessment      Critical Care  None          Charles Parrish MD    Attending Emergency Medicine Physician              Akin Coronado MD  06/08/22 [id number]

## 2022-06-09 LAB
EKG ATRIAL RATE: 118 BPM
EKG P AXIS: 56 DEGREES
EKG P-R INTERVAL: 140 MS
EKG Q-T INTERVAL: 310 MS
EKG QRS DURATION: 94 MS
EKG QTC CALCULATION (BAZETT): 434 MS
EKG R AXIS: 61 DEGREES
EKG T AXIS: -16 DEGREES
EKG VENTRICULAR RATE: 118 BPM

## 2022-06-09 PROCEDURE — 93010 ELECTROCARDIOGRAM REPORT: CPT | Performed by: INTERNAL MEDICINE

## 2022-06-29 ENCOUNTER — HOSPITAL ENCOUNTER (OUTPATIENT)
Dept: GENERAL RADIOLOGY | Age: 52
Discharge: HOME | End: 2022-07-01
Payer: COMMERCIAL

## 2022-06-29 ENCOUNTER — HOSPITAL ENCOUNTER (OUTPATIENT)
Age: 52
Discharge: HOME | End: 2022-07-01
Payer: COMMERCIAL

## 2022-06-29 DIAGNOSIS — M25.572 ACUTE LEFT ANKLE PAIN: ICD-10-CM

## 2022-06-29 PROCEDURE — 73610 X-RAY EXAM OF ANKLE: CPT

## 2023-06-24 ENCOUNTER — HOSPITAL ENCOUNTER (EMERGENCY)
Age: 53
Discharge: ANOTHER HOSPITAL | End: 2023-06-24
Attending: EMERGENCY MEDICINE
Payer: COMMERCIAL

## 2023-06-24 VITALS
OXYGEN SATURATION: 98 % | DIASTOLIC BLOOD PRESSURE: 86 MMHG | HEIGHT: 70 IN | TEMPERATURE: 97.6 F | BODY MASS INDEX: 27.2 KG/M2 | RESPIRATION RATE: 15 BRPM | HEART RATE: 93 BPM | SYSTOLIC BLOOD PRESSURE: 131 MMHG | WEIGHT: 190 LBS

## 2023-06-24 DIAGNOSIS — F10.930 ALCOHOL WITHDRAWAL SYNDROME WITHOUT COMPLICATION (HCC): Primary | ICD-10-CM

## 2023-06-24 LAB
ALBUMIN SERPL-MCNC: 4.8 G/DL (ref 3.5–5.2)
ALBUMIN/GLOB SERPL: 1.7 {RATIO} (ref 1–2.5)
ALP SERPL-CCNC: 83 U/L (ref 40–129)
ALT SERPL-CCNC: 54 U/L (ref 5–41)
AMPHET UR QL SCN: NEGATIVE
ANION GAP SERPL CALCULATED.3IONS-SCNC: 18 MMOL/L (ref 9–17)
AST SERPL-CCNC: 73 U/L
BACTERIA URNS QL MICRO: ABNORMAL
BARBITURATES UR QL SCN: NEGATIVE
BASOPHILS # BLD: <0.03 K/UL (ref 0–0.2)
BASOPHILS NFR BLD: 0 % (ref 0–2)
BENZODIAZ UR QL: NEGATIVE
BILIRUB SERPL-MCNC: 0.7 MG/DL (ref 0.3–1.2)
BILIRUB UR QL STRIP: NEGATIVE
BUN SERPL-MCNC: 8 MG/DL (ref 6–20)
BUN/CREAT SERPL: 10 (ref 9–20)
CALCIUM SERPL-MCNC: 9.4 MG/DL (ref 8.6–10.4)
CANNABINOIDS UR QL SCN: NEGATIVE
CHARACTER UR: ABNORMAL
CHLORIDE SERPL-SCNC: 96 MMOL/L (ref 98–107)
CLARITY UR: CLEAR
CO2 SERPL-SCNC: 22 MMOL/L (ref 20–31)
COCAINE UR QL SCN: NEGATIVE
COLOR UR: YELLOW
CREAT SERPL-MCNC: 0.82 MG/DL (ref 0.7–1.2)
EKG ATRIAL RATE: 95 BPM
EKG P AXIS: 58 DEGREES
EKG P-R INTERVAL: 136 MS
EKG Q-T INTERVAL: 352 MS
EKG QRS DURATION: 96 MS
EKG QTC CALCULATION (BAZETT): 442 MS
EKG R AXIS: 57 DEGREES
EKG T AXIS: 46 DEGREES
EKG VENTRICULAR RATE: 95 BPM
EOSINOPHIL # BLD: 0.09 K/UL (ref 0–0.44)
EOSINOPHILS RELATIVE PERCENT: 2 % (ref 1–4)
EPI CELLS #/AREA URNS HPF: ABNORMAL /HPF (ref 0–5)
ERYTHROCYTE [DISTWIDTH] IN BLOOD BY AUTOMATED COUNT: 12.8 % (ref 11.8–14.4)
ETHANOLAMINE SERPL-MCNC: 82 MG/DL
FENTANYL UR QL: NEGATIVE
GFR SERPL CREATININE-BSD FRML MDRD: >60 ML/MIN/1.73M2
GLUCOSE SERPL-MCNC: 115 MG/DL (ref 70–99)
GLUCOSE UR STRIP-MCNC: NEGATIVE MG/DL
HCT VFR BLD AUTO: 42.9 % (ref 40.7–50.3)
HGB BLD-MCNC: 15 G/DL (ref 13–17)
HGB UR QL STRIP.AUTO: NEGATIVE
IMM GRANULOCYTES # BLD AUTO: <0.03 K/UL (ref 0–0.3)
IMM GRANULOCYTES NFR BLD: 0 %
KETONES UR STRIP-MCNC: ABNORMAL MG/DL
LEUKOCYTE ESTERASE UR QL STRIP: NEGATIVE
LIPASE SERPL-CCNC: 30 U/L (ref 13–60)
LYMPHOCYTES # BLD: 21 % (ref 24–43)
LYMPHOCYTES NFR BLD: 0.98 K/UL (ref 1.1–3.7)
MAGNESIUM SERPL-MCNC: 1.7 MG/DL (ref 1.6–2.6)
MCH RBC QN AUTO: 32.5 PG (ref 25.2–33.5)
MCHC RBC AUTO-ENTMCNC: 35 G/DL (ref 25.2–33.5)
MCV RBC AUTO: 92.9 FL (ref 82.6–102.9)
METHADONE UR QL: NEGATIVE
MONOCYTES NFR BLD: 0.72 K/UL (ref 0.1–1.2)
MONOCYTES NFR BLD: 15 % (ref 3–12)
MUCOUS THREADS URNS QL MICRO: ABNORMAL
NEUTROPHILS NFR BLD: 62 % (ref 36–65)
NEUTS SEG NFR BLD: 2.96 K/UL (ref 1.5–8.1)
NITRITE UR QL STRIP: NEGATIVE
NRBC BLD-RTO: 0 PER 100 WBC
OPIATES UR QL SCN: NEGATIVE
OXYCODONE UR QL SCN: NEGATIVE
PCP UR QL SCN: NEGATIVE
PH UR STRIP: 5.5 [PH] (ref 5–6)
PLATELET # BLD AUTO: 227 K/UL (ref 138–453)
PMV BLD AUTO: 9.9 FL (ref 8.1–13.5)
POTASSIUM SERPL-SCNC: 3.2 MMOL/L (ref 3.7–5.3)
PROT SERPL-MCNC: 7.6 G/DL (ref 6.4–8.3)
PROT UR STRIP-MCNC: ABNORMAL MG/DL
RBC # BLD AUTO: 4.62 M/UL (ref 4.21–5.77)
RBC #/AREA URNS HPF: ABNORMAL /HPF (ref 0–4)
SODIUM SERPL-SCNC: 136 MMOL/L (ref 135–144)
SP GR UR STRIP: 1.02 (ref 1.01–1.02)
TEST INFORMATION: NORMAL
TROPONIN I SERPL HS-MCNC: 10 NG/L (ref 0–22)
UROBILINOGEN UR STRIP-ACNC: NORMAL
WBC #/AREA URNS HPF: ABNORMAL /HPF (ref 0–4)
WBC OTHER # BLD: 4.8 K/UL (ref 3.5–11.3)

## 2023-06-24 PROCEDURE — 2580000003 HC RX 258: Performed by: EMERGENCY MEDICINE

## 2023-06-24 PROCEDURE — 6360000002 HC RX W HCPCS: Performed by: EMERGENCY MEDICINE

## 2023-06-24 PROCEDURE — 93005 ELECTROCARDIOGRAM TRACING: CPT | Performed by: EMERGENCY MEDICINE

## 2023-06-24 PROCEDURE — 96361 HYDRATE IV INFUSION ADD-ON: CPT

## 2023-06-24 PROCEDURE — 83690 ASSAY OF LIPASE: CPT

## 2023-06-24 PROCEDURE — 96374 THER/PROPH/DIAG INJ IV PUSH: CPT

## 2023-06-24 PROCEDURE — 99285 EMERGENCY DEPT VISIT HI MDM: CPT

## 2023-06-24 PROCEDURE — 83735 ASSAY OF MAGNESIUM: CPT

## 2023-06-24 PROCEDURE — 6370000000 HC RX 637 (ALT 250 FOR IP): Performed by: EMERGENCY MEDICINE

## 2023-06-24 PROCEDURE — 36415 COLL VENOUS BLD VENIPUNCTURE: CPT

## 2023-06-24 PROCEDURE — 80053 COMPREHEN METABOLIC PANEL: CPT

## 2023-06-24 PROCEDURE — 84484 ASSAY OF TROPONIN QUANT: CPT

## 2023-06-24 PROCEDURE — 6370000000 HC RX 637 (ALT 250 FOR IP)

## 2023-06-24 PROCEDURE — 96375 TX/PRO/DX INJ NEW DRUG ADDON: CPT

## 2023-06-24 PROCEDURE — G0480 DRUG TEST DEF 1-7 CLASSES: HCPCS

## 2023-06-24 PROCEDURE — 85027 COMPLETE CBC AUTOMATED: CPT

## 2023-06-24 PROCEDURE — 81001 URINALYSIS AUTO W/SCOPE: CPT

## 2023-06-24 PROCEDURE — 80307 DRUG TEST PRSMV CHEM ANLYZR: CPT

## 2023-06-24 PROCEDURE — 6360000002 HC RX W HCPCS

## 2023-06-24 RX ORDER — FOLIC ACID 1 MG/1
1 TABLET ORAL ONCE
Status: COMPLETED | OUTPATIENT
Start: 2023-06-24 | End: 2023-06-24

## 2023-06-24 RX ORDER — ONDANSETRON 2 MG/ML
4 INJECTION INTRAMUSCULAR; INTRAVENOUS ONCE
Status: COMPLETED | OUTPATIENT
Start: 2023-06-24 | End: 2023-06-24

## 2023-06-24 RX ORDER — POTASSIUM CHLORIDE 20 MEQ/1
TABLET, EXTENDED RELEASE ORAL
Status: COMPLETED
Start: 2023-06-24 | End: 2023-06-24

## 2023-06-24 RX ORDER — LANOLIN ALCOHOL/MO/W.PET/CERES
100 CREAM (GRAM) TOPICAL ONCE
Status: DISCONTINUED | OUTPATIENT
Start: 2023-06-24 | End: 2023-06-24

## 2023-06-24 RX ORDER — THIAMINE HYDROCHLORIDE 100 MG/ML
200 INJECTION, SOLUTION INTRAMUSCULAR; INTRAVENOUS ONCE
Status: COMPLETED | OUTPATIENT
Start: 2023-06-24 | End: 2023-06-24

## 2023-06-24 RX ORDER — 0.9 % SODIUM CHLORIDE 0.9 %
1000 INTRAVENOUS SOLUTION INTRAVENOUS ONCE
Status: COMPLETED | OUTPATIENT
Start: 2023-06-24 | End: 2023-06-24

## 2023-06-24 RX ORDER — POTASSIUM CHLORIDE 750 MG/1
40 TABLET, FILM COATED, EXTENDED RELEASE ORAL ONCE
Status: DISCONTINUED | OUTPATIENT
Start: 2023-06-24 | End: 2023-06-24 | Stop reason: HOSPADM

## 2023-06-24 RX ORDER — LORAZEPAM 2 MG/ML
1 INJECTION INTRAMUSCULAR ONCE
Status: COMPLETED | OUTPATIENT
Start: 2023-06-24 | End: 2023-06-24

## 2023-06-24 RX ORDER — LANOLIN ALCOHOL/MO/W.PET/CERES
1000 CREAM (GRAM) TOPICAL ONCE
Status: DISCONTINUED | OUTPATIENT
Start: 2023-06-24 | End: 2023-06-24

## 2023-06-24 RX ORDER — ONDANSETRON 2 MG/ML
INJECTION INTRAMUSCULAR; INTRAVENOUS
Status: COMPLETED
Start: 2023-06-24 | End: 2023-06-24

## 2023-06-24 RX ORDER — LANOLIN ALCOHOL/MO/W.PET/CERES
1000 CREAM (GRAM) TOPICAL ONCE
Status: COMPLETED | OUTPATIENT
Start: 2023-06-24 | End: 2023-06-24

## 2023-06-24 RX ORDER — LORAZEPAM 0.5 MG/1
1 TABLET ORAL ONCE
Status: COMPLETED | OUTPATIENT
Start: 2023-06-24 | End: 2023-06-24

## 2023-06-24 RX ADMIN — POTASSIUM CHLORIDE 20 MEQ: 1500 TABLET, EXTENDED RELEASE ORAL at 06:36

## 2023-06-24 RX ADMIN — LORAZEPAM 1 MG: 2 INJECTION INTRAMUSCULAR; INTRAVENOUS at 11:18

## 2023-06-24 RX ADMIN — LORAZEPAM 1 MG: 0.5 TABLET ORAL at 06:08

## 2023-06-24 RX ADMIN — SODIUM CHLORIDE 1000 ML: 9 INJECTION, SOLUTION INTRAVENOUS at 06:11

## 2023-06-24 RX ADMIN — CYANOCOBALAMIN TAB 1000 MCG 1000 MCG: 1000 TAB at 06:36

## 2023-06-24 RX ADMIN — ONDANSETRON 4 MG: 2 INJECTION INTRAMUSCULAR; INTRAVENOUS at 06:16

## 2023-06-24 RX ADMIN — THIAMINE HYDROCHLORIDE 200 MG: 100 INJECTION, SOLUTION INTRAMUSCULAR; INTRAVENOUS at 06:11

## 2023-06-24 RX ADMIN — FOLIC ACID 1 MG: 1 TABLET ORAL at 06:36

## 2023-06-24 ASSESSMENT — PAIN - FUNCTIONAL ASSESSMENT: PAIN_FUNCTIONAL_ASSESSMENT: NONE - DENIES PAIN

## 2023-06-24 ASSESSMENT — ENCOUNTER SYMPTOMS
COUGH: 0
NAUSEA: 0
VOMITING: 0
SHORTNESS OF BREATH: 0

## 2023-06-24 ASSESSMENT — LIFESTYLE VARIABLES
HOW MANY STANDARD DRINKS CONTAINING ALCOHOL DO YOU HAVE ON A TYPICAL DAY: 5 OR 6
HOW OFTEN DO YOU HAVE A DRINK CONTAINING ALCOHOL: 4 OR MORE TIMES A WEEK

## 2023-06-24 NOTE — ED PROVIDER NOTES
[facility] ED  [address]  [place] [address]  Phone: [phone]  eMERGENCY dEPARTMENT eNCOUnter      Pt Name: Abner Parra  MRN: [id number]  [place] 1970  Date of evaluation: 6/24/23      CHIEF COMPLAINT     Chief Complaint   Patient presents with    Alcohol Problem     Pt states last drink was around 0100 and is having withdrawal symptoms currently ,(nausea, anxiety) hx alcoholism for 30 years       HISTORY OF PRESENT ILLNESS    Abner Parra is a 46 y.o. male who presents today requesting assistance with detox. Patient indicates that he has a 30-year history of alcoholism. He typically drinks of vodka 1/5 or more per day. He had undergone detox program a few months ago in [facility]. He states that he was sober for approximately 4 to 6 weeks at that time but then subsequently did resume alcohol use. He states that he tried to stop drinking 2 days ago and got approximately 24 hours into the process of stopping cold turkey when he had really bad anxiety and symptoms so resumed drinking. He stopped drinking at 1:00 this morning and he is here for evaluation as he is starting to feel anxious. Patient is not currently having any chest pain pressure or shortness of breath. He does note that he had some chest discomfort a few days ago while attempting to detox on his own. No vomiting. Denies fevers chills cough cold or congestion. He denies suicidal or homicidal ideation or hallucinations. Denies recent fall or trauma. REVIEW OF SYSTEMS       Review of Systems   Respiratory:  Negative for cough and shortness of breath. Cardiovascular:  Negative for chest pain. Gastrointestinal:  Negative for nausea and vomiting. Musculoskeletal:  Negative for falls. Skin:  Negative for rash. Neurological:  Negative for headaches. Psychiatric/Behavioral:  Positive for substance abuse. Negative for hallucinations and suicidal ideas. The patient is nervous/anxious.     All other systems

## 2023-06-24 NOTE — ED PROVIDER NOTES
ADDENDUM:      Care of this patient was assumed from Natalie. The patient was seen for Alcohol Problem (Pt states last drink was around 0100 and is having withdrawal symptoms currently ,(nausea, anxiety) hx alcoholism for 30 years)  . The patient's initial evaluation and plan have been discussed with the prior provider who initially evaluated the patient. Nursing Notes, Past Medical Hx, Past Surgical Hx, Social Hx, Allergies, and Family Hx were all reviewed. Diagnostic Results     EKG: All EKG's are interpreted by the Emergency Department Physician who either signs or Co-signs this chart in the absence of a cardiologist.        RADIOLOGY:All plain film, CT, MRI, and formal ultrasound images (except ED bedside ultrasound) are read by the radiologist and the images and interpretations are reviewed by the emergency physician.      Studies:      No orders to display       LABS:   Labs Reviewed   CBC WITH AUTO DIFFERENTIAL - Abnormal; Notable for the following components:       Result Value    MCHC 35.0 (*)     Lymphocytes 21 (*)     Monocytes 15 (*)     Absolute Lymph # 0.98 (*)     All other components within normal limits   COMPREHENSIVE METABOLIC PANEL - Abnormal; Notable for the following components:    Glucose 115 (*)     Potassium 3.2 (*)     Chloride 96 (*)     Anion Gap 18 (*)     ALT 54 (*)     AST 73 (*)     All other components within normal limits   URINALYSIS WITH REFLEX TO CULTURE - Abnormal; Notable for the following components:    Ketones, Urine TRACE (*)     Protein, UA 1+ (*)     All other components within normal limits   ETHANOL - Abnormal; Notable for the following components:    Ethanol 82 (*)     All other components within normal limits   MICROSCOPIC URINALYSIS - Abnormal; Notable for the following components:    Mucus, UA 1+ (*)     Other Observations UA   (*)     Value: Utilizing a urinalysis as the only screening method to exclude a potential uropathogen can be unreliable in many

## 2023-06-26 LAB
EKG ATRIAL RATE: 95 BPM
EKG P AXIS: 58 DEGREES
EKG P-R INTERVAL: 136 MS
EKG Q-T INTERVAL: 352 MS
EKG QRS DURATION: 96 MS
EKG QTC CALCULATION (BAZETT): 442 MS
EKG R AXIS: 57 DEGREES
EKG T AXIS: 46 DEGREES
EKG VENTRICULAR RATE: 95 BPM

## 2023-09-06 ENCOUNTER — HOSPITAL ENCOUNTER (EMERGENCY)
Age: 53
Discharge: NURSING, REHAB OR LONG-TERM CARE | End: 2023-09-06
Attending: EMERGENCY MEDICINE
Payer: COMMERCIAL

## 2023-09-06 VITALS
TEMPERATURE: 98.4 F | HEIGHT: 69 IN | BODY MASS INDEX: 27.4 KG/M2 | OXYGEN SATURATION: 98 % | SYSTOLIC BLOOD PRESSURE: 133 MMHG | HEART RATE: 94 BPM | DIASTOLIC BLOOD PRESSURE: 96 MMHG | WEIGHT: 185 LBS | RESPIRATION RATE: 18 BRPM

## 2023-09-06 DIAGNOSIS — F10.930 ALCOHOL WITHDRAWAL SYNDROME WITHOUT COMPLICATION (HCC): Primary | ICD-10-CM

## 2023-09-06 LAB
ALBUMIN SERPL-MCNC: 4.5 G/DL (ref 3.5–5.2)
ALP SERPL-CCNC: 81 U/L (ref 40–129)
ALT SERPL-CCNC: 13 U/L (ref 5–41)
AMPHET UR QL SCN: NEGATIVE
ANION GAP SERPL CALCULATED.3IONS-SCNC: 19 MMOL/L (ref 9–17)
AST SERPL-CCNC: 18 U/L
BARBITURATES UR QL SCN: NEGATIVE
BASOPHILS # BLD: 0 K/UL (ref 0–0.2)
BASOPHILS NFR BLD: 1 % (ref 0–2)
BENZODIAZ UR QL: NEGATIVE
BILIRUB SERPL-MCNC: 0.4 MG/DL (ref 0.3–1.2)
BUN SERPL-MCNC: 13 MG/DL (ref 6–20)
CALCIUM SERPL-MCNC: 9.1 MG/DL (ref 8.6–10.4)
CANNABINOIDS UR QL SCN: NEGATIVE
CHLORIDE SERPL-SCNC: 102 MMOL/L (ref 98–107)
CO2 SERPL-SCNC: 22 MMOL/L (ref 20–31)
COCAINE UR QL SCN: NEGATIVE
CREAT SERPL-MCNC: 0.8 MG/DL (ref 0.7–1.2)
EOSINOPHIL # BLD: 0.1 K/UL (ref 0–0.4)
EOSINOPHILS RELATIVE PERCENT: 1 % (ref 0–4)
ERYTHROCYTE [DISTWIDTH] IN BLOOD BY AUTOMATED COUNT: 13.8 % (ref 11.5–14.9)
ETHANOL PERCENT: 0.19 %
ETHANOLAMINE SERPL-MCNC: 187 MG/DL
FENTANYL UR QL: NEGATIVE
GFR SERPL CREATININE-BSD FRML MDRD: >60 ML/MIN/1.73M2
GLUCOSE SERPL-MCNC: 92 MG/DL (ref 70–99)
HCT VFR BLD AUTO: 43.4 % (ref 41–53)
HGB BLD-MCNC: 14.4 G/DL (ref 13.5–17.5)
LYMPHOCYTES NFR BLD: 1.2 K/UL (ref 1–4.8)
LYMPHOCYTES RELATIVE PERCENT: 19 % (ref 24–44)
MAGNESIUM SERPL-MCNC: 2.2 MG/DL (ref 1.6–2.6)
MCH RBC QN AUTO: 31.6 PG (ref 26–34)
MCHC RBC AUTO-ENTMCNC: 33.1 G/DL (ref 31–37)
MCV RBC AUTO: 95.2 FL (ref 80–100)
METHADONE UR QL: NEGATIVE
MONOCYTES NFR BLD: 0.5 K/UL (ref 0.1–1.3)
MONOCYTES NFR BLD: 8 % (ref 1–7)
NEUTROPHILS NFR BLD: 71 % (ref 36–66)
NEUTS SEG NFR BLD: 4.6 K/UL (ref 1.3–9.1)
OPIATES UR QL SCN: NEGATIVE
OXYCODONE UR QL SCN: NEGATIVE
PCP UR QL SCN: NEGATIVE
PLATELET # BLD AUTO: 290 K/UL (ref 150–450)
PMV BLD AUTO: 8.1 FL (ref 6–12)
POTASSIUM SERPL-SCNC: 3.3 MMOL/L (ref 3.7–5.3)
PROT SERPL-MCNC: 7.3 G/DL (ref 6.4–8.3)
RBC # BLD AUTO: 4.55 M/UL (ref 4.5–5.9)
SODIUM SERPL-SCNC: 143 MMOL/L (ref 135–144)
TEST INFORMATION: NORMAL
WBC OTHER # BLD: 6.5 K/UL (ref 3.5–11)

## 2023-09-06 PROCEDURE — 6360000002 HC RX W HCPCS: Performed by: PHYSICIAN ASSISTANT

## 2023-09-06 PROCEDURE — 80053 COMPREHEN METABOLIC PANEL: CPT

## 2023-09-06 PROCEDURE — 80307 DRUG TEST PRSMV CHEM ANLYZR: CPT

## 2023-09-06 PROCEDURE — 83735 ASSAY OF MAGNESIUM: CPT

## 2023-09-06 PROCEDURE — 36415 COLL VENOUS BLD VENIPUNCTURE: CPT

## 2023-09-06 PROCEDURE — 85025 COMPLETE CBC W/AUTO DIFF WBC: CPT

## 2023-09-06 PROCEDURE — 6370000000 HC RX 637 (ALT 250 FOR IP): Performed by: PHYSICIAN ASSISTANT

## 2023-09-06 PROCEDURE — 99285 EMERGENCY DEPT VISIT HI MDM: CPT

## 2023-09-06 PROCEDURE — 96374 THER/PROPH/DIAG INJ IV PUSH: CPT

## 2023-09-06 PROCEDURE — G0480 DRUG TEST DEF 1-7 CLASSES: HCPCS

## 2023-09-06 RX ORDER — ONDANSETRON 2 MG/ML
4 INJECTION INTRAMUSCULAR; INTRAVENOUS ONCE
Status: COMPLETED | OUTPATIENT
Start: 2023-09-06 | End: 2023-09-06

## 2023-09-06 RX ORDER — LORAZEPAM 1 MG/1
1 TABLET ORAL ONCE
Status: COMPLETED | OUTPATIENT
Start: 2023-09-06 | End: 2023-09-06

## 2023-09-06 RX ORDER — POTASSIUM CHLORIDE 20 MEQ/1
40 TABLET, EXTENDED RELEASE ORAL ONCE
Status: COMPLETED | OUTPATIENT
Start: 2023-09-06 | End: 2023-09-06

## 2023-09-06 RX ADMIN — ONDANSETRON 4 MG: 2 INJECTION INTRAMUSCULAR; INTRAVENOUS at 17:06

## 2023-09-06 RX ADMIN — POTASSIUM CHLORIDE 40 MEQ: 1500 TABLET, EXTENDED RELEASE ORAL at 17:06

## 2023-09-06 RX ADMIN — LORAZEPAM 1 MG: 1 TABLET ORAL at 18:20

## 2023-09-06 ASSESSMENT — ENCOUNTER SYMPTOMS
NAUSEA: 0
SHORTNESS OF BREATH: 0
VOMITING: 0
ABDOMINAL PAIN: 0

## 2023-09-06 ASSESSMENT — PAIN - FUNCTIONAL ASSESSMENT: PAIN_FUNCTIONAL_ASSESSMENT: NONE - DENIES PAIN

## 2023-09-06 ASSESSMENT — VISUAL ACUITY: OU: 1

## 2023-09-06 NOTE — ED NOTES
Patient information faxed to [facility]. Writer spoke to intake staff for possible transfer.  to review case with admitting physician.

## 2023-09-06 NOTE — ED PROVIDER NOTES
[facility] ED  eMERGENCY dEPARTMENT eNCOUnter   Independent Attestation     Pt Name: Lissett Velásquez  MRN: [id number]  [address] [facility] 1970  Date of evaluation: 9/6/23       Lissett Velásquez is a 46 y.o. male who presents with Alcohol Problem        Based on the medical record, the care appears appropriate. I was personally available for consultation in the Emergency Department.     Janelle Wynn MD  Attending Emergency  Physician               Janelle Wynn MD  09/06/23 [id number]

## 2023-09-28 ENCOUNTER — HOSPITAL ENCOUNTER (EMERGENCY)
Age: 53
Discharge: NURSING, REHAB OR LONG-TERM CARE | End: 2023-09-28
Attending: STUDENT IN AN ORGANIZED HEALTH CARE EDUCATION/TRAINING PROGRAM
Payer: COMMERCIAL

## 2023-09-28 VITALS
BODY MASS INDEX: 28.17 KG/M2 | RESPIRATION RATE: 16 BRPM | TEMPERATURE: 97.2 F | OXYGEN SATURATION: 95 % | HEIGHT: 69 IN | WEIGHT: 190.2 LBS | SYSTOLIC BLOOD PRESSURE: 124 MMHG | DIASTOLIC BLOOD PRESSURE: 84 MMHG | HEART RATE: 87 BPM

## 2023-09-28 DIAGNOSIS — Z78.9 ALCOHOL USE: ICD-10-CM

## 2023-09-28 DIAGNOSIS — F10.10 ALCOHOL ABUSE: Primary | ICD-10-CM

## 2023-09-28 PROCEDURE — 99284 EMERGENCY DEPT VISIT MOD MDM: CPT

## 2023-09-28 PROCEDURE — 6370000000 HC RX 637 (ALT 250 FOR IP): Performed by: STUDENT IN AN ORGANIZED HEALTH CARE EDUCATION/TRAINING PROGRAM

## 2023-09-28 RX ORDER — LORATADINE 10 MG/1
10 TABLET ORAL DAILY
COMMUNITY

## 2023-09-28 RX ORDER — LORAZEPAM 0.5 MG/1
1 TABLET ORAL ONCE
Status: COMPLETED | OUTPATIENT
Start: 2023-09-28 | End: 2023-09-28

## 2023-09-28 RX ADMIN — LORAZEPAM 1 MG: 0.5 TABLET ORAL at 12:33

## 2023-09-28 ASSESSMENT — PATIENT HEALTH QUESTIONNAIRE - PHQ9
1. LITTLE INTEREST OR PLEASURE IN DOING THINGS: 3
2. FEELING DOWN, DEPRESSED OR HOPELESS: 3
SUM OF ALL RESPONSES TO PHQ QUESTIONS 1-9: 6
SUM OF ALL RESPONSES TO PHQ9 QUESTIONS 1 & 2: 6
SUM OF ALL RESPONSES TO PHQ QUESTIONS 1-9: 6

## 2023-09-28 ASSESSMENT — LIFESTYLE VARIABLES
HOW OFTEN DO YOU HAVE A DRINK CONTAINING ALCOHOL: 4 OR MORE TIMES A WEEK
HOW MANY STANDARD DRINKS CONTAINING ALCOHOL DO YOU HAVE ON A TYPICAL DAY: 10 OR MORE

## 2023-09-28 ASSESSMENT — PAIN - FUNCTIONAL ASSESSMENT
PAIN_FUNCTIONAL_ASSESSMENT: NONE - DENIES PAIN
PAIN_FUNCTIONAL_ASSESSMENT: NONE - DENIES PAIN

## 2023-09-28 NOTE — ED PROVIDER NOTES
[facility] DEFI[facility] ED  Emergency Department Encounter  [address] [facility] Emergency Services       Pt Yaa Pitch  MRN: [id number]  [address] [place] 1970  Date of evaluation: 9/28/23  PCP:  No primary care provider on file. CHIEF COMPLAINT       Chief Complaint   Patient presents with    Alcohol Problem     Been dealing with alcoholism for the past 30 years. Was at [facility] for detox about a month ago but never followed up. Wants to go for detox. Pt drinks approx 1/5 of vodka a day. HISTORY OF PRESENT ILLNESS     Azalia Hernandez is a 46 y.o. male who presents via personal vehicle with request for acute alcohol detox/rehab placement. Patient with no acute concerns at this time. Patient drinks a pint of vodka daily. Has reportedly been in rehab/detox multiple times. Last drink 10 minutes prior to coming into the emergency department. Full systemic review of systems negative. Patient has never had reported seizures with alcohol withdrawal.  Patient would like rehab/detox. No other acute complaints at this time. PAST MEDICAL / SURGICAL / SOCIAL / FAMILY HISTORY      has a past medical history of Acute superficial gastritis without hemorrhage, Alcohol abuse, Coffee ground emesis, Duodenitis, Esophagitis, GI bleed, Hematemesis, Hepatic steatosis, Hypertension, Intractable vomiting, Metabolic acidosis, increased anion gap, Severe major depression ([address]), Suicidal behavior, and Upper GI bleed. has a past surgical history that includes hernia repair; Upper gastrointestinal endoscopy (6-5-14); hernia repair (Right, 5/16/2019); Upper gastrointestinal endoscopy (N/A, 5/30/2019); Upper gastrointestinal endoscopy (N/A, 12/5/2019); and Upper gastrointestinal endoscopy (N/A, 8/14/2020).       Social History     Socioeconomic History    Marital status:      Spouse name: Not on file    Number of children: Not on file    Years of education: Not on file    Highest education level: Not on file

## 2023-09-28 NOTE — DISCHARGE INSTRUCTIONS
SUMMARY OF YOUR VISIT    Today you were seen for request for placement at alcohol detox/rehab facility. We have arranged a cab to take you to [facility] rehab center. Please continue to take your home medication as previously prescribed, I have made no changes to your home medications. You can return to our or another Emergency Department as needed or for worsening symptoms of chest pain, shortness of breath, high fevers not relieved by acetaminophen (Tylenol) and/or ibuprofen (Motrin / Advil), chills, feeling of your heart fluttering or racing, persistent nausea and/or vomiting, vomiting up blood, blood in your stool, loss of consciousness, numbness, weakness or tingling in the arms or legs or change in color of the extremities, changes in mental status, persistent headache, blurry vision, loss of bladder / bowel control, if you are unable to follow up with your physician, or other any other care or concern. Thank You! On behalf of the Emergency Department staff and team, I would like to thank you for allowing us the opportunity to participate in your health care and evaluation today.

## 2024-06-02 NOTE — H&P
stroke, heart disease,, Asthma, GERD, HLD, Cancer, Seizures,Thyroid disease, Kidney Disease, Hepatitis, TB, Psychiatric Disorders or Substance abuse. SURGICAL HISTORY       Past Surgical History:   Procedure Laterality Date    HERNIA REPAIR      left    UPPER GASTROINTESTINAL ENDOSCOPY  6-5-14    gastritis, esophagitis, ulcers-koko       FAMILY HISTORY     History reviewed. No pertinent family history. SOCIAL HISTORY       Social History     Social History    Marital status:      Spouse name: N/A    Number of children: N/A    Years of education: N/A     Social History Main Topics    Smoking status: Never Smoker    Smokeless tobacco: Never Used    Alcohol use 10.0 oz/week     20 Standard drinks or equivalent per week      Comment: 1/5 liquor daily when he can get it.  Drug use: No    Sexual activity: Not Asked     Other Topics Concern    None     Social History Narrative    None           REVIEW OF SYSTEMS      Allergies   Allergen Reactions    Pcn [Penicillins] Rash       No current facility-administered medications on file prior to encounter. Current Outpatient Prescriptions on File Prior to Encounter   Medication Sig Dispense Refill    ondansetron (ZOFRAN ODT) 4 MG disintegrating tablet Take 1 tablet by mouth every 8 hours as needed for Nausea 12 tablet 0    BuPROPion HCl (WELLBUTRIN PO) Take  by mouth.  HydrOXYzine Pamoate (VISTARIL PO) Take  by mouth.  QUEtiapine Fumarate (SEROQUEL PO) Take  by mouth.  famotidine (PEPCID) 40 MG tablet Take 1 tablet by mouth nightly. 60 tablet 0    omeprazole (PRILOSEC) 20 MG capsule Take 1 capsule by mouth every 12 hours. Not sure of dosage 60 capsule 0    sucralfate (CARAFATE) 1 GM tablet Take 1 tablet by mouth 4 times daily (with meals and nightly). 120 tablet 0    FLUoxetine (PROZAC) 10 MG capsule Take 40 mg by mouth daily.                         General health:  Patient states health is  Overall good, He insists he is not
" I went to urgent care with pain to my left lower back radiating to lower abdomen for about 1 month. pt denies any N.V.D

## (undated) DEVICE — STERILE POLYISOPRENE POWDER-FREE SURGICAL GLOVES WITH EMOLLIENT COATING: Brand: PROTEXIS

## (undated) DEVICE — TOTAL TRAY, 16FR 10ML SIL FOLEY, URN: Brand: MEDLINE

## (undated) DEVICE — DISPOSABLE LAPAROSCOPIC CORDS, 1 PER POUCH: Brand: A&E MEDICAL / DISPOSABLE LAPAROSCOPIC CORDS

## (undated) DEVICE — BLADELESS OBTURATOR: Brand: WECK VISTA

## (undated) DEVICE — CANNULA SEAL

## (undated) DEVICE — DEVICE TRCR 12X9X3IN WHT CLSR DISP OMNICLOSE

## (undated) DEVICE — CONNECTOR TBNG AUX H2O JET DISP FOR OLY 160/180 SER

## (undated) DEVICE — MARKER,SKIN,WI/RULER AND LABELS: Brand: MEDLINE

## (undated) DEVICE — AIRSEAL 8 MM ACCESS PORT AND LOW PROFILE OBTURATOR WITH BLADELESS OPTICAL TIP, 120 MM LENGTH: Brand: AIRSEAL

## (undated) DEVICE — SUTURE DEV SZ 2-0 WND CLSR ABSRB GS-22 VLOC COVIDIEN VLOCM2145

## (undated) DEVICE — MERCY DEFIANCE ENDO KIT: Brand: MEDLINE INDUSTRIES, INC.

## (undated) DEVICE — GOWN,AURORA,NONRNF,XL,30/CS: Brand: MEDLINE

## (undated) DEVICE — LINE SAMP O2 6.5FT W/FEMALE CONN F/ADULT CAPNOLINE PLUS

## (undated) DEVICE — PROTECTOR ULN NRV PUR FOAM HK LOOP STRP ANATOMICALLY

## (undated) DEVICE — BITE BLOCK W/VELCRO STRAP

## (undated) DEVICE — Device

## (undated) DEVICE — TIP COVER ACCESSORY

## (undated) DEVICE — NEEDLE SPNL 18GA L3.5IN W/ QNCKE SHARPER BVL DURA CLICK

## (undated) DEVICE — TROCAR: Brand: KII FIOS FIRST ENTRY

## (undated) DEVICE — SCISSOR SURG METZ CRV TIP

## (undated) DEVICE — GLOVE ORANGE PI 8   MSG9080

## (undated) DEVICE — NEEDLE INSUF L120MM DIA2MM DISP FOR PNEUMOPERI ENDOPATH

## (undated) DEVICE — CONTAINER,SPECIMEN,4OZ,OR STRL: Brand: MEDLINE

## (undated) DEVICE — DRAPE,REIN 53X77,STERILE: Brand: MEDLINE

## (undated) DEVICE — GLOVE SURG SZ 65 THK91MIL LTX FREE SYN POLYISOPRENE

## (undated) DEVICE — SOLUTION ANTIFOG VIS SYS CLEARIFY LAPSCP

## (undated) DEVICE — BLADE CLIPPER GEN PURP NS

## (undated) DEVICE — TOWEL,OR,DSP,ST,NATURAL,DLX,4/PK,20PK/CS: Brand: MEDLINE

## (undated) DEVICE — TRI-LUMEN FILTERED TUBE SET WITH ACTIVATED CHARCOAL FILTER: Brand: AIRSEAL

## (undated) DEVICE — CHLORAPREP 26ML ORANGE

## (undated) DEVICE — GLOVE SURG SZ 6 THK91MIL LTX FREE SYN POLYISOPRENE ANTI

## (undated) DEVICE — GOWN,SURGICAL,AURORA,SLEEVE: Brand: MEDLINE

## (undated) DEVICE — GOWN,AURORA,NONREINFORCED,LARGE: Brand: MEDLINE

## (undated) DEVICE — MITT PREP W575XL775IN POVIDONE IOD HAIR REMV

## (undated) DEVICE — 35 ML SYRINGE LUER-LOCK TIP: Brand: MONOJECT

## (undated) DEVICE — SINGLE-USE BIOPSY FORCEPS: Brand: RADIAL JAW 4

## (undated) DEVICE — 1200CC GUARDIAN II: Brand: GUARDIAN

## (undated) DEVICE — ARM DRAPE

## (undated) DEVICE — ADHESIVE SKIN CLSR 0.7ML TOP DERMBND ADV

## (undated) DEVICE — PATIENT RETURN ELECTRODE, SINGLE-USE, CONTACT QUALITY MONITORING, ADULT, WITH 9FT CORD, FOR PATIENTS WEIGING OVER 33LBS. (15KG): Brand: MEGADYNE

## (undated) DEVICE — COVER,MAYO STAND,STERILE: Brand: MEDLINE